# Patient Record
Sex: MALE | Race: WHITE | NOT HISPANIC OR LATINO | Employment: FULL TIME | URBAN - METROPOLITAN AREA
[De-identification: names, ages, dates, MRNs, and addresses within clinical notes are randomized per-mention and may not be internally consistent; named-entity substitution may affect disease eponyms.]

---

## 2020-12-14 ENCOUNTER — APPOINTMENT (EMERGENCY)
Dept: RADIOLOGY | Facility: HOSPITAL | Age: 40
End: 2020-12-14
Payer: OTHER MISCELLANEOUS

## 2020-12-14 ENCOUNTER — HOSPITAL ENCOUNTER (EMERGENCY)
Facility: HOSPITAL | Age: 40
Discharge: HOME | End: 2020-12-14
Attending: EMERGENCY MEDICINE
Payer: OTHER MISCELLANEOUS

## 2020-12-14 VITALS
RESPIRATION RATE: 18 BRPM | HEIGHT: 78 IN | SYSTOLIC BLOOD PRESSURE: 206 MMHG | OXYGEN SATURATION: 96 % | HEART RATE: 119 BPM | DIASTOLIC BLOOD PRESSURE: 108 MMHG | BODY MASS INDEX: 36.45 KG/M2 | WEIGHT: 315 LBS | TEMPERATURE: 97.2 F

## 2020-12-14 DIAGNOSIS — M25.512 ACUTE PAIN OF LEFT SHOULDER: Primary | ICD-10-CM

## 2020-12-14 PROCEDURE — 99283 EMERGENCY DEPT VISIT LOW MDM: CPT

## 2020-12-14 PROCEDURE — 73030 X-RAY EXAM OF SHOULDER: CPT

## 2020-12-14 PROCEDURE — 99282 EMERGENCY DEPT VISIT SF MDM: CPT | Performed by: EMERGENCY MEDICINE

## 2020-12-15 ENCOUNTER — TELEPHONE (OUTPATIENT)
Dept: OBGYN CLINIC | Facility: HOSPITAL | Age: 40
End: 2020-12-15

## 2020-12-23 ENCOUNTER — OFFICE VISIT (OUTPATIENT)
Dept: OBGYN CLINIC | Facility: CLINIC | Age: 40
End: 2020-12-23
Payer: OTHER MISCELLANEOUS

## 2020-12-23 VITALS
WEIGHT: 315 LBS | SYSTOLIC BLOOD PRESSURE: 160 MMHG | HEART RATE: 109 BPM | DIASTOLIC BLOOD PRESSURE: 109 MMHG | BODY MASS INDEX: 36.45 KG/M2 | HEIGHT: 78 IN

## 2020-12-23 DIAGNOSIS — S46.012A TRAUMATIC TEAR OF LEFT ROTATOR CUFF, UNSPECIFIED TEAR EXTENT, INITIAL ENCOUNTER: Primary | ICD-10-CM

## 2020-12-23 PROCEDURE — 20610 DRAIN/INJ JOINT/BURSA W/O US: CPT | Performed by: PHYSICIAN ASSISTANT

## 2020-12-23 PROCEDURE — 99203 OFFICE O/P NEW LOW 30 MIN: CPT | Performed by: PHYSICIAN ASSISTANT

## 2020-12-23 RX ORDER — AMLODIPINE BESYLATE 5 MG/1
TABLET ORAL
COMMUNITY
Start: 2020-11-28

## 2020-12-23 RX ORDER — BUPIVACAINE HYDROCHLORIDE 2.5 MG/ML
4 INJECTION, SOLUTION INFILTRATION; PERINEURAL
Status: COMPLETED | OUTPATIENT
Start: 2020-12-23 | End: 2020-12-23

## 2020-12-23 RX ORDER — METHYLPREDNISOLONE ACETATE 40 MG/ML
2 INJECTION, SUSPENSION INTRA-ARTICULAR; INTRALESIONAL; INTRAMUSCULAR; SOFT TISSUE
Status: COMPLETED | OUTPATIENT
Start: 2020-12-23 | End: 2020-12-23

## 2020-12-23 RX ORDER — LISINOPRIL 20 MG/1
20 TABLET ORAL DAILY
COMMUNITY
Start: 2020-12-04

## 2020-12-23 RX ADMIN — BUPIVACAINE HYDROCHLORIDE 4 ML: 2.5 INJECTION, SOLUTION INFILTRATION; PERINEURAL at 08:27

## 2020-12-23 RX ADMIN — METHYLPREDNISOLONE ACETATE 2 ML: 40 INJECTION, SUSPENSION INTRA-ARTICULAR; INTRALESIONAL; INTRAMUSCULAR; SOFT TISSUE at 08:27

## 2021-01-05 ENCOUNTER — OFFICE VISIT (OUTPATIENT)
Dept: OBGYN CLINIC | Facility: CLINIC | Age: 41
End: 2021-01-05
Payer: OTHER MISCELLANEOUS

## 2021-01-05 VITALS
WEIGHT: 315 LBS | DIASTOLIC BLOOD PRESSURE: 124 MMHG | HEIGHT: 78 IN | HEART RATE: 94 BPM | BODY MASS INDEX: 36.45 KG/M2 | SYSTOLIC BLOOD PRESSURE: 160 MMHG

## 2021-01-05 DIAGNOSIS — S46.012S ROTATOR CUFF STRAIN, LEFT, SEQUELA: ICD-10-CM

## 2021-01-05 DIAGNOSIS — M75.22 BICEPS TENDINITIS OF LEFT UPPER EXTREMITY: Primary | ICD-10-CM

## 2021-01-05 PROCEDURE — 20610 DRAIN/INJ JOINT/BURSA W/O US: CPT | Performed by: PHYSICIAN ASSISTANT

## 2021-01-05 PROCEDURE — 99213 OFFICE O/P EST LOW 20 MIN: CPT | Performed by: PHYSICIAN ASSISTANT

## 2021-01-05 RX ORDER — BUPIVACAINE HYDROCHLORIDE 2.5 MG/ML
4 INJECTION, SOLUTION INFILTRATION; PERINEURAL
Status: COMPLETED | OUTPATIENT
Start: 2021-01-05 | End: 2021-01-05

## 2021-01-05 RX ORDER — METHYLPREDNISOLONE ACETATE 40 MG/ML
2 INJECTION, SUSPENSION INTRA-ARTICULAR; INTRALESIONAL; INTRAMUSCULAR; SOFT TISSUE
Status: COMPLETED | OUTPATIENT
Start: 2021-01-05 | End: 2021-01-05

## 2021-01-05 RX ADMIN — BUPIVACAINE HYDROCHLORIDE 4 ML: 2.5 INJECTION, SOLUTION INFILTRATION; PERINEURAL at 09:59

## 2021-01-05 RX ADMIN — METHYLPREDNISOLONE ACETATE 2 ML: 40 INJECTION, SUSPENSION INTRA-ARTICULAR; INTRALESIONAL; INTRAMUSCULAR; SOFT TISSUE at 09:59
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
## 2021-01-05 NOTE — LETTER
January 5, 2021     Patient: Iliana Holbrook   YOB: 1980   Date of Visit: 1/5/2021       To Whom it May Concern:    Juliet Wood is under my professional care  He was seen in my office on 1/5/2021  He may return to work with limitations including desk or administrative duties only  If you have any questions or concerns, please don't hesitate to call  Sincerely,          Joie Higueraly, DO        CC: Bayron Berumen

## 2021-01-11 ENCOUNTER — EVALUATION (OUTPATIENT)
Dept: PHYSICAL THERAPY | Facility: CLINIC | Age: 41
End: 2021-01-11
Payer: OTHER MISCELLANEOUS

## 2021-01-11 DIAGNOSIS — M75.22 BICEPS TENDINITIS OF LEFT UPPER EXTREMITY: ICD-10-CM

## 2021-01-11 DIAGNOSIS — S46.012S ROTATOR CUFF STRAIN, LEFT, SEQUELA: ICD-10-CM

## 2021-01-11 PROCEDURE — 97162 PT EVAL MOD COMPLEX 30 MIN: CPT | Performed by: PHYSICAL THERAPIST

## 2021-01-11 PROCEDURE — 97110 THERAPEUTIC EXERCISES: CPT | Performed by: PHYSICAL THERAPIST
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
## 2021-01-13 ENCOUNTER — TELEPHONE (OUTPATIENT)
Dept: OBGYN CLINIC | Facility: HOSPITAL | Age: 41
End: 2021-01-13

## 2021-01-13 ENCOUNTER — APPOINTMENT (OUTPATIENT)
Dept: PHYSICAL THERAPY | Facility: CLINIC | Age: 41
End: 2021-01-13
Payer: OTHER MISCELLANEOUS

## 2021-01-14 DIAGNOSIS — Z20.822 CLOSE EXPOSURE TO COVID-19 VIRUS: ICD-10-CM

## 2021-01-14 DIAGNOSIS — R50.9 FEVER, UNSPECIFIED FEVER CAUSE: ICD-10-CM

## 2021-01-14 DIAGNOSIS — R05.9 COUGH: ICD-10-CM

## 2021-01-14 PROCEDURE — U0003 INFECTIOUS AGENT DETECTION BY NUCLEIC ACID (DNA OR RNA); SEVERE ACUTE RESPIRATORY SYNDROME CORONAVIRUS 2 (SARS-COV-2) (CORONAVIRUS DISEASE [COVID-19]), AMPLIFIED PROBE TECHNIQUE, MAKING USE OF HIGH THROUGHPUT TECHNOLOGIES AS DESCRIBED BY CMS-2020-01-R: HCPCS | Performed by: INTERNAL MEDICINE

## 2021-01-14 PROCEDURE — U0005 INFEC AGEN DETEC AMPLI PROBE: HCPCS | Performed by: INTERNAL MEDICINE

## 2021-01-16 LAB — SARS-COV-2 RNA SPEC QL NAA+PROBE: DETECTED

## 2021-01-18 ENCOUNTER — APPOINTMENT (OUTPATIENT)
Dept: PHYSICAL THERAPY | Facility: CLINIC | Age: 41
End: 2021-01-18
Payer: OTHER MISCELLANEOUS

## 2021-01-20 ENCOUNTER — APPOINTMENT (OUTPATIENT)
Dept: PHYSICAL THERAPY | Facility: CLINIC | Age: 41
End: 2021-01-20
Payer: OTHER MISCELLANEOUS

## 2021-01-25 ENCOUNTER — APPOINTMENT (OUTPATIENT)
Dept: PHYSICAL THERAPY | Facility: CLINIC | Age: 41
End: 2021-01-25
Payer: OTHER MISCELLANEOUS

## 2021-01-27 ENCOUNTER — OFFICE VISIT (OUTPATIENT)
Dept: PHYSICAL THERAPY | Facility: CLINIC | Age: 41
End: 2021-01-27
Payer: OTHER MISCELLANEOUS

## 2021-01-27 DIAGNOSIS — S46.012S ROTATOR CUFF STRAIN, LEFT, SEQUELA: ICD-10-CM

## 2021-01-27 DIAGNOSIS — M75.22 BICEPS TENDINITIS OF LEFT UPPER EXTREMITY: Primary | ICD-10-CM

## 2021-01-27 PROCEDURE — 97110 THERAPEUTIC EXERCISES: CPT

## 2021-01-27 PROCEDURE — 97140 MANUAL THERAPY 1/> REGIONS: CPT

## 2021-01-27 NOTE — PROGRESS NOTES
Daily Note     Today's date: 2021  Patient name: Hugh Givens  : 1980  MRN: [id number]  Referring provider: Claudette Epperson  Dx:   Encounter Diagnosis     ICD-10-CM    1  Biceps tendinitis of left upper extremity  M75 22    2  Rotator cuff strain, left, sequela  S46 012S        Start Time: 1000  Stop Time: 1105  Total time in clinic (min): 65 minutes    Subjective: "I am the same, the pain is same, I can't move my arm without pain "      Objective: See treatment diary below      Assessment: Tolerated treatment fair  Pt apprehensive with all A/PROM, muscle guarding throughout end range stretching  Reviewed posture and stretching for HEP  Decreased motion and strength in all planes  Will continue to progress as able  Patient demonstrated fatigue post treatment and would benefit from continued PT      Plan: Continue per plan of care  Progress treatment as tolerated         Precautions: high pain irritability     Re-eval Date: 2021    Date       Visit Count 1 2      FOTO See IE       Pain In See IE Rest= burning      Pain Out See IE        HEP: ER stretch, trap stretch, SCM stretch, pec stretch, scap squeeze, posture correction    Manuals        PROM, MFR, prn   L shoulder end range stretching, ant/post  Oscillation with glides  15'                              Neuro Re-Ed                                                                Ther Ex        UBE  Pulleys  Flex/scap  2' ea      Trap, SCM stretches 2x30" ea        pec door stretch 2x30"  4x30"      Scap Retract 20x 5"  20x/5"      Posture correction  20x 10"        Sleeper stretch         Prone Rows        ER/IR TB w/ lateral steps  Red  2x10/5"      Thoracic rot/ext        Elbow flex x3        AAROM w/ cane  Flex 2x10/5"       I,Y, Ts        Ther Activity                        Gait Training                        Modalities        CP 10' L shld  10' L shld skin intact pre/post tx      Prn

## 2021-02-01 ENCOUNTER — APPOINTMENT (OUTPATIENT)
Dept: PHYSICAL THERAPY | Facility: CLINIC | Age: 41
End: 2021-02-01
Payer: OTHER MISCELLANEOUS

## 2021-02-03 ENCOUNTER — APPOINTMENT (OUTPATIENT)
Dept: PHYSICAL THERAPY | Facility: CLINIC | Age: 41
End: 2021-02-03
Payer: OTHER MISCELLANEOUS

## 2021-02-05 ENCOUNTER — OFFICE VISIT (OUTPATIENT)
Dept: PHYSICAL THERAPY | Facility: CLINIC | Age: 41
End: 2021-02-05
Payer: OTHER MISCELLANEOUS

## 2021-02-05 DIAGNOSIS — M75.22 BICEPS TENDINITIS OF LEFT UPPER EXTREMITY: Primary | ICD-10-CM

## 2021-02-05 DIAGNOSIS — S46.012S ROTATOR CUFF STRAIN, LEFT, SEQUELA: ICD-10-CM

## 2021-02-05 PROCEDURE — 97110 THERAPEUTIC EXERCISES: CPT

## 2021-02-05 PROCEDURE — 97112 NEUROMUSCULAR REEDUCATION: CPT

## 2021-02-05 PROCEDURE — 97140 MANUAL THERAPY 1/> REGIONS: CPT

## 2021-02-05 NOTE — PROGRESS NOTES
Daily Note     Today's date: 2021  Patient name: Miguel Ángel Keys  : 1980  MRN: [id number]  Referring provider: Kate Bernardo  Dx:   Encounter Diagnosis     ICD-10-CM    1  Biceps tendinitis of left upper extremity  M75 22    2  Rotator cuff strain, left, sequela  S46 012S        Start Time: 0900  Stop Time: 1010  Total time in clinic (min): 70 minutes    Subjective: "I had a lot pain when I woke up this morning  I think I have more ROM but the pain is still there  I go back to the Dr 21 "      Objective: See treatment diary below      Assessment: Tolerated treatment well  Pt able to complete progression of exercises despite pain levels  Increased PROM with distraction; "catching" noted with returning to neutral position  Able to tolerate RTC strengthening without increase in symptoms  Pt RTD 21 and will schedule more appt after  Patient demonstrated fatigue post treatment and would benefit from continued PT      Plan: Continue per plan of care  Progress treatment as tolerated         Precautions: high pain irritability     Re-eval Date: 2021    Date      Visit Count 1 2 3     FOTO See IE       Pain In See IE Rest= burning 6-7     Pain Out See IE        HEP: ER stretch, trap stretch, SCM stretch, pec stretch, scap squeeze, posture correction    Manuals        PROM, MFR, prn   L shoulder end range stretching, ant/post  Oscillation with glides  15' L shoulder end range stretching with distraction, ant/post  Oscillation with glides  15'                             Neuro Re-Ed                                                                Ther Ex        UBE  Pulleys  Flex/scap  2' ea UBE  120 RPM 10'alt      Pulleys  Flex/scap  2' ea     Trap, SCM stretches 2x30" ea        pec door stretch 2x30"  4x30" 4x30"     Scap Retract 20x 5"  20x/5" MTP/LTP  grn 2x10/3-5"     Posture correction  20x 10"        Sleeper stretch         Prone Rows   2x10/3-5"     ER/IR TB w/ lateral steps  Red  2x10/5" Red  2x10/5"     Thoracic rot/ext   SL ER   2x10/3-5"     Elbow flex x3        AAROM w/ cane  Flex 2x10/5"  Flex 2x10/5"      I,Y, Ts   Single arm  2x10/3-5" ea     Ther Activity                        Gait Training                        Modalities        CP 10' L shld  10' L shld skin intact pre/post tx      Prn

## 2021-02-09 ENCOUNTER — OFFICE VISIT (OUTPATIENT)
Dept: OBGYN CLINIC | Facility: CLINIC | Age: 41
End: 2021-02-09
Payer: OTHER MISCELLANEOUS

## 2021-02-09 VITALS
HEIGHT: 78 IN | HEART RATE: 105 BPM | BODY MASS INDEX: 36.45 KG/M2 | WEIGHT: 315 LBS | DIASTOLIC BLOOD PRESSURE: 104 MMHG | SYSTOLIC BLOOD PRESSURE: 145 MMHG

## 2021-02-09 DIAGNOSIS — S43.432A TEAR OF LEFT GLENOID LABRUM, INITIAL ENCOUNTER: Primary | ICD-10-CM

## 2021-02-09 PROCEDURE — 99213 OFFICE O/P EST LOW 20 MIN: CPT | Performed by: ORTHOPAEDIC SURGERY

## 2021-02-09 NOTE — PROGRESS NOTES
ASSESSMENT/PLAN:    Diagnoses and all orders for this visit:    Tear of left glenoid labrum, initial encounter  -     MRI arthrogram left shoulder; Future  -     FL arthrogram shoulder left; Future          The patient is continuing to complain of both left shoulder pain and reduced range of motion  He has been participating in physical therapy and has not seen vast improvement  The MRI of his left shoulder was essentially negative  At this point, will order an MRI arthrogram of his left shoulder to rule out a tear of his labrum  He will follow up with our office once the MRI arthrogram is complete  The patient is acceptable to this plan  Return for  MRI arthrogram results  _____________________________________________________  CHIEF COMPLAINT:  Chief Complaint   Patient presents with    Left Shoulder - Follow-up         SUBJECTIVE:  Pauline Ceja is a 36 y o  male   With a history of a left shoulder injury  Last visit, the patient's biceps tendon sheath was injected with Depo-Medrol and Marcaine  He has also been participating in physical therapy  Today, he states that his range of motion has somewhat improved, however, it still limited  He is still complaining of left shoulder pain  He denies any new trauma or falls  He denies any numbness or tingling  He denies any fever or chills  The following portions of the patient's history were reviewed and updated as appropriate: allergies, current medications, past family history, past medical history, past social history, past surgical history and problem list     PAST MEDICAL HISTORY:  Past Medical History:   Diagnosis Date    Hypertension        PAST SURGICAL HISTORY:  History reviewed  No pertinent surgical history  FAMILY HISTORY:  History reviewed  No pertinent family history      SOCIAL HISTORY:  Social History     Tobacco Use    Smoking status: Never Smoker    Smokeless tobacco: Current User     Types: Chew   Substance Use Topics    Alcohol use: Never     Frequency: Never    Drug use: Never       MEDICATIONS:    Current Outpatient Medications:     amLODIPine (NORVASC) 5 mg tablet, take 1 tablet by mouth once daily AT BREAKFAST, Disp: , Rfl:     lisinopril (ZESTRIL) 20 mg tablet, Take 20 mg by mouth daily, Disp: , Rfl:     ALLERGIES:  No Known Allergies    ROS:  Review of Systems     Constitutional: Negative for fatigue, fever or loss of appetite  HENT: Negative  Respiratory: Negative for shortness of breath, dyspnea  Cardiovascular: Negative for chest pain/tightness  Gastrointestinal: Negative for abdominal pain, N/V  Endocrine: Negative for cold/heat intolerance, unexplained weight loss/gain  Genitourinary: Negative for flank pain, dysuria, hematuria  Musculoskeletal: Positive for arthralgia   Skin: Negative for rash  Neurological: Negative for numbness or tingling  Psychiatric/Behavioral: Negative for agitation  _____________________________________________________  PHYSICAL EXAMINATION:    Blood pressure (!) 145/104, pulse 105, height 6' 6" (1 981 m), weight (!) 143 kg (315 lb)  Constitutional: Oriented to person, place, and time  Appears well-developed and well-nourished  No distress  HENT:   Head: Normocephalic  Eyes: Conjunctivae are normal  Right eye exhibits no discharge  Left eye exhibits no discharge  No scleral icterus  Cardiovascular: Normal rate  Pulmonary/Chest: Effort normal    Neurological: Alert and oriented to person, place, and time  Skin: Skin is warm and dry  No rash noted  Not diaphoretic  No erythema  No pallor  Psychiatric: Normal mood and affect   Behavior is normal  Judgment and thought content normal       MUSCULOSKELETAL EXAMINATION:   Physical Exam  Ortho Exam      Left upper extremity is neurovascularly intact  Fingers are pink and mobile   Compartments are soft   Range of motion of shoulders from 0-90 degrees forward flexion abduction   Rotator cuff strength 4/5   Mild signs of impingement   Brisk cap refill   Sensation intact  Objective:  BP Readings from Last 1 Encounters:   02/09/21 (!) 145/104      Wt Readings from Last 1 Encounters:   02/09/21 (!) 143 kg (315 lb)        BMI:   Estimated body mass index is 36 4 kg/m² as calculated from the following:    Height as of this encounter: 6' 6" (1 981 m)  Weight as of this encounter: 143 kg (315 lb)            Carlos Gupta PA-C

## 2021-02-10 ENCOUNTER — TRANSCRIBE ORDERS (OUTPATIENT)
Dept: ADMINISTRATIVE | Facility: HOSPITAL | Age: 41
End: 2021-02-10

## 2021-02-10 DIAGNOSIS — S43.432A SUPERIOR GLENOID LABRUM LESION OF LEFT SHOULDER, INITIAL ENCOUNTER: Primary | ICD-10-CM

## 2021-02-16 ENCOUNTER — TELEPHONE (OUTPATIENT)
Dept: OBGYN CLINIC | Facility: CLINIC | Age: 41
End: 2021-02-16

## 2021-02-16 NOTE — TELEPHONE ENCOUNTER
Patient sees Dr Nikki Gil    Patient just caled back stating he has an MRI scheduled through [facility] on 2/17    Call back # [phone]

## 2021-02-16 NOTE — TELEPHONE ENCOUNTER
Called and LVM for Fabby Starkey and David Gallegos asking for Fabby Starkey to call [facility] at [phone] to schedule an arthrogram mri for his shoulder  We received a fax saying they have not been able to reach him

## 2021-02-17 ENCOUNTER — OFFICE VISIT (OUTPATIENT)
Dept: PHYSICAL THERAPY | Facility: CLINIC | Age: 41
End: 2021-02-17
Payer: OTHER MISCELLANEOUS

## 2021-02-17 DIAGNOSIS — M75.22 BICEPS TENDINITIS OF LEFT UPPER EXTREMITY: Primary | ICD-10-CM

## 2021-02-17 DIAGNOSIS — S46.012S ROTATOR CUFF STRAIN, LEFT, SEQUELA: ICD-10-CM

## 2021-02-17 PROCEDURE — 97140 MANUAL THERAPY 1/> REGIONS: CPT | Performed by: PHYSICAL MEDICINE & REHABILITATION

## 2021-02-17 PROCEDURE — 97110 THERAPEUTIC EXERCISES: CPT | Performed by: PHYSICAL MEDICINE & REHABILITATION

## 2021-02-17 NOTE — PROGRESS NOTES
PT Re-Evaluation     Today's date: 2021  Patient name: Deng Loza  : 1980  MRN: [id number]  Referring provider: Madie Loredo  Dx:   Encounter Diagnosis     ICD-10-CM    1  Biceps tendinitis of left upper extremity  M75 22    2  Rotator cuff strain, left, sequela  S46 012S                   Assessment  Assessment details: Deng Loza is a 36 y o  male presenting to outpatient physical therapy with diagnosis of biceps tendonitis and L shoulder pain  He has limited attendance with PT to date 2* COVID; attended 3 tx sessions prior to today  Thus, overall progress has been limited  Also continues with significant pain and limited A/PROM L shoulder with limited improvement since [facility]; thus progressions with PT have been poorly tolerated  Despite this, pt does report improvements in A/PROM since [facility] and notes his pain has not been as constant  He demonstrates mild improvements in L shoulder A/PROM  Overall he continues with high sx irritability with pain L GHJ  A/PROM L shoulder remains poor with empty end feel with pain  Cont'd to c/o "catching" in the joint with AROM L shoulder  Strength remains limited all planes L shoulder 2* pain  Cont'd functional limitations persist including limited tolerance for ADLs such as bathing/dressing, inability to lift with L UE, and inability to RTW duties  Patient to benefit from cont'd skilled outpatient physical therapy 2x/week for 4 weeks in order to reduce pain, maximize pain free range of motion, increase strength and stability, and improve functional activity in order to maximize return to prior level of function with reduced limitations   Will await results of upcoming MRI arthrogram  Thank you for your referral     Impairments: abnormal or restricted ROM, activity intolerance, impaired physical strength, lacks appropriate home exercise program, pain with function, poor posture  and poor body mechanics    Symptom irritability: highUnderstanding of Dx/Px/POC: good   Prognosis: fair    Goals  STGs to be achieved in 4 weeks:  1  Pt to demonstrate reduced subjective pain rating "at worst" by at least 2-3 points from Initial Eval in order to allow for reduced pain with ADLs and improved functional activity tolerance  - not met   2  Pt to demonstrate increased AROM of L shld by at least 5-10 degrees in order to allow for greater ease and independence with ADLs and functional mobility  - progressing   3  Pt to demonstrate full PROM of L Shld in order to maximize joint mobility and function and allow for progression of exercise program and achievement of goals  - not met   4  Pt to demonstrate increased MMT of L shld by at least 1/2-1 grade in order to improve safety and stability with ADLs and functional mobility  - not met     LTGs to be achieved in 6-8 weeks:  1  Pt will be I with HEP in order to continue to improve quality of life and independence and reduce risk for re-injury  - progressing   2  Pt to demonstrate return to work without limitations or restrictions  - not met   3  Pt to demonstrate improved function as noted by achieving or exceeding predicted score on FOTO outcomes assessment tool  - not met       Plan  Patient would benefit from: skilled physical therapy  Planned modality interventions: TENS  Other planned modality interventions: prn for symptom management   Planned therapy interventions: manual therapy, neuromuscular re-education, therapeutic activities, therapeutic exercise, home exercise program, patient education, self care, strengthening, stretching, joint mobilization and motor coordination training  Frequency: 2x week  Duration in visits: 8  Duration in weeks: 4  Plan of Care beginning date: 2/17/2021  Plan of Care expiration date: 3/19/2021  Treatment plan discussed with: patient and PTA        Subjective Evaluation    History of Present Illness  Mechanism of injury: Pt notes overall some improvements since [facility]   Notes improved A/PROM of his shoulder in PT/with HEP  Notes pain is no longer as constant/persistent, however notes cont'd sharp pains anterior L GHJ "inside the joint" when he reaches/moves it at times  Notes he often "forgets about it" and goes to reach with sudden pain in L GHJ  Notes cont'd "catching" sensation with ROM L shoulder  Notes cont'd stiffness in his shoulder, especially in the mornings  Denies any new sx/complaints  F/u with MD; referred for MRI arthrogram; to be done next Wed  No additional tx to date  RTD after MRI  Persistent functional limitations 2* cont'd L shoulder sx; difficulty bathing, dressing, reaching into cabinets, and lifting/shoveling  Is back to work "light duty"  Notes the last few days have been "pretty good", but overall continues to have good days/bad days  Pain  Current pain ratin  At best pain ratin (at rest )  At worst pain ratin  Location: L shoulder anteiror, axilla   Quality: sharp, throbbing, dull ache and tight  Relieving factors: ice, medications and rest  Aggravating factors: overhead activity, walking and lifting  Progression: improved (slightly )      Diagnostic Tests  MRI studies: normal (upcoming arthrogram )  Treatments  Previous treatment: injection treatment and medication  Current treatment: injection treatment and physical therapy  Patient Goals  Patient goals for therapy: decreased pain, increased motion, increased strength, independence with ADLs/IADLs, return to work and return to sport/leisure activities          Objective     Postural Observations  Seated posture: fair  Standing posture: fair    Additional Postural Observation Details  Forward head, tight SCM, Traps, pectoralis major/minor     Palpation   Left   Tenderness of the biceps, pectoralis major and pectoralis minor       Additional Palpation Details  Continues with ttp at anterior L shoulder; not as severe as at IE per pt       Tenderness     Left Shoulder   Tenderness in the biceps tendon (proximal), bicipital groove, coracoid process and subacromial bursa  No tenderness in the [facility] joint and SC joint  Cervical/Thoracic Screen   Cervical range of motion within normal limits    Neurological Testing     Sensation     Shoulder   Left Shoulder   Intact: light touch    Right Shoulder   Intact: light touch    Reflexes   Left   Biceps (C5/C6): trace (1+)  Brachioradialis (C6): trace (1+)  Triceps (C7): trace (1+)    Right   Deltoid (C5): trace (1+)  Biceps (C5/C6): trace (1+)  Brachioradialis (C6): trace (1+)  Triceps (C7): trace (1+)    Active Range of Motion   Left Shoulder   Flexion: 95 degrees with pain  Abduction: 85 degrees with pain  External rotation 90°: 45 degrees with pain  Internal rotation 90°: 40 degrees with pain    Right Shoulder   Flexion: 140 degrees   Abduction: 140 degrees   External rotation 90°: WFL  Internal rotation 90°: WFL    Additional Active Range of Motion Details  Pain and apprehension continues with L shoulder AROM  Notes a "ratcheting/catching" with lowering L GHJ from elevated position       Passive Range of Motion   Left Shoulder   Flexion: 110 degrees with pain  Abduction: 90 (scaption 120 ) degrees with pain  External rotation 90°: 50 degrees with pain  Internal rotation 90°: 45 degrees with pain    Additional Passive Range of Motion Details  Empty end feel with pain with all PROM L GHJ     Joint Play   Left Shoulder  Joints within functional limits are the anterior capsule, posterior capsule and inferior capsule  Hypomobile in the [facility] joint, cervical spine and thoracic spine      Strength/Myotome Testing     Left Shoulder     Planes of Motion   Flexion: 3+   Extension: 3+   Abduction: 3+   External rotation at 0°: 3+   Internal rotation at 0°: 3+     Isolated Muscles   Biceps: 4+   Triceps: 4     Right Shoulder     Planes of Motion   Flexion: 5   Extension: 5   Abduction: 5   External rotation at 90°: 5   Internal rotation at 90°: 5     Additional Strength Details  Pain and poor resistance tolerated with L shoulder break testing/MMT screen   Limited strength/effort noted with MMT all planes L shoulder with c/o pain       Tests     Left Shoulder   Positive empty can, external rotation lag sign, Hawkin's, lift-off, Neer's, painful arc and bicep load   Negative apprehension, belly press, drop arm, jerk, sulcus sign and relocation                Precautions: high pain irritability     Re-eval Date: 2/11/2021    Date 1/11 1/27 2/5 2/17    Visit Count 1 2 3 4    FOTO See IE       Pain In See IE Rest= burning 6-7 1-2/10    Pain Out See IE    3-4/10     HEP: ER stretch, trap stretch, SCM stretch, pec stretch, scap squeeze, posture correction    Manuals        PROM, MFR, prn   L shoulder end range stretching, ant/post  Oscillation with glides  15' L shoulder end range stretching with distraction, ant/post  Oscillation with glides  15' L shoulder end range stretching with distraction, ant/post  Oscillation with glides  15'                            Neuro Re-Ed                                                                Ther Ex        UBE  Pulleys  Flex/scap  2' ea UBE  120 RPM 10'alt      Pulleys  Flex/scap  2' ea UBE  120 RPM 10'alt      Pulleys  Flex/scap  2' ea    Trap, SCM stretches 2x30" ea        pec door stretch 2x30"  4x30" 4x30" 4x30"    Scap Retract 20x 5"  20x/5" MTP/LTP  grn 2x10/3-5" MTP/LTP  grn 2x10/3-5"    Posture correction  20x 10"        Sleeper stretch         Prone Rows   2x10/3-5" 2x10/3-5"    ER/IR TB w/ lateral steps  Red  2x10/5" Red  2x10/5" Red  2x10/5"    Thoracic rot/ext   SL ER   2x10/3-5" Resume     Elbow flex x3        AAROM w/ cane  Flex 2x10/5"  Flex 2x10/5"  Resume     I,Y, Ts   Single arm  2x10/3-5" ea Single arm  2x10/3-5" ea    Ther Activity                        Gait Training                        Modalities        CP 10' L shld  10' L shld skin intact pre/post tx  deferred    Prn

## 2021-02-22 ENCOUNTER — OFFICE VISIT (OUTPATIENT)
Dept: PHYSICAL THERAPY | Facility: CLINIC | Age: 41
End: 2021-02-22
Payer: OTHER MISCELLANEOUS

## 2021-02-22 DIAGNOSIS — M75.22 BICEPS TENDINITIS OF LEFT UPPER EXTREMITY: Primary | ICD-10-CM

## 2021-02-22 DIAGNOSIS — S46.012S ROTATOR CUFF STRAIN, LEFT, SEQUELA: ICD-10-CM

## 2021-02-22 PROCEDURE — 97140 MANUAL THERAPY 1/> REGIONS: CPT

## 2021-02-22 PROCEDURE — 97110 THERAPEUTIC EXERCISES: CPT

## 2021-02-22 NOTE — PROGRESS NOTES
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 "      Objective: See treatment diary below      Assessment: Tolerated treatment fair  Limited motion noted in all planes 2* pain in shoulder  Pt apprehensive to move shoulder, slow with all movements  Muscle guarding in all planes with end range pain  Pt scheduled for arthrogram of L shoulder 21  Will continue to monitor and progress as able  Patient demonstrated fatigue post treatment and would benefit from continued PT      Plan: Continue per plan of care  Progress treatment as tolerated         Precautions: high pain irritability     Re-eval Date: 2021    Date    Visit Count 1 2 3 4 5   FOTO See IE       Pain In See IE Rest= burning 6-7 1-2/10 4/10   Pain Out See IE    3-4/10  5/10   HEP: ER stretch, trap stretch, SCM stretch, pec stretch, scap squeeze, posture correction    Manuals        PROM, MFR, prn   L shoulder end range stretching, ant/post  Oscillation with glides  15' L shoulder end range stretching with distraction, ant/post  Oscillation with glides  15' L shoulder end range stretching with distraction, ant/post  Oscillation with glides  15' L shoulder end range stretching with distraction, ant/post  Oscillation with glides  15'                           Neuro Re-Ed                                                                Ther Ex        UBE  Pulleys  Flex/scap  2' ea UBE  120 RPM 10'alt      Pulleys  Flex/scap  2' ea UBE  120 RPM 10'alt      Pulleys  Flex/scap  2' ea UBE  120 RPM 10'alt      Pulleys  Flex/scap  2' ea   Trap, SCM stretches 2x30" ea        pec door stretch 2x30"  4x30" 4x30" 4x30"    Scap Retract 20x 5"  20x/5" MTP/LTP  grn 2x10/3-5" MTP/LTP  grn 2x10/3-5" MTP/LTP  grn 2x10/3-5"   Posture correction  20x 10"        Sleeper stretch         Prone Rows   2x10/3-5" 2x10/3-5" 3x10/3-5"   ER/IR TB w/ lateral steps  Red  2x10/5" Red  2x10/5" Red  2x10/5" Red  2x10/5"   Thoracic rot/ext   SL ER   2x10/3-5" Resume  SL ER   2x10/3-5"   Elbow flex x3        AAROM w/ cane  Flex 2x10/5"  Flex 2x10/5"  Resume  NV   I,Y, Ts   Single arm  2x10/3-5" ea Single arm  2x10/3-5" ea Single arm  2x10/3-5" ea   Ther Activity                        Gait Training                        Modalities        CP 10' L shld  10' L shld skin intact pre/post tx  deferred    Prn

## 2021-02-25 ENCOUNTER — OFFICE VISIT (OUTPATIENT)
Dept: PHYSICAL THERAPY | Facility: CLINIC | Age: 41
End: 2021-02-25
Payer: OTHER MISCELLANEOUS

## 2021-02-25 DIAGNOSIS — M75.22 BICEPS TENDINITIS OF LEFT UPPER EXTREMITY: Primary | ICD-10-CM

## 2021-02-25 DIAGNOSIS — S46.012S ROTATOR CUFF STRAIN, LEFT, SEQUELA: ICD-10-CM

## 2021-02-25 PROCEDURE — 97140 MANUAL THERAPY 1/> REGIONS: CPT

## 2021-02-25 PROCEDURE — 97110 THERAPEUTIC EXERCISES: CPT

## 2021-02-25 NOTE — PROGRESS NOTES
Daily Note     Today's date: 2021  Patient name: Margarita Messer  : 1980  MRN: [id number]  Referring provider: Bette Pierce  Dx:   Encounter Diagnosis     ICD-10-CM    1  Biceps tendinitis of left upper extremity  M75 22    2  Rotator cuff strain, left, sequela  S46 012S        Start Time: 0800  Stop Time: 0900  Total time in clinic (min): 60 minutes    Subjective: "My shoulder isn't that bad today  I had my MRI yesterday but script wasn't for an open MRI and I didn't fit in the closed  My shoulders were squeezed into the tube  I hope they were able to get good images '      Objective: See treatment diary below      Assessment: Tolerated treatment well  Tightness noted in all planes with end range pain  Able to progress with weight on various exercises without incident  Await MRI images and progress as able  Patient demonstrated fatigue post treatment and would benefit from continued PT      Plan: Continue per plan of care  Progress treatment as tolerated         Precautions: high pain irritability     Re-eval Date: 2021    Date        Visit Count 6       FOTO NV       Pain In 410       Pain Out 4/10       HEP: ER stretch, trap stretch, SCM stretch, pec stretch, scap squeeze, posture correction    Manuals        PROM, MFR, prn  L shoulder end range stretching with distraction, ant/post  Oscillation with glides  15'                               Neuro Re-Ed                                                                Ther Ex        UBE UBE  120 RPM 10'alt      Pulleys  Flex/scap  2' ea       Trap, SCM stretches 2x30" ea        pec door stretch        Scap Retract MTP/LTP  grn 2x10/3-5"       Posture correction  20x 10"        Sleeper stretch         Prone Rows 2# 3x10/3-5"       ER/IR TB w/ lateral steps Red  3x10/5"       Thoracic rot/ext SL ER   3x10/3-5"       Elbow flex x3        AAROM w/ cane        I,Y, Ts Single arm  Ext 2# 3x10  horiz 0# 3x10  scap 0# 3x10       Ther Activity                        Gait Training                        Modalities        CP deferred       Prn

## 2021-03-01 ENCOUNTER — OFFICE VISIT (OUTPATIENT)
Dept: PHYSICAL THERAPY | Facility: CLINIC | Age: 41
End: 2021-03-01
Payer: OTHER MISCELLANEOUS

## 2021-03-01 DIAGNOSIS — M75.22 BICEPS TENDINITIS OF LEFT UPPER EXTREMITY: Primary | ICD-10-CM

## 2021-03-01 DIAGNOSIS — S46.012S ROTATOR CUFF STRAIN, LEFT, SEQUELA: ICD-10-CM

## 2021-03-01 PROCEDURE — 97140 MANUAL THERAPY 1/> REGIONS: CPT

## 2021-03-01 PROCEDURE — 97110 THERAPEUTIC EXERCISES: CPT

## 2021-03-01 NOTE — PROGRESS NOTES
Daily Note     Today's date: 3/1/2021  Patient name: Margarita Messer  : 1980  MRN: [id number]  Referring provider: Bette Pierce  Dx:   Encounter Diagnosis     ICD-10-CM    1  Biceps tendinitis of left upper extremity  M75 22    2  Rotator cuff strain, left, sequela  S46 012S        Start Time: 0900  Stop Time: 1000  Total time in clinic (min): 60 minutes    Subjective: "I am feeling better on  but I don't much over the weekend  I am going back to the Dr tomorrow to get MRI results "      Objective: See treatment diary below      Assessment: Tolerated treatment well  Trial of added weight to horiz ABD, unable 2* increase pain  Able to add weight to SL ER, appropriate challenged noted, no significant increase in pain  Slow increase in PROM noted despite muscle guarding end range pain continues  Pt to see Dr and discuss MRI findings  Will continue to monitor and progress as able  Patient demonstrated fatigue post treatment and would benefit from continued PT      Plan: Continue per plan of care  Progress treatment as tolerated         Precautions: high pain irritability     Re-eval Date: 2021    Date 2/25 3/1      Visit Count 6 7      FOTO NV NV      Pain In 4/10 010      Pain Out 4/10 510      HEP: ER stretch, trap stretch, SCM stretch, pec stretch, scap squeeze, posture correction    Manuals        PROM, MFR, prn  L shoulder end range stretching with distraction, ant/post  Oscillation with glides  15' L shoulder end range stretching with distraction, ant/post  Oscillation with glides  15'                              Neuro Re-Ed                                                                Ther Ex        UBE UBE  120 RPM 10'alt      Pulleys  Flex/scap  2' ea UBE  120 RPM 10'alt      Pulleys  Flex/scap  2' ea      Trap, SCM stretches 2x30" ea        pec door stretch        Scap Retract MTP/LTP  grn 2x10/3-5" MTP/LTP  grn 2x10/3-5"      Posture correction  20x 10"        Sleeper stretch         Prone Rows 2# 3x10/3-5" 2# 3x10/3-5"      ER/IR TB w/ lateral steps Red  3x10/5" Red  3x10/5"      Thoracic rot/ext SL ER   3x10/3-5" SL ER   3x10/3-5"      Elbow flex x3        AAROM w/ cane        I,Y, Ts Single arm  Ext 2# 3x10  horiz 0# 3x10  scap 0# 3x10 Single arm  Ext 2# 3x10  horiz 0# 3x10  scap 0# 3x10      Ther Activity                        Gait Training                        Modalities        CP deferred       Prn

## 2021-03-02 ENCOUNTER — APPOINTMENT (OUTPATIENT)
Dept: RADIOLOGY | Facility: CLINIC | Age: 41
End: 2021-03-02
Payer: COMMERCIAL

## 2021-03-02 ENCOUNTER — OFFICE VISIT (OUTPATIENT)
Dept: OBGYN CLINIC | Facility: CLINIC | Age: 41
End: 2021-03-02
Payer: OTHER MISCELLANEOUS

## 2021-03-02 VITALS
BODY MASS INDEX: 36.45 KG/M2 | HEIGHT: 78 IN | WEIGHT: 315 LBS | DIASTOLIC BLOOD PRESSURE: 111 MMHG | HEART RATE: 101 BPM | SYSTOLIC BLOOD PRESSURE: 163 MMHG

## 2021-03-02 DIAGNOSIS — M25.512 LEFT SHOULDER PAIN, UNSPECIFIED CHRONICITY: Primary | ICD-10-CM

## 2021-03-02 DIAGNOSIS — M25.512 LEFT SHOULDER PAIN, UNSPECIFIED CHRONICITY: ICD-10-CM

## 2021-03-02 DIAGNOSIS — S46.912D STRAIN OF LEFT SHOULDER, SUBSEQUENT ENCOUNTER: ICD-10-CM

## 2021-03-02 PROCEDURE — 99213 OFFICE O/P EST LOW 20 MIN: CPT | Performed by: ORTHOPAEDIC SURGERY

## 2021-03-02 PROCEDURE — 73030 X-RAY EXAM OF SHOULDER: CPT

## 2021-03-02 NOTE — PROGRESS NOTES
Assessment/Plan:    No problem-specific Assessment & Plan notes found for this encounter  Diagnoses and all orders for this visit:    Left shoulder pain, unspecified chronicity  -     XR shoulder 2+ vw left; Future    Strain of left shoulder, subsequent encounter           the patient's symptoms are slowly improving  He still has pain  His MRI was essentially negative for anything acute as relates to work injury  Would recommend continuation of therapy and return back office 1 month  If there are any problems at that time, a diagnostic arthroscopy may be warranted  Subjective:      Patient ID: Ajith Soliz is a 36 y o  male  HPI      The patient has a history of a work-related left shoulder injury  He did receive an 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  he still has pain in his left shoulder  He denies any numbness or tingling  He denies any neck pain  The following portions of the patient's history were reviewed and updated as appropriate: allergies, current medications, past family history, past medical history, past social history, past surgical history and problem list     Review of Systems   Constitutional: Negative for chills, fever and unexpected weight change  HENT: Negative for hearing loss, nosebleeds and sore throat  Eyes: Negative for pain, redness and visual disturbance  Respiratory: Negative for cough, shortness of breath and wheezing  Cardiovascular: Negative for chest pain, palpitations and leg swelling  Gastrointestinal: Negative for abdominal pain, nausea and vomiting  Endocrine: Negative for polydipsia and polyuria  Genitourinary: Negative for dysuria and hematuria  Musculoskeletal: Positive for arthralgias and myalgias  Negative for back pain, gait problem, joint swelling, neck pain and neck stiffness  As noted in HPI   Skin: Negative for rash and wound     Neurological: Negative for dizziness, numbness and headaches  Psychiatric/Behavioral: Negative for decreased concentration and suicidal ideas  The patient is not nervous/anxious  Objective:      BP (!) 163/111 (BP Location: Right arm, Patient Position: Sitting, Cuff Size: Large)   Pulse 101   Ht 6' 6" (1 981 m)   Wt (!) 143 kg (315 lb)   BMI 36 40 kg/m²          Physical Exam        Neck was soft and supple  There is a negative axial compression test in her neck  Left upper extremity is neurovascular intact  Fingers are pink and mobile  Compartments are soft  Range of motion of his left shoulder was 115° of forward flexion abduction  Internal rotation just above the sacrum  There is   Mild signs of impingement  No instability  There is diffuse tenderness along the anterior    acromion    Negative speed's test      MRI arthrogram was negative for any acute findings     x-rays of the left shoulder shows no fractures or dislocations

## 2021-03-04 ENCOUNTER — OFFICE VISIT (OUTPATIENT)
Dept: PHYSICAL THERAPY | Facility: CLINIC | Age: 41
End: 2021-03-04
Payer: OTHER MISCELLANEOUS

## 2021-03-04 DIAGNOSIS — S46.012S ROTATOR CUFF STRAIN, LEFT, SEQUELA: ICD-10-CM

## 2021-03-04 DIAGNOSIS — M75.22 BICEPS TENDINITIS OF LEFT UPPER EXTREMITY: Primary | ICD-10-CM

## 2021-03-04 PROCEDURE — 97110 THERAPEUTIC EXERCISES: CPT

## 2021-03-04 PROCEDURE — 97140 MANUAL THERAPY 1/> REGIONS: CPT

## 2021-03-04 NOTE — PROGRESS NOTES
Daily Note     Today's date: 3/4/2021  Patient name: Jame Rdz  : 1980  MRN: [id number]  Referring provider: Sagar Morataya  Dx:   Encounter Diagnosis     ICD-10-CM    1  Biceps tendinitis of left upper extremity  M75 22    2  Rotator cuff strain, left, sequela  S46 012S        Start Time: 0900  Stop Time: 1000  Total time in clinic (min): 60 minutes    Subjective: "The Dr told me the MRI didn't show anything and  I have a lot of inflamed stuff in my shoulder  He wants me to continue with therapy for 4 weeks then maybe surgery to clean things out  Today isn't a bad day as far as pain "      Objective: See treatment diary below      Assessment: Tolerated treatment well  Pt able to add weight and/or increase weight on various exercises  End range tightness and pain continues >90* with flex and abd  Improved rotation noted  Will continue to monitor and progress as able  Patient demonstrated fatigue post treatment and would benefit from continued PT      Plan: Continue per plan of care  Progress treatment as tolerated         Precautions: high pain irritability     Re-eval Date: 2021    Date 2/25 3/1 3     Visit Count 6 7 8     FOTO NV NV 3/4     Pain In 4/10 0/10 0     Pain Out 4/10 5/10 2-3     HEP: ER stretch, trap stretch, SCM stretch, pec stretch, scap squeeze, posture correction    Manuals        PROM, MFR, prn  L shoulder end range stretching with distraction, ant/post  Oscillation with glides  15' L shoulder end range stretching with distraction, ant/post  Oscillation with glides  15' L shoulder end range stretching with distraction, ant/post  Oscillation with glides  15'                             Neuro Re-Ed                                                                Ther Ex        UBE UBE  120 RPM 10'alt      Pulleys  Flex/scap  2' ea UBE  120 RPM 10'alt      Pulleys  Flex/scap  2' ea UBE  120 RPM 10'alt      Pulleys  Flex/scap  2' ea     Trap, SCM stretches 2x30" ea pec door stretch   3x30"     Scap Retract MTP/LTP  grn 2x10/3-5" MTP/LTP  grn 2x10/3-5" MTP/LTP  grn 2x10/3-5"     Posture correction  20x 10"        Sleeper stretch         Prone Rows 2# 3x10/3-5" 2# 3x10/3-5" 2# 3x10/3-5"     ER/IR TB w/ lateral steps Red  3x10/5" Red  3x10/5" Red  3x10/5"     Thoracic rot/ext SL ER   3x10/3-5" SL ER   3x10/3-5" SL ER   1# 3x10/3-5"     Elbow flex x3        AAROM w/ cane        I,Y, Ts Single arm  Ext 2# 3x10  horiz 0# 3x10  scap 0# 3x10 Single arm  Ext 2# 3x10  horiz 0# 3x10  scap 0# 3x10 Single arm  Ext 2# 3x10  horiz 1# 2x10  scap 1# 2x10     Ther Activity                        Gait Training                        Modalities        CP deferred       Prn

## 2021-03-08 ENCOUNTER — OFFICE VISIT (OUTPATIENT)
Dept: PHYSICAL THERAPY | Facility: CLINIC | Age: 41
End: 2021-03-08
Payer: OTHER MISCELLANEOUS

## 2021-03-08 DIAGNOSIS — M75.22 BICEPS TENDINITIS OF LEFT UPPER EXTREMITY: Primary | ICD-10-CM

## 2021-03-08 DIAGNOSIS — S46.012S ROTATOR CUFF STRAIN, LEFT, SEQUELA: ICD-10-CM

## 2021-03-08 PROCEDURE — 97110 THERAPEUTIC EXERCISES: CPT

## 2021-03-08 PROCEDURE — 97140 MANUAL THERAPY 1/> REGIONS: CPT

## 2021-03-08 NOTE — PROGRESS NOTES
Daily Note     Today's date: 3/8/2021  Patient name: Clint Smith  : 1980  MRN: [id number]  Referring provider: Steffi Shea  Dx:   Encounter Diagnosis     ICD-10-CM    1  Biceps tendinitis of left upper extremity  M75 22    2  Rotator cuff strain, left, sequela  S46 012S        Start Time: 0800  Stop Time: 0900  Total time in clinic (min): 60 minutes    Subjective: "Today isn't a good morning  My shoulder is really bothering me when I move it  I didn't do anything over the weekend to make it worse "      Objective: See treatment diary below      Assessment: Tolerated treatment well  Pt noted increased pain all movement; above and below 90*; resolved with completion of exercises  Pt able to complete all exercises despite elevated pain  End range pain after 90* and muscle guarding noted; most significant with flex and scaption  Will continue to monitor and progress as able  Patient demonstrated fatigue post treatment and would benefit from continued PT      Plan: Continue per plan of care  Progress treatment as tolerated         Precautions: high pain irritability     Re-eval Date: 2021    Date 2/25 3/1 3/4 3/8    Visit Count 6 7 8 9    FOTO NV NV 3/4     Pain In 4/10 0/10 0 Mvt 7/10    Pain Out 4/10 5/10 2-3 Mvt 7/10    HEP: ER stretch, trap stretch, SCM stretch, pec stretch, scap squeeze, posture correction    Manuals        PROM, MFR, prn  L shoulder end range stretching with distraction, ant/post  Oscillation with glides  15' L shoulder end range stretching with distraction, ant/post  Oscillation with glides  15' L shoulder end range stretching with distraction, ant/post  Oscillation with glides  15' L shoulder end range stretching with distraction, ant/post  Oscillation with glides  15'                            Neuro Re-Ed                                                                Ther Ex        UBE UBE  120 RPM 10'alt      Pulleys  Flex/scap  2' ea UBE  120 RPM 10'alt      Pulleys  Flex/scap  2' ea UBE  120 RPM 10'alt      Pulleys  Flex/scap  2' ea UBE  120 RPM 10'alt      Pulleys  Flex/scap  2' ea    Trap, SCM stretches 2x30" ea        pec door stretch   3x30"     Scap Retract MTP/LTP  grn 2x10/3-5" MTP/LTP  grn 2x10/3-5" MTP/LTP  grn 2x10/3-5" MTP/LTP  grn 3x10/3-5"    Posture correction  20x 10"        Sleeper stretch     Serratus punch  2# 3x10    Prone Rows 2# 3x10/3-5" 2# 3x10/3-5" 2# 3x10/3-5" 2# 3x10/3-5"    ER/IR TB w/ lateral steps Red  3x10/5" Red  3x10/5" Red  3x10/5" grn  3x10/5"    Thoracic rot/ext SL ER   3x10/3-5" SL ER   3x10/3-5" SL ER   1# 3x10/3-5" SL ER   1# 3x10/3-5"    Elbow flex x3        AAROM w/ cane        I,Y, Ts Single arm  Ext 2# 3x10  horiz 0# 3x10  scap 0# 3x10 Single arm  Ext 2# 3x10  horiz 0# 3x10  scap 0# 3x10 Single arm  Ext 2# 3x10  horiz 1# 2x10  scap 1# 2x10 Single arm  Ext 2# 3x10  horiz 1# 3x10  scap 1# 3x10    Ther Activity                        Gait Training                        Modalities        CP deferred       Prn

## 2021-03-11 ENCOUNTER — OFFICE VISIT (OUTPATIENT)
Dept: PHYSICAL THERAPY | Facility: CLINIC | Age: 41
End: 2021-03-11
Payer: OTHER MISCELLANEOUS

## 2021-03-11 DIAGNOSIS — S46.012S ROTATOR CUFF STRAIN, LEFT, SEQUELA: ICD-10-CM

## 2021-03-11 DIAGNOSIS — M75.22 BICEPS TENDINITIS OF LEFT UPPER EXTREMITY: Primary | ICD-10-CM

## 2021-03-11 PROCEDURE — 97140 MANUAL THERAPY 1/> REGIONS: CPT

## 2021-03-11 PROCEDURE — 97110 THERAPEUTIC EXERCISES: CPT

## 2021-03-11 NOTE — PROGRESS NOTES
Daily Note     Today's date: 3/11/2021  Patient name: Raya Dumont  : 1980  MRN: [id number]  Referring provider: Isadora Carver  Dx:   Encounter Diagnosis     ICD-10-CM    1  Biceps tendinitis of left upper extremity  M75 22    2  Rotator cuff strain, left, sequela  S46 012S        Start Time: 0800  Stop Time: 0900  Total time in clinic (min): 60 minutes    Subjective: "My shoulder feels pretty good today, not a lot of pain "      Objective: See treatment diary below      Assessment: Tolerated treatment well  Pt able to perform with greater ease this date  Able to increase weight with SL ER  Strength gains noted overall since SOC  End range pain above 90* continues with tightness in all directions  Patient demonstrated fatigue post treatment and would benefit from continued PT      Plan: Continue per plan of care  Progress treatment as tolerated         Precautions: high pain irritability     Re-eval Date: 3/19/2021    Date 2/25 3/1 3/4 3/8 3/11   Visit Count 6 7 8 9 10   FOTO NV NV 3/4     Pain In 4/10 0/10 0 Mvt 7/10 2-3   Pain Out 4/10 5/10 2-3 Mvt 7/10 2-3   HEP: ER stretch, trap stretch, SCM stretch, pec stretch, scap squeeze, posture correction    Manuals        PROM, MFR, prn  L shoulder end range stretching with distraction, ant/post  Oscillation with glides  15' L shoulder end range stretching with distraction, ant/post  Oscillation with glides  15' L shoulder end range stretching with distraction, ant/post  Oscillation with glides  15' L shoulder end range stretching with distraction, ant/post  Oscillation with glides  15' L shoulder end range stretching with distraction, ant/post  Oscillation with glides  15'                           Neuro Re-Ed                                                                Ther Ex        UBE UBE  120 RPM 10'alt      Pulleys  Flex/scap  2' ea UBE  120 RPM 10'alt      Pulleys  Flex/scap  2' ea UBE  120 RPM 10'alt      Pulleys  Flex/scap  2' ea UBE  120 RPM 10'alt      Pulleys  Flex/scap  2' ea UBE  120 RPM 10'alt      Pulleys  Flex/scap  2' ea   Trap, SCM stretches 2x30" ea        pec door stretch   3x30"     Scap Retract MTP/LTP  grn 2x10/3-5" MTP/LTP  grn 2x10/3-5" MTP/LTP  grn 2x10/3-5" MTP/LTP  grn 3x10/3-5" MTP/LTP  grn 3x10/3-5"   Posture correction  20x 10"        Sleeper stretch     Serratus punch  2# 3x10 Serratus punch  2# 3x10   Prone Rows 2# 3x10/3-5" 2# 3x10/3-5" 2# 3x10/3-5" 2# 3x10/3-5" 2# 3x10/3-5"   ER/IR TB w/ lateral steps Red  3x10/5" Red  3x10/5" Red  3x10/5" grn  3x10/5" grn  3x10/5"   Thoracic rot/ext SL ER   3x10/3-5" SL ER   3x10/3-5" SL ER   1# 3x10/3-5" SL ER   1# 3x10/3-5" SL ER   2# 3x10/3-5"   Elbow flex x3        AAROM w/ cane        I,Y, Ts Single arm  Ext 2# 3x10  horiz 0# 3x10  scap 0# 3x10 Single arm  Ext 2# 3x10  horiz 0# 3x10  scap 0# 3x10 Single arm  Ext 2# 3x10  horiz 1# 2x10  scap 1# 2x10 Single arm  Ext 2# 3x10  horiz 1# 3x10  scap 1# 3x10 Single arm  Ext 2# 3x10  horiz 1# 3x10  scap 1# 3x10   Ther Activity                        Gait Training                        Modalities        CP deferred       Prn

## 2021-03-15 ENCOUNTER — OFFICE VISIT (OUTPATIENT)
Dept: PHYSICAL THERAPY | Facility: CLINIC | Age: 41
End: 2021-03-15
Payer: OTHER MISCELLANEOUS

## 2021-03-15 DIAGNOSIS — S46.012S ROTATOR CUFF STRAIN, LEFT, SEQUELA: ICD-10-CM

## 2021-03-15 DIAGNOSIS — M75.22 BICEPS TENDINITIS OF LEFT UPPER EXTREMITY: Primary | ICD-10-CM

## 2021-03-15 PROCEDURE — 97112 NEUROMUSCULAR REEDUCATION: CPT

## 2021-03-15 PROCEDURE — 97140 MANUAL THERAPY 1/> REGIONS: CPT

## 2021-03-15 PROCEDURE — 97110 THERAPEUTIC EXERCISES: CPT

## 2021-03-15 NOTE — PROGRESS NOTES
Daily Note     Today's date: 3/15/2021  Patient name: Lizett Bell  : 1980  MRN: [id number]  Referring provider: Justine Doll  Dx:   Encounter Diagnosis     ICD-10-CM    1  Biceps tendinitis of left upper extremity  M75 22    2  Rotator cuff strain, left, sequela  S46 012S        Start Time: 0800  Stop Time: 0900  Total time in clinic (min): 60 minutes    Subjective: "My shoulder is about the same  I am getting use to it feeling like it does  I'm able to carry grocery bags without problems, I think it's the pulling down on my shoulder that feels good "      Objective: See treatment diary below      Assessment: Tolerated treatment well  Pt able to increase weight on various exercises without incident  Pt noted 'clicking' with certain movement, pain noted, able to complete  Pt noted weight distraction feels best   PROM improving with minimal end range pain this date  Patient demonstrated fatigue post treatment and would benefit from continued PT      Plan: Continue per plan of care  Progress treatment as tolerated         Precautions: high pain irritability     Re-eval Date: 3/19/2021    Date 3/15       Visit Count 11       FOTO 3/4       Pain In 2/10       Pain Out 2/10       HEP: ER stretch, trap stretch, SCM stretch, pec stretch, scap squeeze, posture correction    Manuals        PROM, MFR, prn                                          L shoulder end range stretching with distraction, ant/post  Oscillation with glides  15'                               Neuro Re-Ed                                                                Ther Ex        UBE UBE  120 RPM 10'alt      Pulleys  Flex/scap  2' ea       Trap, SCM stretches 2x30" ea        pec door stretch 3x30"       Scap Retract MTP/LTP  grn 3x10/3-5"       Posture correction  20x 10"        Sleeper stretch  Serratus punch  3# 3x10       Prone Rows 3# 3x10/3-5"       ER/IR TB w/ lateral steps grn  3x10/5"       Thoracic rot/ext SL ER 2#3x10/3-5"       Elbow flex x3        AAROM w/ cane Push up plus  2x10/5"       I,Y, Ts Single arm  Ext 3# 3x10  horiz 2# 3x10  scap 2# 3x10       Ther Activity                        Gait Training                        Modalities        CP deferred       Prn

## 2021-03-18 ENCOUNTER — EVALUATION (OUTPATIENT)
Dept: PHYSICAL THERAPY | Facility: CLINIC | Age: 41
End: 2021-03-18
Payer: OTHER MISCELLANEOUS

## 2021-03-18 DIAGNOSIS — S46.012S ROTATOR CUFF STRAIN, LEFT, SEQUELA: ICD-10-CM

## 2021-03-18 DIAGNOSIS — M75.22 BICEPS TENDINITIS OF LEFT UPPER EXTREMITY: Primary | ICD-10-CM

## 2021-03-18 PROCEDURE — 97140 MANUAL THERAPY 1/> REGIONS: CPT | Performed by: PHYSICAL MEDICINE & REHABILITATION

## 2021-03-18 PROCEDURE — 97110 THERAPEUTIC EXERCISES: CPT | Performed by: PHYSICAL MEDICINE & REHABILITATION

## 2021-03-18 NOTE — PROGRESS NOTES
PT Re-Evaluation     Today's date: 3/18/2021  Patient name: Joy Del Real  : 1980  MRN: [id number]  Referring provider: Colleen Addison  Dx:   Encounter Diagnosis     ICD-10-CM    1  Biceps tendinitis of left upper extremity  M75 22    2  Rotator cuff strain, left, sequela  S46 012S                   Assessment  Assessment details: Joy Del Real is a 36 y o  male presenting to outpatient physical therapy with diagnosis of biceps tendonitis and L shoulder pain  He has attended 12 total PT sessions to date since [facility]  He most recently reports improvements in L shoulder ROM and strength  He also notes a mild reduction in L shoulder pain intensity and frequency vs last re-evaluation  Since last re-eval, he has demonstrated improvements in L shoulder A/PROM and L shoulder/UE strength all planes  He has tolerated a progressive PRE program with focus on RTC and SCT strength and dynamic SCT control with shoulder elevation  PROM is improving and is no longer painful as it was last re-eval  Overall, he continues with limited AROM L shoulder with pain and apprehension; Cont to c/o "catching" in the joint with AROM L shoulder  Strength remains limited all planes L shoulder 2* pain  Cont'd functional limitations persist including limited tolerance for ADLs such as bathing/dressing, inability to lift with L UE, and inability to RTW duties  Patient to benefit from cont'd skilled outpatient physical therapy 2x/week for 4 weeks in order to reduce pain, maximize pain free range of motion, increase strength and stability, and improve functional activity in order to maximize return to prior level of function with reduced limitations  F/u with MD in a few weeks   Thank you for your referral     Impairments: abnormal or restricted ROM, activity intolerance, impaired physical strength, lacks appropriate home exercise program, pain with function, poor posture  and poor body mechanics    Symptom irritability: highUnderstanding of Dx/Px/POC: good   Prognosis: fair    Goals  STGs to be achieved in 4 weeks:  1  Pt to demonstrate reduced subjective pain rating "at worst" by at least 2-3 points from Initial Eval in order to allow for reduced pain with ADLs and improved functional activity tolerance  -progressing   2  Pt to demonstrate increased AROM of L shld by at least 5-10 degrees in order to allow for greater ease and independence with ADLs and functional mobility  - met, Improve active shoulder elevation to at least 115* without pain by next RE   3  Pt to demonstrate full PROM of L Shld in order to maximize joint mobility and function and allow for progression of exercise program and achievement of goals  - progressing   4  Pt to demonstrate increased MMT of L shld by at least 1/2-1 grade in order to improve safety and stability with ADLs and functional mobility  - met, Improve L shoulder abduction and ER strength to at least 4-4+/5 by next RE      LTGs to be achieved in 6-8 weeks:  1  Pt will be I with HEP in order to continue to improve quality of life and independence and reduce risk for re-injury  - progressing   2  Pt to demonstrate return to work without limitations or restrictions  - progressing   3  Pt to demonstrate improved function as noted by achieving or exceeding predicted score on FOTO outcomes assessment tool   - progressing       Plan  Patient would benefit from: skilled physical therapy  Planned modality interventions: TENS  Other planned modality interventions: prn for symptom management   Planned therapy interventions: manual therapy, neuromuscular re-education, therapeutic activities, therapeutic exercise, home exercise program, patient education, self care, strengthening, stretching, joint mobilization and motor coordination training  Frequency: 2x week  Duration in visits: 8  Duration in weeks: 4  Plan of Care beginning date: 3/18/2021  Plan of Care expiration date: 4/17/2021  Treatment plan discussed with: patient and PTA        Subjective Evaluation    History of Present Illness  Mechanism of injury: Pt repots overall some improvement in his shoulder in the past month  Notes improvements in ROM and strength of his L shoulder  Notes pain is not as intense/not as often as last re-eval  Notes his "smaller motions" are not as intensely painful  Notes his shoulder still "catches" and is painful  Notes if he moves too quick, the pain is still intense  Notes cont'd limitations with reaching overhead or away from his body  Cont'd limitations with lifting away from his body with L shoulder/UE  Notes cont'd difficulty donning a shirt and washing his hair 2* pain/sx  Continues to wake up at night a lot 2* pain/sx L shoulder  RTD 4/5  Notes no additional tx to date  No new sx  Possible "exploratory surgery" if sx don't change/resolve  Quality of life: good    Pain  Current pain ratin  At best pain ratin (at rest )  At worst pain ratin (with quick movements)  Location: L shoulder anteiror, axilla   Quality: sharp, throbbing, dull ache and tight  Relieving factors: ice, medications and rest  Aggravating factors: overhead activity, walking and lifting  Progression: improved      Diagnostic Tests  MRI studies: normal (upcoming arthrogram )  Treatments  Previous treatment: injection treatment and medication  Current treatment: injection treatment and physical therapy  Patient Goals  Patient goals for therapy: decreased pain, increased motion, increased strength, independence with ADLs/IADLs, return to work and return to sport/leisure activities          Objective     Postural Observations  Seated posture: fair  Standing posture: fair    Additional Postural Observation Details  Forward head, tight SCM, Traps, pectoralis major/minor     Palpation   Left   Tenderness of the biceps, pectoralis major and pectoralis minor       Additional Palpation Details  Continues with ttp at anterior L shoulder; not as severe as at IE per pt - continues to improve       Tenderness     Left Shoulder   Tenderness in the biceps tendon (proximal), bicipital groove, coracoid process and subacromial bursa  No tenderness in the [facility] joint and SC joint  Additional Tenderness Details  Reduced sensitivity to palpation vs prior evaluations     Cervical/Thoracic Screen   Cervical range of motion within normal limits    Neurological Testing     Sensation     Shoulder   Left Shoulder   Intact: light touch    Right Shoulder   Intact: light touch    Reflexes   Left   Biceps (C5/C6): trace (1+)  Brachioradialis (C6): trace (1+)  Triceps (C7): trace (1+)    Right   Deltoid (C5): trace (1+)  Biceps (C5/C6): trace (1+)  Brachioradialis (C6): trace (1+)  Triceps (C7): trace (1+)    Active Range of Motion   Left Shoulder   Flexion: 100 degrees with pain  Abduction: 105 degrees with pain  External rotation BTH: T2 with pain  Internal rotation BTB: T11 with pain    Right Shoulder   Flexion: 140 degrees   Abduction: 140 degrees   External rotation 90°: WFL  Internal rotation 90°: WFL    Additional Active Range of Motion Details  Pain and apprehension continues with L shoulder AROM  Notes a "ratcheting/catching" with lowering L GHJ from elevated position       Passive Range of Motion   Left Shoulder   Flexion: 140 degrees   Abduction: 165 degrees   External rotation 90°: 75 degrees   Internal rotation 90°: 60 degrees     Additional Passive Range of Motion Details  Stiff end feels with creep all planes  Denies pain at end ranges  Pain with lowering from flexion/abduction with pain in mid range with c/o "ratcheting" in the joint       Joint Play   Left Shoulder  Joints within functional limits are the anterior capsule, posterior capsule and inferior capsule       Strength/Myotome Testing     Left Shoulder     Planes of Motion   Flexion: 4-   Extension: 4-   Abduction: 4-   External rotation at 0°: 4-   Internal rotation at 0°: 4-     Isolated Muscles   Biceps: 4+   Triceps: 4+ Right Shoulder     Planes of Motion   Flexion: 5   Extension: 5   Abduction: 5   External rotation at 90°: 5   Internal rotation at 90°: 5     Additional Strength Details  Pain and poor resistance tolerated with L shoulder break testing/MMT screen - improving, continues with pain however improved strength noted vs last re-eval       Tests     Left Shoulder   Positive empty can, external rotation lag sign, Hawkin's, lift-off, Neer's, painful arc and bicep load   Negative apprehension, belly press, drop arm, jerk, sulcus sign and relocation                Precautions: high pain irritability     Re-eval Date: 4/17/2021      Date 3/15 3/18      Visit Count 11 12      FOTO 3/4       Pain In 2/10 2/10      Pain Out 2/10 2/10      HEP: ER stretch, trap stretch, SCM stretch, pec stretch, scap squeeze, posture correction    Manuals  3/18      PROM, MFR, prn                                          L shoulder end range stretching with distraction, ant/post  Oscillation with glides  15' L shoulder end range stretching with distraction, ant/post  Oscillation with glides  15'                              Neuro Re-Ed                                                                Ther Ex        UBE UBE  120 RPM 10'alt      Pulleys  Flex/scap  2' ea UBE  120 RPM 10'alt      Pulleys  Flex/scap  2' ea      Trap, SCM stretches 2x30" ea  HEP      pec door stretch 3x30" 4x30"      Scap Retract MTP/LTP  grn 3x10/3-5" MTP/LTP  grn 3x10/3-5"      Posture correction  20x 10"        Sleeper stretch  Serratus punch  3# 3x10 Serratus punch  3# 3x10      Prone Rows 3# 3x10/3-5" 3# 3x10/3-5"      ER/IR TB w/ lateral steps grn  3x10/5" grn  3x10/5"      Thoracic rot/ext SL ER   2#3x10/3-5" SL ER   2#3x10/3-5"      Elbow flex x3        AAROM w/ cane Push up plus  2x10/5" Resume       I,Y, Ts Single arm  Ext 3# 3x10  horiz 2# 3x10  scap 2# 3x10 Single arm  Ext 3# 3x10  horiz 2# 3x10  scap 2# 3x10      Ther Activity                        Gait Training                        Modalities        CP deferred deferred      Prn

## 2021-03-22 ENCOUNTER — OFFICE VISIT (OUTPATIENT)
Dept: PHYSICAL THERAPY | Facility: CLINIC | Age: 41
End: 2021-03-22
Payer: OTHER MISCELLANEOUS

## 2021-03-22 DIAGNOSIS — S46.012S ROTATOR CUFF STRAIN, LEFT, SEQUELA: ICD-10-CM

## 2021-03-22 DIAGNOSIS — M75.22 BICEPS TENDINITIS OF LEFT UPPER EXTREMITY: Primary | ICD-10-CM

## 2021-03-22 PROCEDURE — 97140 MANUAL THERAPY 1/> REGIONS: CPT

## 2021-03-22 PROCEDURE — 97110 THERAPEUTIC EXERCISES: CPT
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
## 2021-03-25 ENCOUNTER — OFFICE VISIT (OUTPATIENT)
Dept: PHYSICAL THERAPY | Facility: CLINIC | Age: 41
End: 2021-03-25
Payer: OTHER MISCELLANEOUS

## 2021-03-25 DIAGNOSIS — S46.012S ROTATOR CUFF STRAIN, LEFT, SEQUELA: ICD-10-CM

## 2021-03-25 DIAGNOSIS — M75.22 BICEPS TENDINITIS OF LEFT UPPER EXTREMITY: Primary | ICD-10-CM

## 2021-03-25 PROCEDURE — 97140 MANUAL THERAPY 1/> REGIONS: CPT

## 2021-03-25 PROCEDURE — 97112 NEUROMUSCULAR REEDUCATION: CPT

## 2021-03-25 PROCEDURE — 97110 THERAPEUTIC EXERCISES: CPT

## 2021-03-25 NOTE — PROGRESS NOTES
Daily Note     Today's date: 3/25/2021  Patient name: Addison Finney  : 1980  MRN: [id number]  Referring provider: Quita Minor  Dx:   Encounter Diagnosis     ICD-10-CM    1  Biceps tendinitis of left upper extremity  M75 22    2  Rotator cuff strain, left, sequela  S46 012S        Start Time: 0800  Stop Time: 0900  Total time in clinic (min): 60 minutes    Subjective: "My shoulder is feeling a little better, it's not as sore this morning "      Objective: See treatment diary below      Assessment: Tolerated treatment well  Pt able to increase weight on various prone arm raises and resistance with tband without incident  Good PROM noted in all planes with minimal end range pain, pt noted occasional "cracking" over 90*, no increased pain  Will continue to progress with strengthening to return to PLOF  Patient demonstrated fatigue post treatment and would benefit from continued PT      Plan: Continue per plan of care  Progress treatment as tolerated         Precautions: high pain irritability     Re-eval Date: 2021      Date 3/15 3/18 3/22 3/25    Visit Count 11 12 13 14    FOTO 3/4       Pain In 2/10 2/10 7/10 2-3/10    Pain Out 2/10 2/10 7/10 1-2    HEP: ER stretch, trap stretch, SCM stretch, pec stretch, scap squeeze, posture correction    Manuals  3/18 3/22 3/25    PROM, MFR, prn                                          L shoulder end range stretching with distraction, ant/post  Oscillation with glides  15' L shoulder end range stretching with distraction, ant/post  Oscillation with glides  15' L shoulder end range stretching with distraction, ant/post  Oscillation with glides  15' L shoulder end range stretching with distraction, ant/post  Oscillation with glides  15'                            Neuro Re-Ed                                                                Ther Ex        UBE UBE  120 RPM 10'alt      Pulleys  Flex/scap  2' ea UBE  120 RPM 10'alt      Pulleys  Flex/scap  2' ea UBE  120 RPM 10'alt      Pulleys  Flex/scap  2' ea UBE  120 RPM 10'alt      Pulleys  Flex/scap  2' ea    Trap, SCM stretches 2x30" ea  HEP      pec door stretch 3x30" 4x30" 4x30" 4x30"    Scap Retract MTP/LTP  grn 3x10/3-5" MTP/LTP  grn 3x10/3-5" MTP/LTP  grn 3x10/3-5" MTP/LTP  blue 2x10/3-5"    Posture correction  20x 10"        Sleeper stretch  Serratus punch  3# 3x10 Serratus punch  3# 3x10 Serratus punch  3# 3x10 Serratus punch  3# 3x10    Prone Rows 3# 3x10/3-5" 3# 3x10/3-5" 3# 3x10/3-5" 3# 3x10/3-5"    ER/IR TB w/ lateral steps grn  3x10/5" grn  3x10/5" grn  3x10/5" blue  3x10/5"    Thoracic rot/ext SL ER   2#3x10/3-5" SL ER   2#3x10/3-5" SL ER   2#3x10/3-5" SL ER   2#3x10/3-5"    Elbow flex x3        AAROM w/ cane Push up plus  2x10/5" Resume   Push up plus  2x10/5"    I,Y, Ts Single arm  Ext 3# 3x10  horiz 2# 3x10  scap 2# 3x10 Single arm  Ext 3# 3x10  horiz 2# 3x10  scap 2# 3x10 Single arm  Ext 3# 3x10  horiz 2# 3x10  scap 2# 3x10 Single arm  Ext 3# 3x10  horiz 2# 3x10  scap 3# 3x10    Ther Activity                        Gait Training                        Modalities        CP deferred deferred      Prn

## 2021-03-29 ENCOUNTER — APPOINTMENT (OUTPATIENT)
Dept: PHYSICAL THERAPY | Facility: CLINIC | Age: 41
End: 2021-03-29
Payer: OTHER MISCELLANEOUS

## 2021-03-31 ENCOUNTER — OFFICE VISIT (OUTPATIENT)
Dept: PHYSICAL THERAPY | Facility: CLINIC | Age: 41
End: 2021-03-31
Payer: OTHER MISCELLANEOUS

## 2021-03-31 DIAGNOSIS — S46.012S ROTATOR CUFF STRAIN, LEFT, SEQUELA: ICD-10-CM

## 2021-03-31 DIAGNOSIS — M75.22 BICEPS TENDINITIS OF LEFT UPPER EXTREMITY: Primary | ICD-10-CM

## 2021-03-31 PROCEDURE — 97110 THERAPEUTIC EXERCISES: CPT

## 2021-03-31 PROCEDURE — 97140 MANUAL THERAPY 1/> REGIONS: CPT

## 2021-03-31 NOTE — PROGRESS NOTES
Daily Note     Today's date: 3/31/2021  Patient name: Sherlyn Tang  : 1980  MRN: [id number]  Referring provider: Ofelia Chakraborty  Dx:   Encounter Diagnosis     ICD-10-CM    1  Biceps tendinitis of left upper extremity  M75 22    2  Rotator cuff strain, left, sequela  S46 012S        Start Time: 0700  Stop Time: 0755  Total time in clinic (min): 55 minutes    Subjective: "My shoulder is feeling a little better  I still get a pain with lowering my arm "       Objective: See treatment diary below      Assessment: Tolerated treatment well  Pt able to increase weight on various exercises without incident  Improved A/PROM noted in all planes, discomfort noted with returning to neutral position around 120*  Will continue to monitor and progress as able  Patient demonstrated fatigue post treatment and would benefit from continued PT      Plan: Continue per plan of care  Progress treatment as tolerated         Precautions: high pain irritability     Re-eval Date: 2021      Date 3/15 3/18 3/22 3/25 3/31   Visit Count 11 12 13 14 15   FOTO 3/4       Pain In 2/10 2/10 7/10 2-3/10    Pain Out 2/10 2/10 7/10 1-2    HEP: ER stretch, trap stretch, SCM stretch, pec stretch, scap squeeze, posture correction    Manuals  3/18 3/22 3/25    PROM, MFR, prn                                          L shoulder end range stretching with distraction, ant/post  Oscillation with glides  15' L shoulder end range stretching with distraction, ant/post  Oscillation with glides  15' L shoulder end range stretching with distraction, ant/post  Oscillation with glides  15' L shoulder end range stretching with distraction, ant/post  Oscillation with glides  15'                            Neuro Re-Ed                                                                Ther Ex        UBE UBE  120 RPM 10'alt      Pulleys  Flex/scap  2' ea UBE  120 RPM 10'alt      Pulleys  Flex/scap  2' ea UBE  120 RPM 10'alt      Pulleys  Flex/scap  2' ea UBE  120 RPM 10'alt      Pulleys  Flex/scap  2' ea UBE  120 RPM 10'alt      Pulleys  Flex/scap  2' ea   Trap, SCM stretches 2x30" ea  HEP      pec door stretch 3x30" 4x30" 4x30" 4x30"    Scap Retract MTP/LTP  grn 3x10/3-5" MTP/LTP  grn 3x10/3-5" MTP/LTP  grn 3x10/3-5" MTP/LTP  blue 2x10/3-5" MTP/LTP  blue 3x10/3-5"   Posture correction  20x 10"        Sleeper stretch  Serratus punch  3# 3x10 Serratus punch  3# 3x10 Serratus punch  3# 3x10 Serratus punch  3# 3x10 Serratus punch  4# 3x10   Prone Rows 3# 3x10/3-5" 3# 3x10/3-5" 3# 3x10/3-5" 3# 3x10/3-5" 3# 3x10/3-5"   ER/IR TB w/ lateral steps grn  3x10/5" grn  3x10/5" grn  3x10/5" blue  3x10/5" blue  3x10/5"   Thoracic rot/ext SL ER   2#3x10/3-5" SL ER   2#3x10/3-5" SL ER   2#3x10/3-5" SL ER   2#3x10/3-5" SL ER   3#3x10/3-5"   Elbow flex x3        AAROM w/ cane Push up plus  2x10/5" Resume   Push up plus  2x10/5"    I,Y, Ts Single arm  Ext 3# 3x10  horiz 2# 3x10  scap 2# 3x10 Single arm  Ext 3# 3x10  horiz 2# 3x10  scap 2# 3x10 Single arm  Ext 3# 3x10  horiz 2# 3x10  scap 2# 3x10 Single arm  Ext 3# 3x10  horiz 2# 3x10  scap 3# 3x10 Single arm  Ext 3# 3x10  horiz 2# 3x10  scap 3# 3x10   Ther Activity                        Gait Training                        Modalities        CP deferred deferred      Prn

## 2021-04-01 ENCOUNTER — OFFICE VISIT (OUTPATIENT)
Dept: PHYSICAL THERAPY | Facility: CLINIC | Age: 41
End: 2021-04-01
Payer: OTHER MISCELLANEOUS

## 2021-04-01 DIAGNOSIS — S46.012S ROTATOR CUFF STRAIN, LEFT, SEQUELA: ICD-10-CM

## 2021-04-01 DIAGNOSIS — M75.22 BICEPS TENDINITIS OF LEFT UPPER EXTREMITY: Primary | ICD-10-CM

## 2021-04-01 PROCEDURE — 97110 THERAPEUTIC EXERCISES: CPT

## 2021-04-01 PROCEDURE — 97140 MANUAL THERAPY 1/> REGIONS: CPT

## 2021-04-01 NOTE — PROGRESS NOTES
Daily Note     Today's date: 2021  Patient name: Frannie Samson  : 1980  MRN: [id number]  Referring provider: Lopez Yoon  Dx:   Encounter Diagnosis     ICD-10-CM    1  Biceps tendinitis of left upper extremity  M75 22    2  Rotator cuff strain, left, sequela  S46 012S        Start Time: 0700  Stop Time: 0800  Total time in clinic (min): 60 minutes    Subjective: "My shoulder is sore today "      Objective: See treatment diary below      Assessment: Tolerated treatment well  Pt continues with motion and strength gains since [facility]  Decreased overall pain, with occasional "caughting" with arm outreached pulling back into body  Pt to RTD  and will await recommendations  Patient demonstrated fatigue post treatment and would benefit from continued PT      Plan: Continue per plan of care  Progress treatment as tolerated         Precautions: high pain irritability     Re-eval Date: 2021      Date        Visit Count 16       FOTO        Pain In 2/10       Pain Out 2/10       HEP: ER stretch, trap stretch, SCM stretch, pec stretch, scap squeeze, posture correction    Manuals        PROM, MFR, prn                                          L shoulder end range stretching with distraction, ant/post  Oscillation with glides  15'                               Neuro Re-Ed                                                                Ther Ex        UBE UBE  80 RPM 10'alt      Pulleys  Flex/scap  2' ea       Trap, SCM stretches        pec door stretch 3x30"       Scap Retract MTP/LTP  blue 3x10/3-5"       Posture correction         Sleeper stretch  Serratus punch  34 3x10       Prone Rows 3# 3x10/3-5"       ER/IR TB w/ lateral steps blue  3x10/5"       Thoracic rot/ext SL ER   3#3x10/3-5"       Elbow flex x3        AAROM w/ cane Push up plus  2x10/5"       I,Y, Ts Single arm Ext 3# 3x10  horiz 2#3x10  scap 3#3x10       Ther Activity                        Gait Training Modalities        CP deferred       Prn

## 2021-04-06 ENCOUNTER — OFFICE VISIT (OUTPATIENT)
Dept: OBGYN CLINIC | Facility: CLINIC | Age: 41
End: 2021-04-06
Payer: OTHER MISCELLANEOUS

## 2021-04-06 VITALS — WEIGHT: 315 LBS | HEIGHT: 78 IN | BODY MASS INDEX: 36.45 KG/M2

## 2021-04-06 DIAGNOSIS — Z01.812 PRE-OPERATIVE LABORATORY EXAMINATION: ICD-10-CM

## 2021-04-06 DIAGNOSIS — M24.812 INTERNAL DERANGEMENT OF SHOULDER, LEFT: Primary | ICD-10-CM

## 2021-04-06 PROCEDURE — 99213 OFFICE O/P EST LOW 20 MIN: CPT | Performed by: ORTHOPAEDIC SURGERY

## 2021-04-06 RX ORDER — CHLORHEXIDINE GLUCONATE 4 G/100ML
SOLUTION TOPICAL DAILY PRN
Status: CANCELLED | OUTPATIENT
Start: 2021-05-12

## 2021-04-06 RX ORDER — CEFAZOLIN SODIUM 2 G/50ML
2000 SOLUTION INTRAVENOUS ONCE
Status: CANCELLED | OUTPATIENT
Start: 2021-05-12 | End: 2021-04-06

## 2021-04-06 RX ORDER — CHLORHEXIDINE GLUCONATE 0.12 MG/ML
15 RINSE ORAL ONCE
Status: CANCELLED | OUTPATIENT
Start: 2021-05-12 | End: 2021-04-06

## 2021-04-06 NOTE — PROGRESS NOTES
Assessment/Plan:    No problem-specific Assessment & Plan notes found for this encounter  Diagnoses and all orders for this visit:    Internal derangement of shoulder, left              At this point, the patient has exhausted all conservative treatment  The next logical step would be to perform a diagnostic arthroscopy of his left shoulder with possible repair if warranted  All risks, complications, benefits were discussed with the patient in great detail including bleeding, infection, blood clots, pain, stiffness, neurovascular damage, fractures, dislocations, the possibility of loss of life limb for surgery, heart attack, stroke, etcetera I did explain to the patient that if no pathology is present, he may have to live with the way the shoulder is and is willing to take these risks  His surgery is tentatively scheduled for Wednesday May 12, 2021    Subjective:      Patient ID: Godfrey Gan is a 36 y o  male  HPI     the patient has a history of a left shoulder injury from mid December  He did have multiple diagnostic studies which were negative  He states he still having pain  He states his shoulder makes a "catching" sign present  Despite activity modification, nonsteroidal medications, multiple diagnostic studies, and rehabilitation, the pain affects his lifestyle  He wants to proceed with elective arthroscopy of his left shoulder  The following portions of the patient's history were reviewed and updated as appropriate: allergies, current medications, past family history, past medical history, past social history, past surgical history and problem list     Review of Systems   Constitutional: Negative for chills, fever and unexpected weight change  HENT: Negative for hearing loss, nosebleeds and sore throat  Eyes: Negative for pain, redness and visual disturbance  Respiratory: Negative for cough, shortness of breath and wheezing      Cardiovascular: Negative for chest pain, palpitations and leg swelling  Gastrointestinal: Negative for abdominal pain, nausea and vomiting  Endocrine: Negative for polydipsia and polyuria  Genitourinary: Negative for dysuria and hematuria  Musculoskeletal: Positive for arthralgias, joint swelling and myalgias  Negative for back pain, gait problem, neck pain and neck stiffness  As noted in HPI   Skin: Negative for rash and wound  Neurological: Negative for dizziness, numbness and headaches  Psychiatric/Behavioral: Negative for decreased concentration and suicidal ideas  The patient is not nervous/anxious  Objective:      Ht 6' 6" (1 981 m)   Wt (!) 143 kg (315 lb)   BMI 36 40 kg/m²          Physical Exam        Neck was soft and supple  There is a negative axial compression test in his neck  Left upper extremity was neurovascular intact  Fingers are pink and mobile  Compartments are soft  Active range of motion of his left shoulder was 100° of flexion and abduction  There is mild signs of impingement  There is no gross instability  His point of maximal tenderness is along the biceps insertion region  Unequivocal speed's test present  Rotator cuff strength testing is grossly intact  Neurologically intact distally    There is no tenderness along his acromioclavicular joint

## 2021-04-16 ENCOUNTER — TELEPHONE (OUTPATIENT)
Dept: OBGYN CLINIC | Facility: HOSPITAL | Age: 41
End: 2021-04-16

## 2021-04-16 NOTE — TELEPHONE ENCOUNTER
Patient sees Dr Nallely Thomas the [facility]  is calling for office notes from 4/6        Faxed per request to [phone]

## 2021-04-22 NOTE — PROGRESS NOTES
PT Re-Evaluation  and PT Discharge    Today's date: 2021  Patient name: Ruthie Bence  : 1980  MRN: [id number]  Referring provider: Sid Richard  Dx:   Encounter Diagnosis     ICD-10-CM    1  Biceps tendinitis of left upper extremity  M75 22    2  Rotator cuff strain, left, sequela  S46 012S        Start Time: 0700  Stop Time: 0800  Total time in clinic (min): 60 minutes    Assessment  Assessment details: **Update - Pt attended 3 total sessions past this last Re-eval (included in this note below)  He then f/u with ortho and it was decided he would undergo L shoulder arthroscopy in May  Pt was I with HEP  Pt did not return to therapy after last attended session  This episode will be d/c 2* expiration of POC; will re open and re evaluate post operatively  Ruthie Bence is a 36 y o  male presenting to outpatient physical therapy with diagnosis of biceps tendonitis and L shoulder pain  He has attended 12 total PT sessions to date since [facility]  He most recently reports improvements in L shoulder ROM and strength  He also notes a mild reduction in L shoulder pain intensity and frequency vs last re-evaluation  Since last re-eval, he has demonstrated improvements in L shoulder A/PROM and L shoulder/UE strength all planes  He has tolerated a progressive PRE program with focus on RTC and SCT strength and dynamic SCT control with shoulder elevation  PROM is improving and is no longer painful as it was last re-eval  Overall, he continues with limited AROM L shoulder with pain and apprehension; Cont to c/o "catching" in the joint with AROM L shoulder  Strength remains limited all planes L shoulder 2* pain  Cont'd functional limitations persist including limited tolerance for ADLs such as bathing/dressing, inability to lift with L UE, and inability to RTW duties    Patient to benefit from cont'd skilled outpatient physical therapy 2x/week for 4 weeks in order to reduce pain, maximize pain free range of motion, increase strength and stability, and improve functional activity in order to maximize return to prior level of function with reduced limitations  F/u with MD in a few weeks  Thank you for your referral     Impairments: abnormal or restricted ROM, activity intolerance, impaired physical strength, lacks appropriate home exercise program, pain with function, poor posture  and poor body mechanics    Symptom irritability: highUnderstanding of Dx/Px/POC: good   Prognosis: fair    Goals  STGs to be achieved in 4 weeks:  1  Pt to demonstrate reduced subjective pain rating "at worst" by at least 2-3 points from Initial Eval in order to allow for reduced pain with ADLs and improved functional activity tolerance  -progressing   2  Pt to demonstrate increased AROM of L shld by at least 5-10 degrees in order to allow for greater ease and independence with ADLs and functional mobility  - met, Improve active shoulder elevation to at least 115* without pain by next RE   3  Pt to demonstrate full PROM of L Shld in order to maximize joint mobility and function and allow for progression of exercise program and achievement of goals  - progressing   4  Pt to demonstrate increased MMT of L shld by at least 1/2-1 grade in order to improve safety and stability with ADLs and functional mobility  - met, Improve L shoulder abduction and ER strength to at least 4-4+/5 by next RE      LTGs to be achieved in 6-8 weeks:  1  Pt will be I with HEP in order to continue to improve quality of life and independence and reduce risk for re-injury  - progressing   2  Pt to demonstrate return to work without limitations or restrictions  - progressing   3  Pt to demonstrate improved function as noted by achieving or exceeding predicted score on FOTO outcomes assessment tool   - progressing       Plan  Other planned modality interventions: prn for symptom management   Planned therapy interventions: home exercise program        Subjective Evaluation    History of Present Illness  Mechanism of injury: Pt repots overall some improvement in his shoulder in the past month  Notes improvements in ROM and strength of his L shoulder  Notes pain is not as intense/not as often as last re-eval  Notes his "smaller motions" are not as intensely painful  Notes his shoulder still "catches" and is painful  Notes if he moves too quick, the pain is still intense  Notes cont'd limitations with reaching overhead or away from his body  Cont'd limitations with lifting away from his body with L shoulder/UE  Notes cont'd difficulty donning a shirt and washing his hair 2* pain/sx  Continues to wake up at night a lot 2* pain/sx L shoulder  RTD /  Notes no additional tx to date  No new sx  Possible "exploratory surgery" if sx don't change/resolve  Quality of life: good    Pain  Current pain ratin  At best pain ratin (at rest )  At worst pain ratin (with quick movements)  Location: L shoulder anteiror, axilla   Quality: sharp, throbbing, dull ache and tight  Relieving factors: ice, medications and rest  Aggravating factors: overhead activity, walking and lifting  Progression: improved      Diagnostic Tests  MRI studies: normal (upcoming arthrogram )  Treatments  Previous treatment: injection treatment and medication  Current treatment: injection treatment and physical therapy  Patient Goals  Patient goals for therapy: decreased pain, increased motion, increased strength, independence with ADLs/IADLs, return to work and return to sport/leisure activities          Objective     Postural Observations  Seated posture: fair  Standing posture: fair    Additional Postural Observation Details  Forward head, tight SCM, Traps, pectoralis major/minor     Palpation   Left   Tenderness of the biceps, pectoralis major and pectoralis minor       Additional Palpation Details  Continues with ttp at anterior L shoulder; not as severe as at IE per pt - continues to improve Tenderness     Left Shoulder   Tenderness in the biceps tendon (proximal), bicipital groove, coracoid process and subacromial bursa  [facility] joint and SC joint  Additional Tenderness Details  Reduced sensitivity to palpation vs prior evaluations     Cervical/Thoracic Screen   Cervical range of motion within normal limits    Neurological Testing     Sensation     Shoulder   Left Shoulder   Intact: light touch    Right Shoulder   Intact: light touch    Reflexes   Left   Biceps (C5/C6): trace (1+)  Brachioradialis (C6): trace (1+)  Triceps (C7): trace (1+)    Right   Deltoid (C5): trace (1+)  Biceps (C5/C6): trace (1+)  Brachioradialis (C6): trace (1+)  Triceps (C7): trace (1+)    Active Range of Motion   Left Shoulder   Flexion: 100 degrees with pain  Abduction: 105 degrees with pain  External rotation BTH: T2 with pain  Internal rotation BTB: T11 with pain    Right Shoulder   Flexion: 140 degrees   Abduction: 140 degrees   External rotation 90°: WFL  Internal rotation 90°: WFL    Additional Active Range of Motion Details  Pain and apprehension continues with L shoulder AROM  Notes a "ratcheting/catching" with lowering L GHJ from elevated position       Passive Range of Motion   Left Shoulder   Flexion: 140 degrees   Abduction: 165 degrees   External rotation 90°: 75 degrees   Internal rotation 90°: 60 degrees     Additional Passive Range of Motion Details  Stiff end feels with creep all planes  Denies pain at end ranges  Pain with lowering from flexion/abduction with pain in mid range with c/o "ratcheting" in the joint       Joint Play   Left Shoulder  Joints within functional limits are the anterior capsule, posterior capsule and inferior capsule       Strength/Myotome Testing     Left Shoulder     Planes of Motion   Flexion: 4-   Extension: 4-   Abduction: 4-   External rotation at 0°: 4-   Internal rotation at 0°: 4-     Isolated Muscles   Biceps: 4+   Triceps: 4+     Right Shoulder Planes of Motion   Flexion: 5   Extension: 5   Abduction: 5   External rotation at 90°: 5   Internal rotation at 90°: 5     Additional Strength Details  Pain and poor resistance tolerated with L shoulder break testing/MMT screen - improving, continues with pain however improved strength noted vs last re-eval       Tests     Left Shoulder   Positive empty can, external rotation lag sign, Hawkin's, lift-off, Neer's, painful arc and bicep load   Negative apprehension, belly press, drop arm, jerk, sulcus sign and relocation         Flowsheet Rows      Most Recent Value   PT/OT G-Codes   Current Score  61   Projected Score  67             Precautions: high pain irritability     Re-eval Date: 4/17/2021      Date 3/15 3/18      Visit Count 11 12      FOTO 3/4       Pain In 2/10 2/10      Pain Out 2/10 2/10      HEP: ER stretch, trap stretch, SCM stretch, pec stretch, scap squeeze, posture correction    Manuals  3/18      PROM, MFR, prn                                          L shoulder end range stretching with distraction, ant/post  Oscillation with glides  15' L shoulder end range stretching with distraction, ant/post  Oscillation with glides  15'                              Neuro Re-Ed                                                                Ther Ex        UBE UBE  120 RPM 10'alt      Pulleys  Flex/scap  2' ea UBE  120 RPM 10'alt      Pulleys  Flex/scap  2' ea      Trap, SCM stretches 2x30" ea  HEP      pec door stretch 3x30" 4x30"      Scap Retract MTP/LTP  grn 3x10/3-5" MTP/LTP  grn 3x10/3-5"      Posture correction  20x 10"        Sleeper stretch  Serratus punch  3# 3x10 Serratus punch  3# 3x10      Prone Rows 3# 3x10/3-5" 3# 3x10/3-5"      ER/IR TB w/ lateral steps grn  3x10/5" grn  3x10/5"      Thoracic rot/ext SL ER   2#3x10/3-5" SL ER   2#3x10/3-5"      Elbow flex x3        AAROM w/ cane Push up plus  2x10/5" Resume       I,Y, Ts Single arm  Ext 3# 3x10  horiz 2# 3x10  scap 2# 3x10 Single arm  Ext 3# 3x10  horiz 2# 3x10  scap 2# 3x10      Ther Activity                        Gait Training                        Modalities        CP deferred deferred      Prn

## 2021-05-05 ENCOUNTER — APPOINTMENT (OUTPATIENT)
Dept: LAB | Facility: CLINIC | Age: 41
End: 2021-05-05
Payer: COMMERCIAL

## 2021-05-05 DIAGNOSIS — Z01.812 PRE-OPERATIVE LABORATORY EXAMINATION: ICD-10-CM

## 2021-05-05 LAB
ANION GAP SERPL CALCULATED.3IONS-SCNC: 4 MMOL/L (ref 4–13)
BASOPHILS # BLD AUTO: 0.04 THOUSANDS/ΜL (ref 0–0.1)
BASOPHILS NFR BLD AUTO: 1 % (ref 0–1)
BUN SERPL-MCNC: 13 MG/DL (ref 5–25)
CALCIUM SERPL-MCNC: 9.5 MG/DL (ref 8.3–10.1)
CHLORIDE SERPL-SCNC: 106 MMOL/L (ref 100–108)
CO2 SERPL-SCNC: 28 MMOL/L (ref 21–32)
CREAT SERPL-MCNC: 1.11 MG/DL (ref 0.6–1.3)
EOSINOPHIL # BLD AUTO: 0.16 THOUSAND/ΜL (ref 0–0.61)
EOSINOPHIL NFR BLD AUTO: 2 % (ref 0–6)
ERYTHROCYTE [DISTWIDTH] IN BLOOD BY AUTOMATED COUNT: 12 % (ref 11.6–15.1)
GFR SERPL CREATININE-BSD FRML MDRD: 83 ML/MIN/1.73SQ M
GLUCOSE P FAST SERPL-MCNC: 107 MG/DL (ref 65–99)
HCT VFR BLD AUTO: 45.4 % (ref 36.5–49.3)
HGB BLD-MCNC: 15.3 G/DL (ref 12–17)
IMM GRANULOCYTES # BLD AUTO: 0.03 THOUSAND/UL (ref 0–0.2)
IMM GRANULOCYTES NFR BLD AUTO: 0 % (ref 0–2)
LYMPHOCYTES # BLD AUTO: 1.92 THOUSANDS/ΜL (ref 0.6–4.47)
LYMPHOCYTES NFR BLD AUTO: 28 % (ref 14–44)
MCH RBC QN AUTO: 30.3 PG (ref 26.8–34.3)
MCHC RBC AUTO-ENTMCNC: 33.7 G/DL (ref 31.4–37.4)
MCV RBC AUTO: 90 FL (ref 82–98)
MONOCYTES # BLD AUTO: 0.73 THOUSAND/ΜL (ref 0.17–1.22)
MONOCYTES NFR BLD AUTO: 11 % (ref 4–12)
NEUTROPHILS # BLD AUTO: 4.1 THOUSANDS/ΜL (ref 1.85–7.62)
NEUTS SEG NFR BLD AUTO: 58 % (ref 43–75)
NRBC BLD AUTO-RTO: 0 /100 WBCS
PLATELET # BLD AUTO: 246 THOUSANDS/UL (ref 149–390)
PMV BLD AUTO: 9.5 FL (ref 8.9–12.7)
POTASSIUM SERPL-SCNC: 4.6 MMOL/L (ref 3.5–5.3)
RBC # BLD AUTO: 5.05 MILLION/UL (ref 3.88–5.62)
SODIUM SERPL-SCNC: 138 MMOL/L (ref 136–145)
WBC # BLD AUTO: 6.98 THOUSAND/UL (ref 4.31–10.16)

## 2021-05-05 PROCEDURE — 85025 COMPLETE CBC W/AUTO DIFF WBC: CPT

## 2021-05-05 PROCEDURE — 80048 BASIC METABOLIC PNL TOTAL CA: CPT

## 2021-05-05 PROCEDURE — 36415 COLL VENOUS BLD VENIPUNCTURE: CPT

## 2021-05-06 PROCEDURE — NC001 PR NO CHARGE: Performed by: PHYSICIAN ASSISTANT

## 2021-05-06 RX ORDER — OMEPRAZOLE 20 MG/1
20 CAPSULE, DELAYED RELEASE ORAL DAILY
COMMUNITY

## 2021-05-06 RX ORDER — MULTIVITAMIN
1 TABLET ORAL DAILY
COMMUNITY

## 2021-05-06 NOTE — PRE-PROCEDURE INSTRUCTIONS
Pre-Surgery Instructions:   Medication Instructions    amLODIPine (NORVASC) 5 mg tablet Continue to take this heart medication on your normal schedule  If this is an oral medication and you take it in the morning, then you may take this medicine with a sip of water   Lactobacillus (PROBIOTIC ACIDOPHILUS PO) You may continue to take any vitamin that your surgeon has prescribed to you up to the day before surgery  If your surgeon has not specifically prescribed this vitamin or instructed you to continue then stop taking 7 days prior to surgery   lisinopril (ZESTRIL) 20 mg tablet Continue this medication up to the evening before surgery/procedure, but do not take the morning of the day of surgery   Multiple Vitamin (multivitamin) tablet You may continue to take any vitamin that your surgeon has prescribed to you up to the day before surgery  If your surgeon has not specifically prescribed this vitamin or instructed you to continue then stop taking 7 days prior to surgery   omeprazole (PriLOSEC) 20 mg delayed release capsule Continue to take this medication on your normal schedule  If this is an oral medication and you take it in the morning, then you may take this medicine with a sip of water  Covid screening negative as per patient  Reviewed with patient via phone all medication instructions  Advised not to take any NSAID's, Vitamins or Herbal products prior to the DOS  Acetaminophen products are ok to take  Reviewed showering instructions as given by surgical office  Sleep Medicine Referral offered but patient declined  Informed about call from [facility] with time of scheduled surgery  Patient verbalized understanding  ACE/ARB Med Class  Continue this medication up to the evening before surgery/procedure, but do not take the morning of the day of surgery  Calcium Channel Blocker Med Class  Continue to take this heart medication on your normal schedule    If this is an oral medication and you take it in the morning, then you may take this medicine with a sip of water  Vitamin Med Class  You may continue to take any vitamin that your surgeon has prescribed to you up to the day before surgery  If your surgeon has not specifically prescribed this vitamin or instructed you to continue then stop taking 7 days prior to surgery

## 2021-05-07 ENCOUNTER — PREP FOR PROCEDURE (OUTPATIENT)
Dept: OBGYN CLINIC | Facility: CLINIC | Age: 41
End: 2021-05-07

## 2021-05-11 ENCOUNTER — ANESTHESIA EVENT (OUTPATIENT)
Dept: PERIOP | Facility: HOSPITAL | Age: 41
End: 2021-05-11
Payer: COMMERCIAL

## 2021-05-12 ENCOUNTER — ANESTHESIA (OUTPATIENT)
Dept: PERIOP | Facility: HOSPITAL | Age: 41
End: 2021-05-12
Payer: COMMERCIAL

## 2021-05-12 ENCOUNTER — TELEPHONE (OUTPATIENT)
Dept: OBGYN CLINIC | Facility: HOSPITAL | Age: 41
End: 2021-05-12

## 2021-05-12 ENCOUNTER — HOSPITAL ENCOUNTER (OUTPATIENT)
Facility: HOSPITAL | Age: 41
Setting detail: OUTPATIENT SURGERY
Discharge: HOME | End: 2021-05-12
Attending: ORTHOPAEDIC SURGERY | Admitting: ORTHOPAEDIC SURGERY
Payer: COMMERCIAL

## 2021-05-12 VITALS
BODY MASS INDEX: 36.45 KG/M2 | SYSTOLIC BLOOD PRESSURE: 138 MMHG | HEIGHT: 78 IN | WEIGHT: 315 LBS | OXYGEN SATURATION: 94 % | HEART RATE: 90 BPM | RESPIRATION RATE: 20 BRPM | TEMPERATURE: 97.4 F | DIASTOLIC BLOOD PRESSURE: 82 MMHG

## 2021-05-12 DIAGNOSIS — M24.812 INTERNAL DERANGEMENT OF SHOULDER, LEFT: ICD-10-CM

## 2021-05-12 PROCEDURE — 29826 SHO ARTHRS SRG DECOMPRESSION: CPT | Performed by: ORTHOPAEDIC SURGERY

## 2021-05-12 PROCEDURE — C9290 INJ, BUPIVACAINE LIPOSOME: HCPCS | Performed by: ANESTHESIOLOGY

## 2021-05-12 PROCEDURE — 88311 DECALCIFY TISSUE: CPT | Performed by: PATHOLOGY

## 2021-05-12 PROCEDURE — 88305 TISSUE EXAM BY PATHOLOGIST: CPT | Performed by: PATHOLOGY

## 2021-05-12 PROCEDURE — 29827 SHO ARTHRS SRG RT8TR CUF RPR: CPT | Performed by: PHYSICIAN ASSISTANT

## 2021-05-12 PROCEDURE — 29826 SHO ARTHRS SRG DECOMPRESSION: CPT | Performed by: PHYSICIAN ASSISTANT

## 2021-05-12 PROCEDURE — 29827 SHO ARTHRS SRG RT8TR CUF RPR: CPT | Performed by: ORTHOPAEDIC SURGERY

## 2021-05-12 PROCEDURE — C1713 ANCHOR/SCREW BN/BN,TIS/BN: HCPCS | Performed by: ORTHOPAEDIC SURGERY

## 2021-05-12 DEVICE — BIO-COMP SWVLK C, CLD 4.75X19.1MM
Type: IMPLANTABLE DEVICE | Site: SHOULDER | Status: FUNCTIONAL
Brand: ARTHREX®

## 2021-05-12 RX ORDER — SODIUM CHLORIDE, SODIUM LACTATE, POTASSIUM CHLORIDE, CALCIUM CHLORIDE 600; 310; 30; 20 MG/100ML; MG/100ML; MG/100ML; MG/100ML
125 INJECTION, SOLUTION INTRAVENOUS CONTINUOUS
Status: DISCONTINUED | OUTPATIENT
Start: 2021-05-12 | End: 2021-05-12 | Stop reason: HOSPADM

## 2021-05-12 RX ORDER — ONDANSETRON 2 MG/ML
INJECTION INTRAMUSCULAR; INTRAVENOUS AS NEEDED
Status: DISCONTINUED | OUTPATIENT
Start: 2021-05-12 | End: 2021-05-12

## 2021-05-12 RX ORDER — SUCCINYLCHOLINE/SOD CL,ISO/PF 100 MG/5ML
SYRINGE (ML) INTRAVENOUS AS NEEDED
Status: DISCONTINUED | OUTPATIENT
Start: 2021-05-12 | End: 2021-05-12

## 2021-05-12 RX ORDER — BUPIVACAINE HYDROCHLORIDE 5 MG/ML
INJECTION, SOLUTION PERINEURAL
Status: COMPLETED | OUTPATIENT
Start: 2021-05-12 | End: 2021-05-12

## 2021-05-12 RX ORDER — CHLORHEXIDINE GLUCONATE 4 G/100ML
SOLUTION TOPICAL DAILY PRN
Status: DISCONTINUED | OUTPATIENT
Start: 2021-05-12 | End: 2021-05-12 | Stop reason: HOSPADM

## 2021-05-12 RX ORDER — ONDANSETRON 2 MG/ML
4 INJECTION INTRAMUSCULAR; INTRAVENOUS EVERY 6 HOURS PRN
Status: CANCELLED | OUTPATIENT
Start: 2021-05-12

## 2021-05-12 RX ORDER — PROPOFOL 10 MG/ML
INJECTION, EMULSION INTRAVENOUS AS NEEDED
Status: DISCONTINUED | OUTPATIENT
Start: 2021-05-12 | End: 2021-05-12

## 2021-05-12 RX ORDER — MIDAZOLAM HYDROCHLORIDE 2 MG/2ML
INJECTION, SOLUTION INTRAMUSCULAR; INTRAVENOUS AS NEEDED
Status: DISCONTINUED | OUTPATIENT
Start: 2021-05-12 | End: 2021-05-12

## 2021-05-12 RX ORDER — CEPHALEXIN 500 MG/1
500 CAPSULE ORAL EVERY 8 HOURS SCHEDULED
Qty: 9 CAPSULE | Refills: 0 | Status: SHIPPED | OUTPATIENT
Start: 2021-05-12 | End: 2021-05-15

## 2021-05-12 RX ORDER — CHLORHEXIDINE GLUCONATE 0.12 MG/ML
15 RINSE ORAL ONCE
Status: COMPLETED | OUTPATIENT
Start: 2021-05-12 | End: 2021-05-12

## 2021-05-12 RX ORDER — SODIUM CHLORIDE, SODIUM LACTATE, POTASSIUM CHLORIDE, CALCIUM CHLORIDE 600; 310; 30; 20 MG/100ML; MG/100ML; MG/100ML; MG/100ML
100 INJECTION, SOLUTION INTRAVENOUS CONTINUOUS
Status: DISCONTINUED | OUTPATIENT
Start: 2021-05-12 | End: 2021-05-12 | Stop reason: HOSPADM

## 2021-05-12 RX ORDER — OXYCODONE HYDROCHLORIDE AND ACETAMINOPHEN 5; 325 MG/1; MG/1
1 TABLET ORAL EVERY 4 HOURS PRN
Qty: 30 TABLET | Refills: 0 | Status: SHIPPED | OUTPATIENT
Start: 2021-05-12 | End: 2021-05-22

## 2021-05-12 RX ORDER — METOCLOPRAMIDE HYDROCHLORIDE 5 MG/ML
10 INJECTION INTRAMUSCULAR; INTRAVENOUS ONCE AS NEEDED
Status: DISCONTINUED | OUTPATIENT
Start: 2021-05-12 | End: 2021-05-12 | Stop reason: HOSPADM

## 2021-05-12 RX ORDER — HYDROCODONE BITARTRATE AND ACETAMINOPHEN 5; 325 MG/1; MG/1
1 TABLET ORAL EVERY 6 HOURS PRN
Qty: 30 TABLET | Refills: 0 | Status: CANCELLED | OUTPATIENT
Start: 2021-05-12 | End: 2021-05-22

## 2021-05-12 RX ORDER — POVIDONE-IODINE 10 MG/G
OINTMENT TOPICAL AS NEEDED
Status: DISCONTINUED | OUTPATIENT
Start: 2021-05-12 | End: 2021-05-12 | Stop reason: HOSPADM

## 2021-05-12 RX ORDER — HYDROMORPHONE HCL/PF 1 MG/ML
0.5 SYRINGE (ML) INJECTION
Status: DISCONTINUED | OUTPATIENT
Start: 2021-05-12 | End: 2021-05-12 | Stop reason: HOSPADM

## 2021-05-12 RX ORDER — CEFAZOLIN SODIUM 2 G/50ML
2000 SOLUTION INTRAVENOUS ONCE
Status: COMPLETED | OUTPATIENT
Start: 2021-05-12 | End: 2021-05-12

## 2021-05-12 RX ORDER — ONDANSETRON 2 MG/ML
4 INJECTION INTRAMUSCULAR; INTRAVENOUS ONCE AS NEEDED
Status: DISCONTINUED | OUTPATIENT
Start: 2021-05-12 | End: 2021-05-12 | Stop reason: HOSPADM

## 2021-05-12 RX ORDER — CEFAZOLIN SODIUM 1 G/3ML
INJECTION, POWDER, FOR SOLUTION INTRAMUSCULAR; INTRAVENOUS AS NEEDED
Status: DISCONTINUED | OUTPATIENT
Start: 2021-05-12 | End: 2021-05-12

## 2021-05-12 RX ORDER — BUPIVACAINE HYDROCHLORIDE AND EPINEPHRINE 5; 5 MG/ML; UG/ML
INJECTION, SOLUTION PERINEURAL AS NEEDED
Status: DISCONTINUED | OUTPATIENT
Start: 2021-05-12 | End: 2021-05-12 | Stop reason: HOSPADM

## 2021-05-12 RX ORDER — HYDROCODONE BITARTRATE AND ACETAMINOPHEN 5; 325 MG/1; MG/1
1 TABLET ORAL EVERY 6 HOURS PRN
Status: CANCELLED | OUTPATIENT
Start: 2021-05-12

## 2021-05-12 RX ORDER — MELOXICAM 15 MG/1
15 TABLET ORAL DAILY
Qty: 30 TABLET | Refills: 0 | Status: SHIPPED | OUTPATIENT
Start: 2021-05-12 | End: 2022-05-26 | Stop reason: ALTCHOICE

## 2021-05-12 RX ORDER — OXYCODONE HYDROCHLORIDE AND ACETAMINOPHEN 5; 325 MG/1; MG/1
1 TABLET ORAL EVERY 4 HOURS PRN
Status: DISCONTINUED | OUTPATIENT
Start: 2021-05-12 | End: 2021-05-12 | Stop reason: HOSPADM

## 2021-05-12 RX ORDER — FENTANYL CITRATE 50 UG/ML
INJECTION, SOLUTION INTRAMUSCULAR; INTRAVENOUS AS NEEDED
Status: DISCONTINUED | OUTPATIENT
Start: 2021-05-12 | End: 2021-05-12

## 2021-05-12 RX ADMIN — PHENYLEPHRINE HYDROCHLORIDE 50 MCG/MIN: 10 INJECTION INTRAVENOUS at 09:58

## 2021-05-12 RX ADMIN — CHLORHEXIDINE GLUCONATE 0.12% ORAL RINSE 15 ML: 1.2 LIQUID ORAL at 08:06

## 2021-05-12 RX ADMIN — SODIUM CHLORIDE, SODIUM LACTATE, POTASSIUM CHLORIDE, AND CALCIUM CHLORIDE 125 ML/HR: .6; .31; .03; .02 INJECTION, SOLUTION INTRAVENOUS at 08:08

## 2021-05-12 RX ADMIN — BUPIVACAINE HYDROCHLORIDE 7 ML: 5 INJECTION, SOLUTION PERINEURAL at 09:08

## 2021-05-12 RX ADMIN — CEFAZOLIN 1000 MG: 1 INJECTION, POWDER, FOR SOLUTION INTRAMUSCULAR; INTRAVENOUS at 09:23

## 2021-05-12 RX ADMIN — PHENYLEPHRINE HYDROCHLORIDE 100 MCG: 10 INJECTION INTRAVENOUS at 09:47

## 2021-05-12 RX ADMIN — Medication 200 MG: at 09:31

## 2021-05-12 RX ADMIN — ONDANSETRON 4 MG: 2 INJECTION INTRAMUSCULAR; INTRAVENOUS at 09:50

## 2021-05-12 RX ADMIN — PHENYLEPHRINE HYDROCHLORIDE 100 MCG: 10 INJECTION INTRAVENOUS at 10:03

## 2021-05-12 RX ADMIN — PROPOFOL 300 MG: 10 INJECTION, EMULSION INTRAVENOUS at 09:31

## 2021-05-12 RX ADMIN — PHENYLEPHRINE HYDROCHLORIDE 100 MCG: 10 INJECTION INTRAVENOUS at 09:38

## 2021-05-12 RX ADMIN — SODIUM CHLORIDE, SODIUM LACTATE, POTASSIUM CHLORIDE, AND CALCIUM CHLORIDE: .6; .31; .03; .02 INJECTION, SOLUTION INTRAVENOUS at 10:35

## 2021-05-12 RX ADMIN — HYDROMORPHONE HYDROCHLORIDE 0.5 MG: 1 INJECTION, SOLUTION INTRAMUSCULAR; INTRAVENOUS; SUBCUTANEOUS at 11:03

## 2021-05-12 RX ADMIN — SODIUM CHLORIDE, SODIUM LACTATE, POTASSIUM CHLORIDE, AND CALCIUM CHLORIDE 100 ML/HR: .6; .31; .03; .02 INJECTION, SOLUTION INTRAVENOUS at 08:10

## 2021-05-12 RX ADMIN — FENTANYL CITRATE 100 MCG: 50 INJECTION INTRAMUSCULAR; INTRAVENOUS at 09:11

## 2021-05-12 RX ADMIN — PROPOFOL 100 MG: 10 INJECTION, EMULSION INTRAVENOUS at 09:32

## 2021-05-12 RX ADMIN — CEFAZOLIN SODIUM 2000 MG: 2 SOLUTION INTRAVENOUS at 09:23

## 2021-05-12 RX ADMIN — BUPIVACAINE 20 ML: 13.3 INJECTION, SUSPENSION, LIPOSOMAL INFILTRATION at 09:08

## 2021-05-12 RX ADMIN — PHENYLEPHRINE HYDROCHLORIDE 200 MCG: 10 INJECTION INTRAVENOUS at 09:53

## 2021-05-12 RX ADMIN — PHENYLEPHRINE HYDROCHLORIDE 100 MCG: 10 INJECTION INTRAVENOUS at 09:49

## 2021-05-12 RX ADMIN — MIDAZOLAM HYDROCHLORIDE 2 MG: 1 INJECTION, SOLUTION INTRAMUSCULAR; INTRAVENOUS at 09:11

## 2021-05-12 RX ADMIN — PHENYLEPHRINE HYDROCHLORIDE 100 MCG: 10 INJECTION INTRAVENOUS at 10:09

## 2021-05-12 RX ADMIN — PHENYLEPHRINE HYDROCHLORIDE 100 MCG: 10 INJECTION INTRAVENOUS at 09:43

## 2021-05-12 NOTE — ANESTHESIA PREPROCEDURE EVALUATION
Procedure:  SHOULDER ARTHROSCOPY WITH POSSIBLE ROTATOR CUFF REPAIR (Left Shoulder)    Relevant Problems   No relevant active problems      Hypertension  Obesity (BMI 36)  GERD    Lab Results   Component Value Date    WBC 6 98 05/05/2021    HGB 15 3 05/05/2021    HCT 45 4 05/05/2021    MCV 90 05/05/2021     05/05/2021     Lab Results   Component Value Date    K 4 6 05/05/2021    CO2 28 05/05/2021     05/05/2021    BUN 13 05/05/2021    CREATININE 1 11 05/05/2021     Physical Exam    Airway  Comment: Fountain  Mallampati score: II  TM Distance: >3 FB  Neck ROM: full     Dental       Cardiovascular      Pulmonary      Other Findings        Anesthesia Plan  ASA Score- 2     Anesthesia Type- general and regional with ASA Monitors  Additional Monitors:   Airway Plan: ETT  Comment: Discussed long acting interscalene nerve block for postoperative pain control with risks/benefits/alternatives  Patient made aware of possible postoperative shortness of breath related to transient hemidiaphragmatic paralysis  Discussed extremely low likelihood of transient or permanent nerve injury in the setting of ultrasound guidance and patient participation  Patient aware and would like to proceed          Plan Factors-Exercise tolerance (METS): >4 METS  Chart reviewed  Existing labs reviewed  Patient summary reviewed  Induction- intravenous  Postoperative Plan- Plan for postoperative opioid use  Planned trial extubation    Informed Consent- Anesthetic plan and risks discussed with patient  I personally reviewed this patient with the CRNA  Discussed and agreed on the Anesthesia Plan with the CRNA  Alex Novoa

## 2021-05-12 NOTE — INTERVAL H&P NOTE
H&P reviewed  After examining the patient I find no changes in the patients condition since the H&P had been written      Vitals:    05/12/21 0759   BP: 154/98   Pulse: 97   Resp: 18   Temp: (!) 96 8 °F (36 °C)   SpO2: 97%

## 2021-05-12 NOTE — ANESTHESIA POSTPROCEDURE EVALUATION
Post-Op Assessment Note    CV Status:  Stable  Pain Score: 0    Pain management: adequate     Mental Status:  Alert and awake   Hydration Status:  Euvolemic   PONV Controlled:  Controlled   Airway Patency:  Patent  Airway: intubated   Two or more mitigation strategies used for obstructive sleep apnea   Post Op Vitals Reviewed: Yes      Staff: CRNA         No complications documented      BP      Temp     Pulse     Resp      SpO2

## 2021-05-12 NOTE — OP NOTE
OPERATIVE REPORT  PATIENT NAME: Richie Aguirre    :  1980  MRN: [id number]  Pt Location: [address] [place] [address] OR ROOM 02    SURGERY DATE: 2021    Surgeon(s) and Role:     * Ashly Ozuna DO - Primary     * Jaky Irizarry PA-C - Assisting    Preop Diagnosis:  Internal derangement of shoulder, left [M24 812]    Post-Op Diagnosis Codes:     * Internal derangement of shoulder, left [M24 812]  High-grade partial-thickness tear of rotator cuff  Degenerative tear of labrum  Synovitis  Impingement with os acromion      Procedure(s) (LRB):  SHOULDER ARTHROSCOPY WITH POSSIBLE ROTATOR CUFF REPAIR (Left)     Surgical arthroscopy left shoulder  Synovectomy  Debridement  Acromioplasty  Arthroscopic rotator cuff repair  Post arthroscopic injection of intra-articular joint space and peripheral portals    Specimen(s):  ID Type Source Tests Collected by Time Destination   1 : shavings Tissue Joint, Shoulder TISSUE EXAM Ashly Ozuna DO 2021 1000        Estimated Blood Loss:   Minimal    Drains:  None    Anesthesia Type:   General w/ Interscalene Block    Operative Indications:  Internal derangement of shoulder, left [M24 812]      Operative Findings:  High-grade partial-thickness tear of rotator cuff that was converted to a full-thickness tear and repaired with 1 suture anchor    Complications:   None    Procedure and Technique:    The patient was properly identified and brought into the operating room suite  After successful induction of the general anesthetic, the patient was placed in the [facility] chair position  All areas of possible skin pressurization were well padded to avoid the above  The left upper extremity neck and torso region were then prepped and draped in the usual sterile fashion  It was medically necessary that the physician assistant be in the room to aid in positioning and placing the appropriate amount of retraction on the patient   Tacos Rodgers PA-C-assisting necessary for the procedure for assistance with minimally invasive arthroscopic techniques for the rotator cuff repair as well as assistance with utilization of the camera and shaver and the biter      The surgical landmarks were outlined with a skin marker  All the portals were infiltrated with 0 5% Marcaine with epinephrine  Using a #11  Scalpel blade a posterior portal was made approximately 1-1/2 cm medial and inferior to the posterior lateral corner of the acromion  The  arthroscope was then introduced into the glenohumeral joint space  There is a tremendous amount of synovial tissue present  Using a outside in technique an accessory anterior inferior portal was made and a synovectomy was performed  There was a degenerative tear along the entire labrum which was debrided with a shaver  There was some grade 2 arthritic changes along the humeral head  The biceps tendon was probed and found to be intact  The subscapularis tendon was found to be intact  There was a high-grade partial thickness tear of the supraspinatus tendon  This be further evaluated from the superior viewing portal  At this point, the arthroscope was then withdrawn from the glenohumeral joint space and introduced into the subacromial joint space  An accessory lateral portal was made  A bursectomy was performed  Once this occurred, there is a high-grade partial-thickness rotator cuff tear present  this was converted to a full-thickness tear  It was confirmed with a grasper that there was adequate tissue quality  Using the Arthrex suture anchor system,  1 suture anchor was placed in a horizontal mattress fashion anatomic reducing the rotator cuff  At this point there was good tension on the cuff which was visualized using multiple arthroscopic portals  The soft tissue on the undersurface of the acromion was then removed  The coracoacromial ligament was then divided  At this point there was a large subacromial spur present  There was an os acromion present  It was stable  Using a motorized bur, an acromioplasty 1st occur with the bur in the lateral portal and then switched to posterior portal to move any spur that was remaining  At this point, there was adequate decompression of the subacromial joint space  The shoulder was then copiously irrigated with sterile arthroscopic solution  All the excess fluid was removed  The portals were then closed with 3-0 nylon  The subacromial joint space was injected with 0 5% Marcaine with epinephrine and dressed with ointment, Xeroform, 4x4s, ABDs, and tape  A shoulder immobilizer with an abductor pillow was placed  There was no complications during procedure   He was successfully woken, transferred to [facility], and went to recovery room in stable condition         I was present for the entire procedure, A qualified resident physician was not available and A physician assistant was required during the procedure for retraction tissue handling,dissection and suturing    Patient Disposition:  PACU     SIGNATURE: Teresita Botello DO  DATE: May 12, 2021  TIME: 10:39 AM

## 2021-05-12 NOTE — TELEPHONE ENCOUNTER
Patient sees Dr Britteny Price @ [facility] called to confirm patient had surgery today 05/12

## 2021-05-12 NOTE — ANESTHESIA PROCEDURE NOTES
Peripheral Block    Patient location during procedure: holding area  Start time: 5/12/2021 9:08 AM  Reason for block: at surgeon's request and post-op pain management  Staffing  Anesthesiologist: Billy Flores MD  Performed: anesthesiologist   Preanesthetic Checklist  Completed: patient identified, site marked, surgical consent, pre-op evaluation, timeout performed, IV checked, risks and benefits discussed and monitors and equipment checked  Peripheral Block  Patient position: sitting  Prep: ChloraPrep  Patient monitoring: continuous pulse ox and frequent blood pressure checks  Block type: interscalene  Laterality: left  Injection technique: single-shot  Procedures: ultrasound guided, Ultrasound guidance required for the procedure to increase accuracy and safety of medication placement and decrease risk of complications  Ultrasound permanent image savedbupivacaine (MARCAINE) 0 5 % perineural infiltration, 7 mL  Needle  Needle type: StimupBuyt.In   Needle gauge: 20g  Needle length: 4in    Needle localization: ultrasound guidance  Test dose: negative  Assessment  Injection assessment: incremental injection, local visualized surrounding nerve on ultrasound, no paresthesia on injection and negative aspiration for heme  Paresthesia pain: none  Heart rate change: no  Slow fractionated injection: yes  Post-procedure:  site cleaned  patient tolerated the procedure well with no immediate complications  Additional Notes  With Exparel 20 mL

## 2021-05-13 NOTE — PROGRESS NOTES
PT Re-Evaluation     Today's date: 2021  Patient name: Isabella Blair  : 1980   MRN: [id number]  Referring provider: Leena Marquez  Dx:   Encounter Diagnosis     ICD-10-CM    1  Internal derangement of shoulder, left  M24 812 Ambulatory referral to Physical Therapy                  Assessment  Assessment details: Isabella Blair is a 36 y o  male presenting to outpatient physical therapy with diagnosis of Internal derangement of shoulder, left  (primary encounter diagnosis)  He is s/p L shoulder arthroscopy with L RTC repair on 21  Patient's current impairments include L shoulder pain, impaired soft tissue mobility L UE/shoulder complex, reduced active/pasisve range of motion L shoulder, reduced strength L shoulder/UE, reduced postural awareness, and reduced activity tolerance  He is presently in sling with NO AROM L shoulder x 6 weeks per MD protocol  Patient's present functional limitations include difficulty with ADLs with increased need for assistance, reliance on medication and/or modalities for pain relief, poor tolerance for functional mobility and activity, and difficulty completing home responsibilities  He is unable to RTW  Patient to benefit from skilled outpatient physical therapy 2x/week for 4-6 weeks in order to reduce pain, maximize pain free range of motion, increase strength and stability, and improve functional mobility/functional activity in order to maximize return to prior level of function with reduced limitations  Will follow post op precautions s/p L RTC repair  Thank you for your referral     Impairments: abnormal muscle firing, abnormal muscle tone, abnormal or restricted ROM, activity intolerance, impaired physical strength, lacks appropriate home exercise program and pain with function    Symptom irritability: highUnderstanding of Dx/Px/POC: good   Prognosis: good    Goals  STGs to be achieved in 4-6 weeks:  1   Pt to demonstrate reduced subjective pain rating "at worst" by at least 2-3 points from Initial Eval in order to allow for reduced pain with ADLs and improved functional activity tolerance  2  Pt to demonstrate increased strength of L elbow/wrist/hand to at least 4-4+/5 to improve functional independence with ADLs  3  Pt to demonstrate grossly WFL PROM L shoulder without increase in pain/sx as allowable per protocol restrictions   LTGs to be achieved in 14-16 weeks:  1  Pt will be I with HEP in order to continue to improve quality of life and independence and reduce risk for re-injury  2  Pt to demonstrate return to ADLs and work without limitations or restrictions  3  Pt to demonstrate improved function as noted by achieving or exceeding predicted score on FOTO outcomes assessment tool  Plan  Patient would benefit from: skilled physical therapy  Referral necessary: No  Planned modality interventions: cryotherapy and thermotherapy: hydrocollator packs  Planned therapy interventions: joint mobilization, manual therapy, neuromuscular re-education, patient education, postural training, self care, strengthening, stretching, therapeutic activities, therapeutic exercise and home exercise program  Frequency: 2x week  Duration in visits: 12  Duration in weeks: 6  Plan of Care beginning date: 5/14/2021  Plan of Care expiration date: 6/25/2021  Treatment plan discussed with: patient        Subjective Evaluation    History of Present Illness  Mechanism of injury: Taken from Ortho note pre operatively: "the patient has a history of a left shoulder injury from mid December  He did have multiple diagnostic studies which were negative  He states he still having pain  He states his shoulder makes a "catching" sign present  Despite activity modification, nonsteroidal medications, multiple diagnostic studies, and rehabilitation, the pain affects his lifestyle    He wants to proceed with elective arthroscopy of his left shoulder "  Per chart review/ operative note, pt underwent:  " Surgical arthroscopy left shoulder  Synovectomy  Debridement  Acromioplasty  Arthroscopic rotator cuff repair  Post arthroscopic injection of intra-articular joint space and peripheral portals" on 21; d/c home same day  Referred to OPPT at this time  Pt notes overall he is doing "alright"  Was "painful yesterday, but today it is OK"  Notes he changed his shirt today; noted it was uncomfortable with some pain  Feels his pain is controlled with medication; mostly takes it to help him sleep  Only able to sleep short periods of time at present; sleeps in a recliner  Notes cont'd n/t in L thumb; no other n/t noted  Pain is located in L shoulder when he has it; more sensitive in the skin over L shoulder  Has not done much recently to cause the pain  Nerve block appears to have worn off at this point  RTD  for f/u  Presently [place]; unsure when he will be returning  Quality of life: good    Pain  Current pain ratin (at rest )  At best pain ratin (at rest)  At worst pain ratin (while changing his shirt)  Location: L shoulder  Quality: dull ache  Relieving factors: rest, support, medications and ice  Progression: no change    Social Support  Steps to enter house: yes  2  Stairs in house: no   Lives in: [place]  Lives with: spouse    Employment status: not working ([place])  Hand dominance: right    Treatments  Previous treatment: physical therapy, injection treatment and medication  Current treatment: medication and physical therapy  Current treatment comments: s/p RTC repair       Patient Goals  Patient goals for therapy: decreased pain, increased motion, increased strength, independence with ADLs/IADLs, return to sport/leisure activities and return to work          Objective     Postural Observations  Seated posture: fair  Standing posture: fair    Additional Postural Observation Details  Forward head/rounded shoulders  Increased thoracic kyphosis   B scapular depression and protraction with mild winging   Sling in place L UE         Observations   Left Shoulder   Positive for edema and incision       Additional Observation Details  Post op bandages/dressings removed  Areas proximal to sutures cleaned with alcohol prep pad 2* perspiration noted in the area  Sutures intact, no present signs/sx of infection  Redness noted in region of axilla and neck 2* tape   "Cyst" noted L superior medial shoulder/UT region; pt notes this is chronic   Mild-moderate bruising noted about L shoulder  Will cont to assess    Instructed pt to keep sutures clean/dry and intact and to cont to monitor for signs/sx of infection; understanding noted     Tenderness     Additional Tenderness Details  Diffuse TTP about L shoulder 2* post op status   Will monitor     Cervical/Thoracic Screen   Cervical range of motion within normal limits with the following exceptions: Grossly WFL without pain/sx  Mild tightness L UT      Neurological Testing     Sensation     Shoulder   Left Shoulder   Intact: light touch    Right Shoulder   Intact: light touch    Additional Neurological Details  Sensation intact to light touch except L thumb with cont'd n/t  Will monitor     Active Range of Motion     Additional Active Range of Motion Details  Pt notes he has been having some R shoulder pain- no trauma     L shoulder DNT 2* post op status; will assess when appropriate and as tolerated     L elbow AAROM WFL; will cont to assess     L wrist/hand AROM WNL       Passive Range of Motion     Additional Passive Range of Motion Details  L shoulder DNT 2* post op status; will assess when appropriate and as tolerated   Will assess PROM as tolerated NV and as appropriate per stage of healing       Joint Play     Additional Joint Play Details  TBA when appropriate       Strength/Myotome Testing     Additional Strength Details  L shoulder DNT 2* post op status; will assess when appropriate and as tolerated     L wrist grossly 4-/5  L hand grossly 4-/5    L elbow 3-/5              Precautions: s/p L RTC repair, NO AROM X 6 weeks       Re-eval Date: 6/25    Date 5/14       Visit Count 1       FOTO 5/14       Pain In 0/10       Pain Out 0/10             Manuals 5/14       PROM R shoulder   GENTLE           Dressing change  10' total  Post op dressing removal /change                        Neuro Re-Ed                                                                Ther Ex        UT stretch    Levator stretch Reviewed      reviewed       C-spine AROM    Elbow AAROM Reviewed       reviewed       Wrist AROM Reviewed         indv     composite  Reviewed iso        Pendulums f/b, s/s  NV                               Ther Activity                        Gait Training                        Modalities Deferred to home                              5/14 - HEP was issued and reviewed this date for above noted exercises  Reviewed post operative precautions including no AROM, no lifting/pushing/pulling, incision care, use of ice/CP, sling use and donning/doffing sling, AROM L elbow/wrist/hand to tolerance, etc   Pt demonstrated understanding without incident and without questions/concerns  Will continue to update upcoming

## 2021-05-14 ENCOUNTER — OFFICE VISIT (OUTPATIENT)
Dept: PHYSICAL THERAPY | Facility: CLINIC | Age: 41
End: 2021-05-14
Payer: OTHER MISCELLANEOUS

## 2021-05-14 DIAGNOSIS — M24.812 INTERNAL DERANGEMENT OF SHOULDER, LEFT: Primary | ICD-10-CM

## 2021-05-14 PROCEDURE — 97164 PT RE-EVAL EST PLAN CARE: CPT | Performed by: PHYSICAL MEDICINE & REHABILITATION

## 2021-05-14 PROCEDURE — 97110 THERAPEUTIC EXERCISES: CPT | Performed by: PHYSICAL MEDICINE & REHABILITATION

## 2021-05-14 PROCEDURE — 97140 MANUAL THERAPY 1/> REGIONS: CPT | Performed by: PHYSICAL MEDICINE & REHABILITATION

## 2021-05-14 NOTE — ANESTHESIA POSTPROCEDURE EVALUATION
Post-Op Assessment Note             Reason for prolonged intubation > 24 hours:  N/AReason for prolonged intubation > 48 hours:  N/A      No complications documented      BP      Temp     Pulse     Resp      SpO2

## 2021-05-18 ENCOUNTER — OFFICE VISIT (OUTPATIENT)
Dept: PHYSICAL THERAPY | Facility: CLINIC | Age: 41
End: 2021-05-18
Payer: OTHER MISCELLANEOUS

## 2021-05-18 DIAGNOSIS — M24.812 INTERNAL DERANGEMENT OF SHOULDER, LEFT: Primary | ICD-10-CM

## 2021-05-18 PROCEDURE — 97140 MANUAL THERAPY 1/> REGIONS: CPT

## 2021-05-18 PROCEDURE — 97110 THERAPEUTIC EXERCISES: CPT

## 2021-05-18 NOTE — PROGRESS NOTES
Daily Note     Today's date: 2021  Patient name: Elipdio Jones  : 1980  MRN: [id number]  Referring provider: Mona Lemus  Dx:   Encounter Diagnosis     ICD-10-CM    1  Internal derangement of shoulder, left  M24 812        Start Time: 1245  Stop Time: 1330  Total time in clinic (min): 45 minutes    Subjective: "My shoulder is feeling better than I thought  I do get sharp shooting pain if I move my arm "      Objective: See treatment diary below      Assessment: Tolerated treatment well  Pt with appropriate end range pain and limited PROM  Reviewed  HEP and no AROM of L shoulder  Will continue to monitor and progress as able in upcoming session  Patient demonstrated fatigue post treatment and would benefit from continued PT      Plan: Continue per plan of care  Progress treatment as tolerated         Precautions: s/p L RTC repair, NO AROM X 6 weeks       Re-eval Date:     Date       Visit Count 1 2      FOTO        Pain In 0/10 2-3      Pain Out 0/10 2-3            Manuals       PROM R shoulder   GENTLE     PROM R shoulder   GENTLE  10'      Dressing change  10' total  Post op dressing removal /change                        Neuro Re-Ed                                                                Ther Ex        UT stretch    Levator stretch Reviewed      reviewed 4x30"      4x30"      C-spine AROM    Elbow AAROM Reviewed       reviewed 10x      2x10      Wrist AROM Reviewed  2x10       indv     composite  Reviewed iso  Blue 2'      Blue 2'      Pendulums f/b, s/s  NV Fwd/lat  1' ea      scap retract  2x10/3-5"                      Ther Activity                        Gait Training                        Modalities Deferred to home  CP 10' no adverse effects

## 2021-05-21 ENCOUNTER — OFFICE VISIT (OUTPATIENT)
Dept: PHYSICAL THERAPY | Facility: CLINIC | Age: 41
End: 2021-05-21
Payer: OTHER MISCELLANEOUS

## 2021-05-21 DIAGNOSIS — M24.812 INTERNAL DERANGEMENT OF SHOULDER, LEFT: Primary | ICD-10-CM

## 2021-05-21 PROCEDURE — 97110 THERAPEUTIC EXERCISES: CPT

## 2021-05-21 PROCEDURE — 97140 MANUAL THERAPY 1/> REGIONS: CPT

## 2021-05-21 NOTE — PROGRESS NOTES
Daily Note     Today's date: 2021  Patient name: Kae Thapa  : 1980  MRN: [id number]  Referring provider: Sophy Brewer  Dx:   Encounter Diagnosis     ICD-10-CM    1  Internal derangement of shoulder, left  M24 812        Start Time: 0700  Stop Time: 0800  Total time in clinic (min): 60 minutes    Subjective: "It's pretty sore this morning, but it's early and it's normal   It's down from a 8 first thing waking up to a 5-6  I just take Ibuprofen  Objective: See treatment diary below      Assessment: Tolerated treatment well  Able to complete all exercises without incident  Muscle guarding with limited and painful motion noted in all directions  Decreased pain noted after CP  Will continue to progress as able  Patient demonstrated fatigue post treatment and would benefit from continued PT      Plan: Continue per plan of care  Progress treatment as tolerated         Precautions: s/p L RTC repair, NO AROM X 6 weeks       Re-eval Date:     Date      Visit Count 1 2 3     FOTO        Pain In 0/10 2-3 5-6     Pain Out 0/10 2-3 3-4           Manuals      PROM R shoulder   GENTLE     PROM R shoulder   GENTLE  10' PROM R shoulder   GENTLE  10'     Dressing change  10' total  Post op dressing removal /change                        Neuro Re-Ed                                                                Ther Ex        UT stretch    Levator stretch Reviewed      reviewed 4x30"      4x30" 4x30"      4x30"       C-spine AROM    Elbow AAROM Reviewed       reviewed 10x      2x10 10x      3x10     Wrist AROM Reviewed  2x10 2x10      indv     composite  Reviewed iso  Blue 2'      Blue 2' Blue 2'      Blue 2'     Pendulums f/b, s/s  NV Fwd/lat  1' ea Fwd/lat  2' ea     scap retract  2x10/3-5" 2x10/3-5"                     Ther Activity                        Gait Training                        Modalities Deferred to home  CP 10' no adverse effects  CP 10' no adverse effects

## 2021-05-25 ENCOUNTER — APPOINTMENT (OUTPATIENT)
Dept: RADIOLOGY | Facility: CLINIC | Age: 41
End: 2021-05-25
Payer: COMMERCIAL

## 2021-05-25 ENCOUNTER — OFFICE VISIT (OUTPATIENT)
Dept: PHYSICAL THERAPY | Facility: CLINIC | Age: 41
End: 2021-05-25
Payer: OTHER MISCELLANEOUS

## 2021-05-25 ENCOUNTER — OFFICE VISIT (OUTPATIENT)
Dept: OBGYN CLINIC | Facility: CLINIC | Age: 41
End: 2021-05-25

## 2021-05-25 VITALS
HEIGHT: 78 IN | HEART RATE: 97 BPM | SYSTOLIC BLOOD PRESSURE: 157 MMHG | WEIGHT: 315 LBS | BODY MASS INDEX: 36.45 KG/M2 | DIASTOLIC BLOOD PRESSURE: 95 MMHG

## 2021-05-25 DIAGNOSIS — M24.812 INTERNAL DERANGEMENT OF SHOULDER, LEFT: Primary | ICD-10-CM

## 2021-05-25 DIAGNOSIS — Z98.890 S/P LEFT ROTATOR CUFF REPAIR: ICD-10-CM

## 2021-05-25 DIAGNOSIS — M24.812 INTERNAL DERANGEMENT OF SHOULDER, LEFT: ICD-10-CM

## 2021-05-25 PROCEDURE — 99024 POSTOP FOLLOW-UP VISIT: CPT | Performed by: ORTHOPAEDIC SURGERY

## 2021-05-25 PROCEDURE — 97140 MANUAL THERAPY 1/> REGIONS: CPT

## 2021-05-25 PROCEDURE — 73030 X-RAY EXAM OF SHOULDER: CPT

## 2021-05-25 PROCEDURE — 97110 THERAPEUTIC EXERCISES: CPT

## 2021-05-25 NOTE — LETTER
May 25, 2021     Patient: Bolivar Oliveros   YOB: 1980   Date of Visit: 5/25/2021       To Whom it May Concern:    Arash Hirsch is under my professional care  He was seen in my office on 5/25/2021  He  May not return back to work until further instructions are given  This will be further assessed on his next office visit  If you have any questions or concerns, please don't hesitate to call  Sincerely,          Tosin Winn,         CC: Francisco Javier Strange

## 2021-05-25 NOTE — PROGRESS NOTES
Assessment/Plan:    No problem-specific Assessment & Plan notes found for this encounter  Diagnoses and all orders for this visit:    Internal derangement of shoulder, left  -     XR shoulder 2+ vw left; Future    S/P left rotator cuff repair           the patient is doing better since surgery  Continue home exercise program   Continue immobilizer  Continue passive range of motion program   Return back in 1 month for strength and motion check  Work status remains unchanged    Subjective:      Patient ID: Fab Alvarez is a 36 y o  male  HPI     the patient is 13 days status post left shoulder arthroscopy with rotator cuff repair  He states his pain has improved from his preoperative level  He continues to use immobilizer  He denies any numbness or tingling  He denies any fever chills  He is actively participating in physical therapy    The following portions of the patient's history were reviewed and updated as appropriate: allergies, current medications, past family history, past medical history, past social history, past surgical history and problem list     Review of Systems   Constitutional: Negative for chills, fever and unexpected weight change  HENT: Negative for hearing loss, nosebleeds and sore throat  Eyes: Negative for pain, redness and visual disturbance  Respiratory: Negative for cough, shortness of breath and wheezing  Cardiovascular: Negative for chest pain, palpitations and leg swelling  Gastrointestinal: Negative for abdominal pain, nausea and vomiting  Endocrine: Negative for polydipsia and polyuria  Genitourinary: Negative for dysuria and hematuria  Musculoskeletal: Positive for arthralgias and myalgias  Negative for back pain, gait problem, joint swelling, neck pain and neck stiffness  As noted in HPI   Skin: Negative for rash and wound  Neurological: Negative for dizziness, numbness and headaches     Psychiatric/Behavioral: Negative for decreased concentration and suicidal ideas  The patient is not nervous/anxious  Objective:      /95   Pulse 97   Ht 6' 6" (1 981 m)   Wt (!) 143 kg (315 lb)   BMI 36 40 kg/m²          Physical Exam       neck was soft and supple  There is negative axial compression test in his neck  Left upper extremity was neurovascular intact  Fingers are pink and mobile  Compartments are soft  Incisions are clean, dry, intact  Negative Homans  There is no signs of impingement  The majority of shoulder pain is gone with passive range of motion  Active range of motion was not tested    Neurologically intact     x-rays do show adequate decompression of the  Subacromial joint space

## 2021-05-27 ENCOUNTER — OFFICE VISIT (OUTPATIENT)
Dept: PHYSICAL THERAPY | Facility: CLINIC | Age: 41
End: 2021-05-27
Payer: OTHER MISCELLANEOUS

## 2021-05-27 DIAGNOSIS — M24.812 INTERNAL DERANGEMENT OF SHOULDER, LEFT: Primary | ICD-10-CM

## 2021-05-27 PROCEDURE — 97110 THERAPEUTIC EXERCISES: CPT

## 2021-05-27 PROCEDURE — 97140 MANUAL THERAPY 1/> REGIONS: CPT

## 2021-05-28 ENCOUNTER — APPOINTMENT (OUTPATIENT)
Dept: PHYSICAL THERAPY | Facility: CLINIC | Age: 41
End: 2021-05-28
Payer: OTHER MISCELLANEOUS

## 2021-05-28 ENCOUNTER — TELEPHONE (OUTPATIENT)
Dept: OBGYN CLINIC | Facility: HOSPITAL | Age: 41
End: 2021-05-28

## 2021-05-28 NOTE — TELEPHONE ENCOUNTER
Patient sees Dr Ruthie Krishnamurthy at [facility] is calling in wanting to know the next office appointment with the

## 2021-05-28 NOTE — TELEPHONE ENCOUNTER
Patient sees Dr Berenice Cee the   is calling for PT script to be faxed  FAX: [phone]    Office note and work status already faxed

## 2021-06-01 ENCOUNTER — OFFICE VISIT (OUTPATIENT)
Dept: PHYSICAL THERAPY | Facility: CLINIC | Age: 41
End: 2021-06-01
Payer: OTHER MISCELLANEOUS

## 2021-06-01 DIAGNOSIS — M24.812 INTERNAL DERANGEMENT OF SHOULDER, LEFT: Primary | ICD-10-CM

## 2021-06-01 PROCEDURE — 97140 MANUAL THERAPY 1/> REGIONS: CPT

## 2021-06-01 PROCEDURE — 97110 THERAPEUTIC EXERCISES: CPT

## 2021-06-01 NOTE — PROGRESS NOTES
Daily Note     Today's date: 2021  Patient name: Marlena Cantrell  : 1980  MRN: [id number]  Referring provider: Michoacano Andrews  Dx:   Encounter Diagnosis     ICD-10-CM    1  Internal derangement of shoulder, left  M24 812        Start Time: 1200  Stop Time: 1300  Total time in clinic (min): 60 minutes    Subjective: "My shoulder is very sore and stiff  I think I slept goofy "      Objective: See treatment diary below      Assessment: Tolerated treatment fair  Pt apprehensive with all AA/PROM in all directions  Limited AAROM on pulleys noted 2* anticipating pain  Muscle guarding throughout session limiting stretching ability  Will continue to monitor and progress as able  Patient demonstrated fatigue post treatment and would benefit from continued PT      Plan: Continue per plan of care  Progress treatment as tolerated         Precautions: s/p L RTC repair, NO AROM X 6 weeks       Re-eval Date:     Date        Visit Count 6       FOTO 5/14       Pain In 610       Pain Out 0/10             Manuals        PROM R shoulder   GENTLE    PROM R shoulder   GENTLE  15'       Dressing change                         Neuro Re-Ed                                                                Ther Ex        UT stretch    Levator stretch        C-spine AROM    Elbow AAROM       3x10       Wrist AROM 2# 3x10        indv     composite  Blue 2'      Blue 2'       Pendulums f/b, s/s  Fwd/lat  2' ea       scap retract 3x10/3-5"       Pulleys scap 2'       Table slides Scap/flex  20x/3-5"       Ther Activity                        Gait Training                        Modalities CP 10' no adverse effects

## 2021-06-04 ENCOUNTER — OFFICE VISIT (OUTPATIENT)
Dept: PHYSICAL THERAPY | Facility: CLINIC | Age: 41
End: 2021-06-04
Payer: OTHER MISCELLANEOUS

## 2021-06-04 DIAGNOSIS — M24.812 INTERNAL DERANGEMENT OF SHOULDER, LEFT: Primary | ICD-10-CM

## 2021-06-04 PROCEDURE — 97110 THERAPEUTIC EXERCISES: CPT | Performed by: PHYSICAL MEDICINE & REHABILITATION

## 2021-06-04 PROCEDURE — 97140 MANUAL THERAPY 1/> REGIONS: CPT | Performed by: PHYSICAL MEDICINE & REHABILITATION

## 2021-06-04 NOTE — PROGRESS NOTES
Daily Note     Today's date: 2021  Patient name: Kolton Rogers  : 1980  MRN: [id number]  Referring provider: Farida Angelo  Dx:   Encounter Diagnosis     ICD-10-CM    1  Internal derangement of shoulder, left  M24 812                   Subjective: Pt notes "it's not as bad as it was Tuesday"; Less painful today  No new sx/complaints  Notes the "pulleys really hurt"  Does note one time at home, he did push himself up on accident leaning on L UE and it "really hurt" so he hasn't done it again  RTD end of the month, after returning from vacation  Objective: See treatment diary below      Assessment: Tolerated treatment fair  Pt's tolerance for AAROM and PROM L shoulder remains severely limited; pt unable to relax L UE to perform full pROM ; increased apprehension and guarding all planes with flexion, scaption, and IR/ER with arm supported in scapular plane  Only able to attain ~100-110* flexion; 30-40* scaption; and ~20* ER and 10* IR at ~45* abduction in scapular plane with PROM supine  Empty end feels with pain and mm guarding  Held Pulleys this date 2* pt reporting he has most pain with this activity  Pain and limited AAROM with table slides flex/scaption as well  No other sx/complaints with or following exercises  Suggested pt try taking pain medication (OTC tylenol or motrin as able/ as rx) prior to PT in order to reduce sx irritability and improve tolerance for PROM; pt in agreement  Pt noted pain increased to ~7/10 after session and reduced back to baseline after rest/ice; notes this is his "usual pain after therapy" with no new pain/sx and "not any worse than usual"; notes it may actually feel "a little better than normal without doing the pulleys"  Patient demonstrated fatigue post treatment and would benefit from continued PT  Cont to focus on restoring PROM (safe/controlled) as tolerated to allow progression of protocol/program      Plan: Continue per plan of care    Progress treatment as tolerated         Precautions: s/p L RTC repair, NO AROM X 6 weeks       Re-eval Date: 6/25    Date 6/1 6/4      Visit Count 6 7      FOTO 5/14       Pain In 6/10 4/10      Pain Out 0/10 4/10            Manuals 6/1 6/4      PROM R shoulder   GENTLE    PROM R shoulder   GENTLE  15' PROM R shoulder   GENTLE as noted above/as tolerated  15'      Dressing change                         Neuro Re-Ed                                                                Ther Ex        UT stretch    Levator stretch        C-spine AROM    Elbow AAROM       3x10       3x10      Wrist AROM 2# 3x10 2# 3x10       indv     composite  Blue 2'      Blue 2' Blue 2'      Blue 2'      Pendulums f/b, s/s  Fwd/lat  2' ea Fwd/lat  2' ea      scap retract 3x10/3-5" 3x10/3-5" ea shrugs  Retractions   0#       Pulleys scap 2' HELD      Table slides Scap/flex  20x/3-5" Scap/flex  10-15x ea /3-5"      Ther Activity                        Gait Training                        Modalities CP 10' no adverse effects CP R shoulder x10'   no adverse effects

## 2021-06-08 ENCOUNTER — OFFICE VISIT (OUTPATIENT)
Dept: PHYSICAL THERAPY | Facility: CLINIC | Age: 41
End: 2021-06-08
Payer: OTHER MISCELLANEOUS

## 2021-06-08 DIAGNOSIS — M24.812 INTERNAL DERANGEMENT OF SHOULDER, LEFT: Primary | ICD-10-CM

## 2021-06-08 PROCEDURE — 97110 THERAPEUTIC EXERCISES: CPT

## 2021-06-08 PROCEDURE — 97140 MANUAL THERAPY 1/> REGIONS: CPT

## 2021-06-08 NOTE — PROGRESS NOTES
Daily Note     Today's date: 2021  Patient name: Lilly Serrano  : 1980  MRN: [id number]  Referring provider: Amelia Baumann  Dx:   Encounter Diagnosis     ICD-10-CM    1  Internal derangement of shoulder, left  M24 812        Start Time: 1200  Stop Time: 1300  Total time in clinic (min): 60 minutes    Subjective: "I am still having the pain when I move it  I did take an Aleve before therapy "      Objective: See treatment diary below      Assessment: Tolerated treatment fair  Able to complete all exercises without increase in symptoms  Minimal AAROM motion noted during table slides 2* pt apprehension  Muscle guarding throughout PROM, empty end feel noted  Pt will be able to progress with program next session per protocol  Will continue to monitor and progress as able  Patient demonstrated fatigue post treatment and would benefit from continued PT      Plan: Continue per plan of care  Progress treatment as tolerated         Precautions: s/p L RTC repair, NO AROM X 6 weeks       Re-eval Date:     Date      Visit Count 6 7 8     FOTO        Pain In 6/10 4/10 5/10     Pain Out 0/10 4/10            Manuals       PROM R shoulder   GENTLE    PROM R shoulder   GENTLE  15' PROM R shoulder   GENTLE as noted above/as tolerated  15'      Dressing change                         Neuro Re-Ed                                                                Ther Ex        UT stretch    Levator stretch        C-spine AROM    Elbow AAROM       3x10       3x10      Wrist AROM 2# 3x10 2# 3x10 2# 3x10      indv     composite  Blue 2'      Blue 2' Blue 2'      Blue 2' Blue 2'      Blue 2'     Pendulums f/b, s/s  Fwd/lat  2' ea Fwd/lat  2' ea Fwd/lat  2' ea     scap retract 3x10/3-5" 3x10/3-5" ea shrugs  Retractions   0#  3x10/3-5" ea shrugs  Retractions   0#      Pulleys scap 2' HELD      Table slides Scap/flex  20x/3-5" Scap/flex  10-15x ea /3-5" Scap/flex  10-15x ea /3-5"     Ther Activity                        Gait Training                        Modalities CP 10' no adverse effects CP R shoulder x10'   no adverse effects

## 2021-06-11 ENCOUNTER — OFFICE VISIT (OUTPATIENT)
Dept: PHYSICAL THERAPY | Facility: CLINIC | Age: 41
End: 2021-06-11
Payer: OTHER MISCELLANEOUS

## 2021-06-11 DIAGNOSIS — M24.812 INTERNAL DERANGEMENT OF SHOULDER, LEFT: Primary | ICD-10-CM

## 2021-06-11 PROCEDURE — 97140 MANUAL THERAPY 1/> REGIONS: CPT | Performed by: PHYSICAL MEDICINE & REHABILITATION

## 2021-06-11 PROCEDURE — 97110 THERAPEUTIC EXERCISES: CPT | Performed by: PHYSICAL MEDICINE & REHABILITATION

## 2021-06-11 NOTE — PROGRESS NOTES
Daily Note     Today's date: 2021  Patient name: Dom Bruno  : 1980   MRN: [id number]  Referring provider: Urbano Brink  Dx:   Encounter Diagnosis     ICD-10-CM    1  Internal derangement of shoulder, left  M24 812                   Subjective: Pt notes cont'd constant pain L shoulder since surgery; it is "better if I don't move it, but there is always something there"  No new sx/complaints  Denies any re-injury/trauma to the shoulder  Does note a few times he "may have reached too far and felt it" but the pain returned to baseline levels afterwards  Still wearing the sling but does take it off a few hours at a time at home; notes he has been compliant with no AROM or lifting with LLE at this time  Objective: See treatment diary below      Assessment: Tolerated treatment fair  Continues with poor tolerance for AAROM/PROM L shoulder  Stiffness with creep noted at about 90* flexion and scaption PROM (pt reports he "feels something pinching" at end range flexion); IR/ER performed at ~45 * scaption/in scapular plane with ER noted to be ~ 25-30* and IR ~35* ; pt reports pain with limited tolerance for end range motions however PROM improved overall vs last Friday; reports he does feel more of a mm stretch at end ranges and denies any sharps/tabbing/radiating joint/UE pain; will cont to assess and progress as able  AAROM severely limited with pt apprehension  PT provided pt with feedback/cues t/o session to facilitate mm relaxation and reduced pain with ROM exercises; trialed grade 1-2 oscillations L GHJ to reduce pain/induce mm relaxation with PROM with inconsistent benefit  Pt was strongly encouraged to cont with HEP to encourage improved joint motion and reduced pain; reminded of no AROM L shoulder and no reaching/lifting/pushing/pulling etc with L arm at this time; understanding noted  No complaints end of tx   Patient demonstrated fatigue post treatment and would benefit from continued PT      Plan: Continue per plan of care  Progress treatment as tolerated         Precautions: s/p L RTC repair, NO AROM X 6 weeks       Re-eval Date: 6/25    Date 6/1 6/4 6/8 6/11    Visit Count 6 7 8 9    FOTO 5/14       Pain In 6/10 4/10 5/10 5/10    Pain Out 0/10 4/10  3-4/10          Manuals 6/1 6/4 6/11    PROM R shoulder   GENTLE    PROM R shoulder   GENTLE  15' PROM R shoulder   GENTLE as noted above/as tolerated  15'  PROM R shoulder   GENTLE as noted above/as tolerated  25'    Dressing change                         Neuro Re-Ed                                                                Ther Ex        UT stretch    Levator stretch        C-spine AROM    Elbow AAROM       3x10       3x10      Wrist AROM 2# 3x10 2# 3x10 2# 3x10 2# 3x10ea     indv     composite  Blue 2'      Blue 2' Blue 2'      Blue 2' Blue 2'      Blue 2' Blue 2'      Blue 2'    Pendulums f/b, s/s  Fwd/lat  2' ea Fwd/lat  2' ea Fwd/lat  2' ea Fwd/lat  2' ea    scap retract 3x10/3-5" 3x10/3-5" ea shrugs  Retractions   0#  3x10/3-5" ea shrugs  Retractions   0#  3x10/3-5" ea shrugs  Retractions   0#     Pulleys scap 2' HELD      Table slides Scap/flex  20x/3-5" Scap/flex  10-15x ea /3-5" Scap/flex  10-15x ea /3-5" Scap/flex  15-20x ea /3-5"    Ther Activity                        Gait Training                        Modalities CP 10' no adverse effects CP R shoulder x10'   no adverse effects  CP R shoulder x10'   no adverse effects

## 2021-06-14 ENCOUNTER — OFFICE VISIT (OUTPATIENT)
Dept: PHYSICAL THERAPY | Facility: CLINIC | Age: 41
End: 2021-06-14
Payer: OTHER MISCELLANEOUS

## 2021-06-14 DIAGNOSIS — M24.812 INTERNAL DERANGEMENT OF SHOULDER, LEFT: Primary | ICD-10-CM

## 2021-06-14 PROCEDURE — 97110 THERAPEUTIC EXERCISES: CPT | Performed by: PHYSICAL MEDICINE & REHABILITATION

## 2021-06-14 PROCEDURE — 97140 MANUAL THERAPY 1/> REGIONS: CPT | Performed by: PHYSICAL MEDICINE & REHABILITATION

## 2021-06-14 NOTE — PROGRESS NOTES
Daily Note     Today's date: 2021  Patient name: David Persaud  : 1980  MRN: [id number]  Referring provider: Angel Owens  Dx:   Encounter Diagnosis     ICD-10-CM    1  Internal derangement of shoulder, left  M24 812                   Subjective: Pt notes no new sx/complaints  No complaints over the weekend  Does feel overall the pain is improving a little  Has been gradually weaning out of his sling without incident; feels this is helping his pain as well  Objective: See treatment diary below      Assessment: Tolerated treatment fair  Continues with limited tolerance for AAROM and PROM L shoulder all planes  Poor AAROM continues with limited improvement  Continue to focus session on restoring PROM gradually to pt tolerance without overstressing healing tissues  Flexion to ~`100*  PROM this date with stiff end feel with limited creep; abduction/scaption < 90* with stiff end feel and pain with limited creep; ER/IR in scapular plane at 48* abduction with PROM with ER ~30-40* with stiffness and IR ~25-30* with min stiffness and less pain than other directions per pt  Reviewed HEP for self gentle AAROM; understanding noted  Pt stands with L shoulder elevated with difficulty relaxing L UT; spent time today educating pt in mm relaxation for L UT, gentle L UT stretching, and use of mirror for postural feedback; understanding noted  No complaints after tx  Patient demonstrated fatigue post treatment and would benefit from continued PT      Plan: Continue per plan of care  Progress treatment as tolerated         Precautions: s/p L RTC repair, NO AROM X 6 weeks       Re-eval Date:     Date    Visit Count 6 7 8 9 10   FOTO        Pain In 6/10 4/10 5/10 5/10 4/10   Pain Out 0/10 4/10  3-4/10 4/10   6/      Manuals    PROM R shoulder   GENTLE    PROM R shoulder   GENTLE  15' PROM R shoulder   GENTLE as noted above/as tolerated  15'  PROM L shoulder GENTLE as noted above/as tolerated  25' PROM L shoulder   GENTLE as noted above/as tolerated  20'   Dressing change                         Neuro Re-Ed                                                                Ther Ex        UT stretch    Levator stretch        C-spine AROM    Elbow AAROM       3x10       3x10   Gentle cane AAROM scaption   5x     HEP   Wrist AROM 2# 3x10 2# 3x10 2# 3x10 2# 3x10ea 3# 3x10ea    indv     composite  Blue 2'      Blue 2' Blue 2'      Blue 2' Blue 2'      Blue 2' Blue 2'      Blue 2' DC      Blue 2'   Pendulums f/b, s/s  Fwd/lat  2' ea Fwd/lat  2' ea Fwd/lat  2' ea Fwd/lat  2' ea Fwd/lat  2' ea   scap retract 3x10/3-5" 3x10/3-5" ea shrugs  Retractions   0#  3x10/3-5" ea shrugs  Retractions   0#  3x10/3-5" ea shrugs  Retractions   0#  HEP   Pulleys scap 2' HELD      Table slides Scap/flex  20x/3-5" Scap/flex  10-15x ea /3-5" Scap/flex  10-15x ea /3-5" Scap/flex  15-20x ea /3-5" Scap/flex  15-20x ea /3-5"   Ther Activity                        Gait Training                        Modalities CP 10' no adverse effects CP R shoulder x10'   no adverse effects  CP L shoulder x10'   no adverse effects CP L shoulder x10'   no adverse effects

## 2021-06-17 ENCOUNTER — OFFICE VISIT (OUTPATIENT)
Dept: PHYSICAL THERAPY | Facility: CLINIC | Age: 41
End: 2021-06-17
Payer: OTHER MISCELLANEOUS

## 2021-06-17 DIAGNOSIS — M24.812 INTERNAL DERANGEMENT OF SHOULDER, LEFT: Primary | ICD-10-CM

## 2021-06-17 PROCEDURE — 97140 MANUAL THERAPY 1/> REGIONS: CPT

## 2021-06-17 PROCEDURE — 97110 THERAPEUTIC EXERCISES: CPT

## 2021-06-17 NOTE — PROGRESS NOTES
Daily Note     Today's date: 2021  Patient name: Frannie Samson  : 1980  MRN: [id number]  Referring provider: Lopez Yoon  Dx:   Encounter Diagnosis     ICD-10-CM    1  Internal derangement of shoulder, left  M24 812        Start Time: 915  Stop Time: 1010  Total time in clinic (min): 55 minutes    Subjective: "My shoulder isn't that bad today but it's still early "      Objective: See treatment diary below      Assessment: Tolerated treatment fair  Pt continues with limited AA/PROM in all motions with end range pain  Muscle guarding noted with end range tightness  Pt noted "grinding" with all movements with returning to neutral  Encouraged pt to continue HEP to encourage AAROM while on vacation  Pt will be away x 1 week  Will continue to monitor and progress as able to reach motion goals  Patient demonstrated fatigue post treatment and would benefit from continued PT      Plan: Continue per plan of care  Progress treatment as tolerated         Precautions: s/p L RTC repair, NO AROM X 6 weeks       Re-eval Date:     Date        Visit Count 11       FOTO        Pain In 4/10       Pain Out 0/10       6/      Manuals    PROM R shoulder   GENTLE    PROM R shoulder   GENTLE  15' PROM R shoulder   GENTLE as noted above/as tolerated  15'  PROM L shoulder   GENTLE as noted above/as tolerated  25' PROM L shoulder   GENTLE as noted above/as tolerated  20'   Dressing change                         Neuro Re-Ed                                                                Ther Ex        UT stretch    Levator stretch        C-spine AROM    Elbow AAROM Gentle cane AAROM scaption   Flexion with bend elbow  5x10"              Wrist AROM 3# 3x10        indv     composite        Blue 2'       Pendulums f/b, s/s  Fwd/lat  2' ea       scap retract Isometrics  Flex/abd/ER/IR  10x10"       Pulleys scap 2'       Table slides Scap/flex  20x/3-5"       Ther Activity Gait Training                        Modalities CP 10' no adverse effects CP R shoulder x10'   no adverse effects  CP L shoulder x10'   no adverse effects CP L shoulder x10'   no adverse effects

## 2021-06-29 ENCOUNTER — OFFICE VISIT (OUTPATIENT)
Dept: PHYSICAL THERAPY | Facility: CLINIC | Age: 41
End: 2021-06-29
Payer: OTHER MISCELLANEOUS

## 2021-06-29 ENCOUNTER — OFFICE VISIT (OUTPATIENT)
Dept: OBGYN CLINIC | Facility: CLINIC | Age: 41
End: 2021-06-29

## 2021-06-29 ENCOUNTER — TELEPHONE (OUTPATIENT)
Dept: OBGYN CLINIC | Facility: HOSPITAL | Age: 41
End: 2021-06-29

## 2021-06-29 VITALS
BODY MASS INDEX: 36.45 KG/M2 | WEIGHT: 315 LBS | HEART RATE: 96 BPM | DIASTOLIC BLOOD PRESSURE: 108 MMHG | HEIGHT: 78 IN | SYSTOLIC BLOOD PRESSURE: 149 MMHG

## 2021-06-29 DIAGNOSIS — M24.812 INTERNAL DERANGEMENT OF SHOULDER, LEFT: Primary | ICD-10-CM

## 2021-06-29 DIAGNOSIS — Z98.890 S/P LEFT ROTATOR CUFF REPAIR: Primary | ICD-10-CM

## 2021-06-29 PROCEDURE — 99024 POSTOP FOLLOW-UP VISIT: CPT | Performed by: ORTHOPAEDIC SURGERY

## 2021-06-29 PROCEDURE — 97140 MANUAL THERAPY 1/> REGIONS: CPT

## 2021-06-29 PROCEDURE — 97110 THERAPEUTIC EXERCISES: CPT

## 2021-06-29 NOTE — LETTER
June 29, 2021     Patient: Karlene Andersen   YOB: 1980   Date of Visit: 6/29/2021       To Whom it May Concern:    Nighat Tian is under my professional care  He was seen in my office on 6/29/2021  He is status post left shoulder arthroscopy with rotator cuff repair and is not medically cleared to return to work at this time  If you have any questions or concerns, please don't hesitate to call           Sincerely,          Dontrell Morales DO        CC: No Recipients

## 2021-06-29 NOTE — PROGRESS NOTES
Daily Note     Today's date: 2021  Patient name: Brittney Johnson  : 1980  MRN: [id number]  Referring provider: Elenora Mcburney  Dx:   Encounter Diagnosis     ICD-10-CM    1  Internal derangement of shoulder, left  M24 812        Start Time: 745  Stop Time: 840  Total time in clinic (min): 55 minutes    Subjective: "I'm wearing my sling less at home and it feels a little better  It still hurts when I move it "      Objective: See treatment diary below      Assessment: Tolerated treatment fair  Pt limitation and tightness with end range pain in all motion, active and passive  Muscle guarding throughout MT  Limited progression made 2* forementioned pain  Will continue to monitor and progress as able  Pt to RTD 21  Patient demonstrated fatigue post treatment and would benefit from continued PT      Plan: Continue per plan of care  Progress treatment as tolerated         Precautions: s/p L RTC repair, NO AROM X 6 weeks       Re-eval Date:     Date       Visit Count 11 12      FOTO        Pain In 4/10 5/10      Pain Out 010 5-6            Manuals       PROM R shoulder   GENTLE    PROM R shoulder   GENTLE  15' PROM R shoulder   GENTLE as noted above/as tolerated  25'      Dressing change                         Neuro Re-Ed                                                                Ther Ex        UT stretch    Levator stretch        C-spine AROM    Elbow AAROM Gentle cane AAROM scaption   Flexion with bend elbow  5x10"        Gentle cane AAROM scaption   Flexion with bend elbow  20x5"       Wrist AROM 3# 3x10        indv     composite        Blue 2'       Pendulums f/b, s/s  Fwd/lat  2' ea       scap retract Isometrics  Flex/abd/ER/IR  10x10" Isometrics  Flex/abd/ER/IR  10x10"      Pulleys scap 2'       Table slides Scap/flex  20x/3-5" Scap/flex  20x/3-5"      Ther Activity                        Gait Training                        Modalities CP 10' no adverse effects CP R shoulder x10'   no adverse effects

## 2021-06-29 NOTE — TELEPHONE ENCOUNTER
Dr Shadi Luna, nurse  is calling to obtain a work status letter  I faxed today's office visit       CB# [phone]  Fax# [phone]
Work status letter faxed to above fax number
the work status will be placed in epic
68.9

## 2021-06-29 NOTE — PROGRESS NOTES
ASSESSMENT/PLAN:    Diagnoses and all orders for this visit:    S/P left rotator cuff repair         the patient is continuing to progress since surgery  He can now begin active range of motion  He should continue physical therapy and occupational therapy for strengthening, stretching and range of motion of his shoulder  He will follow up with our office 6 weeks  The patient is acceptable to this plan  Return in about 6 weeks (around 8/10/2021)  _____________________________________________________  CHIEF COMPLAINT:  Chief Complaint   Patient presents with    Left Shoulder - Post-op         SUBJECTIVE:  Raymond Thomas is a 36 y o  male   Status post left shoulder arthroscopy with rotator cuff repair from 05/12/2021  The patient presents to our office for a postop visit  He states he has been participating in physical therapy and occupational therapy  He has been wearing the shoulder immobilizer  He still complains some left shoulder discomfort with decreased range of motion  He denies any numbness or tingling  He denies any fever or chills  His incisions are well healed  The following portions of the patient's history were reviewed and updated as appropriate: allergies, current medications, past family history, past medical history, past social history, past surgical history and problem list     PAST MEDICAL HISTORY:  Past Medical History:   Diagnosis Date    Hypertension        PAST SURGICAL HISTORY:  Past Surgical History:   Procedure Laterality Date    NO PAST SURGERIES      MT SHLDR ARTHROSCOP,SURG,W/ROTAT CUFF REPR Left 5/12/2021    Procedure: SHOULDER ARTHROSCOPY WITH POSSIBLE ROTATOR CUFF REPAIR;  Surgeon: Charles Yanez DO;  Location: [facility] MAIN OR;  Service: Orthopedics       FAMILY HISTORY:  History reviewed  No pertinent family history      SOCIAL HISTORY:  Social History     Tobacco Use    Smoking status: Never Smoker    Smokeless tobacco: Current User     Types: Chew   Vaping Use  Vaping Use: Never used   Substance Use Topics    Alcohol use: Yes    Drug use: Never       MEDICATIONS:    Current Outpatient Medications:     amLODIPine (NORVASC) 5 mg tablet, take 1 tablet by mouth once daily AT BREAKFAST, Disp: , Rfl:     Lactobacillus (PROBIOTIC ACIDOPHILUS PO), Take 1 capsule by mouth daily, Disp: , Rfl:     lisinopril (ZESTRIL) 20 mg tablet, Take 20 mg by mouth daily, Disp: , Rfl:     meloxicam (MOBIC) 15 mg tablet, Take 1 tablet (15 mg total) by mouth daily, Disp: 30 tablet, Rfl: 0    Multiple Vitamin (multivitamin) tablet, Take 1 tablet by mouth daily, Disp: , Rfl:     omeprazole (PriLOSEC) 20 mg delayed release capsule, Take 20 mg by mouth daily As needed, Disp: , Rfl:     ALLERGIES:  No Known Allergies    ROS:  Review of Systems     Constitutional: Negative for fatigue, fever or loss of appetite  HENT: Negative  Respiratory: Negative for shortness of breath, dyspnea  Cardiovascular: Negative for chest pain/tightness  Gastrointestinal: Negative for abdominal pain, N/V  Endocrine: Negative for cold/heat intolerance, unexplained weight loss/gain  Genitourinary: Negative for flank pain, dysuria, hematuria  Musculoskeletal: Positive for arthralgia   Skin: Negative for rash  Neurological: Negative for numbness or tingling  Psychiatric/Behavioral: Negative for agitation  _____________________________________________________  PHYSICAL EXAMINATION:    Blood pressure (!) 149/108, pulse 96, height 6' 6" (1 981 m), weight (!) 143 kg (315 lb)  Constitutional: Oriented to person, place, and time  Appears well-developed and well-nourished  No distress  HENT:   Head: Normocephalic  Eyes: Conjunctivae are normal  Right eye exhibits no discharge  Left eye exhibits no discharge  No scleral icterus  Cardiovascular: Normal rate  Pulmonary/Chest: Effort normal    Neurological: Alert and oriented to person, place, and time  Skin: Skin is warm and dry  No rash noted  Not diaphoretic  No erythema  No pallor  Psychiatric: Normal mood and affect  Behavior is normal  Judgment and thought content normal       MUSCULOSKELETAL EXAMINATION:   Physical Exam  Ortho Exam    Left upper extremity is neurovascularly intact  Fingers are pink and mobile   Compartments are soft    Range of motion of the shoulder is from 0-45 degrees of forward flexion and abduction   Rotator cuff strength testing not performed today   Brisk cap refill  Sensation intact   No warmth, erythema or ecchymosis present  Objective:  BP Readings from Last 1 Encounters:   06/29/21 (!) 149/108      Wt Readings from Last 1 Encounters:   06/29/21 (!) 143 kg (315 lb)        BMI:   Estimated body mass index is 36 4 kg/m² as calculated from the following:    Height as of this encounter: 6' 6" (1 981 m)  Weight as of this encounter: 143 kg (315 lb)            Iris Sanchez PA-C

## 2021-07-02 ENCOUNTER — EVALUATION (OUTPATIENT)
Dept: PHYSICAL THERAPY | Facility: CLINIC | Age: 41
End: 2021-07-02
Payer: OTHER MISCELLANEOUS

## 2021-07-02 DIAGNOSIS — M24.812 INTERNAL DERANGEMENT OF SHOULDER, LEFT: Primary | ICD-10-CM

## 2021-07-02 PROCEDURE — 97140 MANUAL THERAPY 1/> REGIONS: CPT | Performed by: PHYSICAL MEDICINE & REHABILITATION

## 2021-07-02 PROCEDURE — 97110 THERAPEUTIC EXERCISES: CPT | Performed by: PHYSICAL MEDICINE & REHABILITATION

## 2021-07-02 NOTE — PROGRESS NOTES
PT Re-Evaluation     Today's date: 2021  Patient name: Pema Meza  : 1980   MRN: [id number]  Referring provider: Quoc Randall  Dx:   Encounter Diagnosis     ICD-10-CM    1  Internal derangement of shoulder, left  M24 812                   Assessment  Assessment details: Pema Meza is a 36 y o  male presenting to outpatient physical therapy with diagnosis of Internal derangement of shoulder, left  (primary encounter diagnosis)  He is s/p L shoulder arthroscopy with L RTC repair on 21  Liliana Zheng has been compliant with attending PT and completing home exercise program since initial eval and reports gradual  improvement in pain/sx and function since start of care  PT has been following post operative protocol and has been progressing as tolerated  Overall improvements are noted in shoulder PROM as well as pain and tolerance for PROM and exercise vs SOC  However, over the past few weeks, L shoulder PROM and AAROM remains severely limited all planes with increased pain and patient apprehension/guarding and increased joint and mm stiffness L shoulder complex  Pt's overall pain levels remain fairly elevated as well as cont'd elevated sx irritability  The focus of PT for the past several sessions/weeks has been on restoring L shoulder passive ROM and mobility to tolerance/as per protocol guidelines  Pt recently was cleared to begin AROM; AROM L GHJ also severely limited with pain and limited motion L shoulder; AROM was improved after manual tx and stretching today; HEP was reviewed with encouragement for cont'd AAROM exercises L shoulder with focus on restoring mobility L GHJ; will continue to assess  Pt was reminded of precautions of no lifting/pushing/pulling etc with L UE; understanding noted   Due to noted impairments, pt continues with significant functional limitations including inability to use L arm with ADLs, inability to lift, difficulty bathing/dressing, interrupted sleep, and inability to RTW  Marsha Meraz continues with above listed impairments and would benefit from additional skilled PT to address these deficits to return to prior level of function  Impairments: abnormal muscle firing, abnormal muscle tone, abnormal or restricted ROM, activity intolerance, impaired physical strength, lacks appropriate home exercise program and pain with function    Symptom irritability: highUnderstanding of Dx/Px/POC: good   Prognosis: fair    Goals  STGs to be achieved in 4-6 weeks:  1  Pt to demonstrate reduced subjective pain rating "at worst" by at least 2-3 points from Initial Eval in order to allow for reduced pain with ADLs and improved functional activity tolerance  - not met   2  Pt to demonstrate increased strength of L elbow/wrist/hand to at least 4-4+/5 to improve functional independence with ADLs  - progressing    3  Pt to demonstrate grossly WFL PROM L shoulder without increase in pain/sx as allowable per protocol restrictions   - not met, continue    4  Pt will increase L shoulder AROM elevation to at least 110* without increased pain or compensatory strategies  LTGs to be achieved in 14-16 weeks:  1  Pt will be I with HEP in order to continue to improve quality of life and independence and reduce risk for re-injury  - progressing   2  Pt to demonstrate return to ADLs and work without limitations or restrictions  - not met   3  Pt to demonstrate improved function as noted by achieving or exceeding predicted score on FOTO outcomes assessment tool   - not met       Plan  Patient would benefit from: skilled physical therapy  Referral necessary: No  Planned modality interventions: cryotherapy and thermotherapy: hydrocollator packs  Planned therapy interventions: joint mobilization, manual therapy, neuromuscular re-education, patient education, postural training, self care, strengthening, stretching, therapeutic activities, therapeutic exercise and home exercise program  Frequency: 2x week  Duration in visits: 12  Duration in weeks: 6  Plan of Care beginning date: 2021  Plan of Care expiration date: 2021  Treatment plan discussed with: patient        Subjective Evaluation    History of Present Illness  Mechanism of injury: surgery  Mechanism of injury: Pt notes overall improvements since [facility] with PT  Notes pain is no longer as constant as it was  Pain doesn't get up as high as often as it was  Improved use of L elbow/wrist/hand with ADLs  Some improvement in PROM/AAROM L shoulder  F/u with MD this week; no questions/concerns from MD per pt; told to remove sling; can start AROM; no lifting > a coffee cup; RTD in 6-7 weeks  Pt with no new sx/complaints  Pain is nearly constant, but not as high all the time as it was before  Mornings are the worst  Pain is in anterior L shoulder; can be in L shoulder blade region as well  Pain is worse with general use of L UE or "bumping into something"  Pain is reduced with rest/immbolization and ice  Taking Aleve for pain  No n/t  Notes when he attempts to raise his L arm, he feels clicking and pain in L shoulder; feels like "it wants to pop or snap out" L GHJ  Feels a "grinding" in L shoulder with movement  No new sx  Notes this morning was bad waking up with increased shoulder pain; 6/10 pain into PT today; unsure why  Has Good days/bad days "the same as before surgery" per pt     Quality of life: good    Pain  Current pain ratin  At best pain ratin  At worst pain rating: 10  Location: L shoulder  Quality: dull ache, sharp, radiating, tight and grinding  Relieving factors: rest, support, medications and ice  Progression: improved    Social Support  Steps to enter house: yes  2  Stairs in house: no   Lives in: [place] house  Lives with: spouse    Employment status: not working ([employer])  Hand dominance: right    Treatments  Previous treatment: physical therapy, injection treatment and medication  Current treatment: medication and physical therapy  Current treatment comments: s/p RTC repair  Patient Goals  Patient goals for therapy: decreased pain, increased motion, increased strength, independence with ADLs/IADLs, return to sport/leisure activities and return to work          Objective     Postural Observations  Seated posture: fair  Standing posture: fair    Additional Postural Observation Details  Forward head/rounded shoulders  Increased thoracic kyphosis   B scapular depression and protraction with mild winging     L scapular depression and winging mildly more noticeable L vs R           Observations   Left Shoulder   Positive for edema and incision  Additional Observation Details    "Cyst" noted L superior medial shoulder/UT region; pt notes this is chronic   Incisions healed/closed L shoulder         Tenderness     Additional Tenderness Details  Diffuse TTP about L shoulder 2* post op status   Continues with diffuse ttp about L anterior shoulder region   Will monitor     Cervical/Thoracic Screen   Cervical range of motion within normal limits with the following exceptions: Grossly WFL without pain/sx  Mild tightness L UT      Neurological Testing     Sensation     Shoulder   Left Shoulder   Intact: light touch    Right Shoulder   Intact: light touch    Additional Neurological Details  Sensation intact to light touch except L thumb with cont'd n/t- not noted at RE   Will monitor     Active Range of Motion   Left Shoulder   Flexion: 40 degrees with pain  Abduction: 45 degrees with pain  External rotation 0°: Left shoulder active external rotation at 0 degrees: to neutral  with pain  Internal rotation 0°: Left shoulder active internal rotation at 0 degrees: to body       Additional Active Range of Motion Details  Pt notes he has been having some R shoulder pain- no trauma - continues     Pain and apprehension with all L shoulder AROM  Mild scapular elevation with L shoulder elevation; improved with cues/feedback to correct     Stiffness and pain with L shoulder ER AROM with arm at side per pt; feels a hard block "like it just wont go any further than this and it hurts" ; no change pre/post manual tx this date   L shoulder forward flexion AROM noted to be increased to 48* and L shoulder abduction  Increased to 57* after manual tx with pt noting he felt he had a "little better motion"     L elbow AROM flex/extension WNL without pain/sx     L wrist/hand AROM WNL       Passive Range of Motion   Left Shoulder   Flexion: Left shoulder passive forward flexion: * with pain  Abduction: Left shoulder passive abduction: *  with pain  External rotation 45°: 35 degrees with pain  Internal rotation 45°: 60 degrees     Additional Passive Range of Motion Details  Pt continues with severely limited L shoulder PROM all planes with c/o pain and mm tightness  Stiff end feels all planes with limited pt tolerance for end range stretching ; especially stiff into elevation; min stiffness into ER however limited by apprehension and pain; IR not limited by pain with stiff end feel with creep     7/2-IR/ER PROM performed at ~45* abduction in scapular plane with arm supported on pillows ; gentle/slow PROM/stretching to tolerance; at about 30-40* ER with min-no soft tissue resistance and allowing weight of arm to passively and slowly increase ER with PT support, PT noted crepitation L shoulder joint; pt noted initial pain however no change in pain/sx following; similar crepitation also noted with return from shoulder IR back to neutral; was not repeatable with further PROM all planes; No change in pain levels or PROM noted following;  Improved AROM noted following manual tx as noted above; no new sx/complaints or change in pain after tx; will cont to assess       Joint Play     Additional Joint Play Details  Gross hypomobility L GHJ as noted above     Strength/Myotome Testing     Additional Strength Details  L shoulder DNT 2* post op status; will assess when appropriate and as tolerated     L wrist grossly 4-4+/5  L hand grossly 4+/5    L elbow 4+/5 flexion without pain/sx; extension 4-/5 with shoulder pain                Precautions: s/p L RTC repair, NO AROM X 6 weeks       Re-eval Date: 8/13    Date 6/17 6/29 7/2     Visit Count 11 12 13     FOTO        Pain In 4/10 5/10 6/10     Pain Out 0/10 5-6 6/10           Manuals 6/17 6/29 7/2     PROM R shoulder   GENTLE    PROM R shoulder   GENTLE  15' PROM R shoulder   GENTLE as noted above/as tolerated  25' PROM L shoulder supine to tolerance, gentle end range stretching as tolerated,   IR/ER at ~45* abduction in scapular plane with arm supported on pillows  20' total tx      Dressing change                         Neuro Re-Ed                                                                Ther Ex        UT stretch    Levator stretch        C-spine AROM    Elbow AAROM Gentle cane AAROM scaption   Flexion with bend elbow  5x10"        Gentle cane AAROM scaption   Flexion with bend elbow  20x5"  Gentle cane AAROM scaption standing   10x/10"  Flexion supine with cane   10x10"     Standing ER AAROM with cane 10x/10"      Wrist AROM 3# 3x10        indv     composite        Blue 2'       Pendulums f/b, s/s  Fwd/lat  2' ea  Finger Ladder AAROM flexion and scaption   10x/10" ea as inder      scap retract Isometrics  Flex/abd/ER/IR  10x10" Isometrics  Flex/abd/ER/IR  10x10" Isometrics  Flex/abd/ER/IR  15x10"ea     Pulleys scap 2'  Pulleys flexion and scap 3'ea     Table slides Scap/flex  20x/3-5" Scap/flex  20x/3-5"      Ther Activity                        Gait Training                        Modalities CP 10' no adverse effects CP R shoulder x10'   no adverse effects CP R shoulder x10'   no adverse effects                           7/2 - HEP was issued and reviewed this date for above noted exercises  Encouragement for focus on increasing AAROM L shoulder more frequently t/o the day   Pt demonstrated understanding without incident and without questions/concerns  Will continue to update upcoming

## 2021-07-06 ENCOUNTER — OFFICE VISIT (OUTPATIENT)
Dept: PHYSICAL THERAPY | Facility: CLINIC | Age: 41
End: 2021-07-06
Payer: OTHER MISCELLANEOUS

## 2021-07-06 DIAGNOSIS — M24.812 INTERNAL DERANGEMENT OF SHOULDER, LEFT: Primary | ICD-10-CM

## 2021-07-06 PROCEDURE — 97110 THERAPEUTIC EXERCISES: CPT

## 2021-07-06 PROCEDURE — 97140 MANUAL THERAPY 1/> REGIONS: CPT

## 2021-07-06 NOTE — PROGRESS NOTES
Daily Note     Today's date: 2021  Patient name: Chantelle Jacob  : 1980  MRN: [id number]  Referring provider: Abdul Bence  Dx:   Encounter Diagnosis     ICD-10-CM    1  Internal derangement of shoulder, left  M24 812        Start Time: 915  Stop Time: 1010  Total time in clinic (min): 55 minutes    Subjective: "The pain is alitle better than over the weekend  Since Friday I have been feeling this weird sensation in the front of my shoulder like duct tape is being pulled off  It goes into my L chest  It's not inside; it feels like it's on my skin  I don't have to be doing anything and it happens "      Objective: See treatment diary below      Assessment: Tolerated treatment fair  Pt continues with limited and painful movements  Auditible "crack" noted during PROM with pt's arm supported at 45* ABD during ER stretch  Able to increased motion slightly after, no increase in pain levels  Will continue to monitor and progress accordingly  Patient demonstrated fatigue post treatment and would benefit from continued PT      Plan: Continue per plan of care  Progress treatment as tolerated         Precautions: s/p L RTC repair, NO AROM X 6 weeks       Re-eval Date:     Date     Visit Count 11 12 13 14    FOTO        Pain In 4/10 5/10 6/10 4/10    Pain Out 0/10 5-6 6/10 4/10          Manuals     PROM R shoulder   GENTLE    PROM R shoulder   GENTLE  15' PROM R shoulder   GENTLE as noted above/as tolerated  25' PROM L shoulder supine to tolerance, gentle end range stretching as tolerated,   IR/ER at ~45* abduction in scapular plane with arm supported on pillows  20' total tx  PROM L shoulder supine to tolerance, gentle end range stretching as tolerated,   IR/ER at ~45* abduction in scapular plane with arm supported on pillows  20' total tx     Dressing change                         Neuro Re-Ed                                                                Ther Ex        UT stretch    Levator stretch        C-spine AROM    Elbow AAROM Gentle cane AAROM scaption   Flexion with bend elbow  5x10"        Gentle cane AAROM scaption   Flexion with bend elbow  20x5"  Gentle cane AAROM scaption standing   10x/10"  Flexion supine with cane   10x10"     Standing ER AAROM with cane 10x/10"  Gentle cane AAROM scaption standing   10x/10"  Flexion supine with cane   10x10"    Standing ER AAROM with cane 10x/10"    Wrist AROM 3# 3x10        indv     composite        Blue 2'       Pendulums f/b, s/s  Fwd/lat  2' ea  Finger Ladder AAROM flexion and scaption   10x/10" ea as inder  Finger Ladder AAROM flexion and scaption   10x/10" ea as inder     scap retract Isometrics  Flex/abd/ER/IR  10x10" Isometrics  Flex/abd/ER/IR  10x10" Isometrics  Flex/abd/ER/IR  15x10"ea Isometrics  Flex/abd/ER/IR  15x10"ea    Pulleys scap 2'  Pulleys flexion and scap 3'ea Pulleys flexion and scap 3'ea    Table slides Scap/flex  20x/3-5" Scap/flex  20x/3-5"      Ther Activity                        Gait Training                        Modalities CP 10' no adverse effects CP R shoulder x10'   no adverse effects CP R shoulder x10'   no adverse effects CP R shoulder x10'   no adverse effects

## 2021-07-08 ENCOUNTER — OFFICE VISIT (OUTPATIENT)
Dept: PHYSICAL THERAPY | Facility: CLINIC | Age: 41
End: 2021-07-08
Payer: OTHER MISCELLANEOUS

## 2021-07-08 DIAGNOSIS — M24.812 INTERNAL DERANGEMENT OF SHOULDER, LEFT: Primary | ICD-10-CM

## 2021-07-08 PROCEDURE — 97110 THERAPEUTIC EXERCISES: CPT | Performed by: PHYSICAL MEDICINE & REHABILITATION

## 2021-07-08 PROCEDURE — 97140 MANUAL THERAPY 1/> REGIONS: CPT | Performed by: PHYSICAL MEDICINE & REHABILITATION

## 2021-07-08 NOTE — PROGRESS NOTES
Daily Note     Today's date: 2021  Patient name: Mare Tian  : 1980  MRN: [id number]  Referring provider: Robert Burton  Dx:   Encounter Diagnosis     ICD-10-CM    1  Internal derangement of shoulder, left  M24 812                   Subjective: Pt notes the superficial pain he was having over anterior L shoulder has resolved  Notes his L shoulder pain is "the same" but seems to be not as intense as it was previously  Feels his ROM is slowly improving as well, especially into ER  No new sx/complaints  Notes he has been trying to stretch his arm more at home as well  Objective: See treatment diary below      Assessment: Tolerated treatment well  Cont with constant pain L shoulder, however pt  Notes it is improved slightly overall from prior levels  Pt with less apprehension/guarding with AAROM this date L shoulder with pulleys and finger ladder  Improvement noted in L shoulder active abduction; flexion AROM remains limited to  ~35-40* with c/o pain and stiffness  No cavitation/crepitation noted L GHJ with PROM this date; improvements noted in PROM all planes L shoulder vs Friday's session, especially note improvements into ER PROM with less pain/apprehension and without end feel noted (pt reports feeling a mild stretch)  Stiff end feel into flexion with limited creep; abduction with stiff end feel with creep  No complaints after tx  Improved AAROM ER with cane this date  Reviewed AAROM and self stretching for HEP  Patient demonstrated fatigue post treatment and would benefit from continued PT      Plan: Continue per plan of care  Progress treatment as tolerated         Precautions: s/p L RTC repair, NO AROM X 6 weeks       Re-eval Date:     Date    Visit Count 11 12 13 14 15   FOTO        Pain In 4/10 5/10 6/10 4/10 3/10   Pain Out 0/10 5-6 6/10 4/10 3/10         Manuals    PROM R shoulder   GENTLE    PROM R shoulder   GENTLE  15' PROM R shoulder   GENTLE as noted above/as tolerated  25' PROM L shoulder supine to tolerance, gentle end range stretching as tolerated,   IR/ER at ~45* abduction in scapular plane with arm supported on pillows  20' total tx  PROM L shoulder supine to tolerance, gentle end range stretching as tolerated,   IR/ER at ~45-60* abduction in scapular plane with arm supported on pillows  20' total tx  PROM L shoulder supine to tolerance, gentle end range stretching as tolerated,   IR/ER at ~45* abduction in scapular plane with arm supported on pillows  20' total tx    Dressing change                         Neuro Re-Ed                                                                Ther Ex        UT stretch    Levator stretch        C-spine AROM    Elbow AAROM Gentle cane AAROM scaption   Flexion with bend elbow  5x10"        Gentle cane AAROM scaption   Flexion with bend elbow  20x5"  Gentle cane AAROM scaption standing   10x/10"  Flexion supine with cane   10x10"     Standing ER AAROM with cane 10x/10"  Gentle cane AAROM scaption standing   10x/10"  Flexion supine with cane   10x10"    Standing ER AAROM with cane 10x/10" Gentle cane AAROM scaption standing   10x/10"  Flexion supine with cane   10x10"    Standing ER AAROM with cane 10x/10"   Wrist AROM 3# 3x10        indv     composite        Blue 2'       Pendulums f/b, s/s  Fwd/lat  2' ea  Finger Ladder AAROM flexion and scaption   10x/10" ea as inder  Finger Ladder AAROM flexion and scaption   10x/10" ea as inder  Finger Ladder AAROM flexion and scaption   10x/10" ea as inder    scap retract Isometrics  Flex/abd/ER/IR  10x10" Isometrics  Flex/abd/ER/IR  10x10" Isometrics  Flex/abd/ER/IR  15x10"ea Isometrics  Flex/abd/ER/IR  15x10"ea Isometrics  Flex/abd/ER/IR  15x10"ea   Pulleys scap 2'  Pulleys flexion and scap 3'ea Pulleys flexion and scap 3'ea Pulleys flexion and scap 3'ea   Table slides Scap/flex  20x/3-5" Scap/flex  20x/3-5"      Ther Activity                        Gait Training                        Modalities CP 10' no adverse effects CP R shoulder x10'   no adverse effects CP R shoulder x10'   no adverse effects CP R shoulder x10'   no adverse effects CP R shoulder x10'   no adverse effects

## 2021-07-13 ENCOUNTER — OFFICE VISIT (OUTPATIENT)
Dept: PHYSICAL THERAPY | Facility: CLINIC | Age: 41
End: 2021-07-13
Payer: OTHER MISCELLANEOUS

## 2021-07-13 DIAGNOSIS — M24.812 INTERNAL DERANGEMENT OF SHOULDER, LEFT: Primary | ICD-10-CM

## 2021-07-13 PROCEDURE — 97140 MANUAL THERAPY 1/> REGIONS: CPT | Performed by: PHYSICAL MEDICINE & REHABILITATION

## 2021-07-13 PROCEDURE — 97110 THERAPEUTIC EXERCISES: CPT | Performed by: PHYSICAL MEDICINE & REHABILITATION

## 2021-07-13 NOTE — PROGRESS NOTES
Daily Note     Today's date: 2021  Patient name: Ivania Izquierdo  : 1980  MRN: [id number]  Referring provider: Lacey Helton  Dx:   Encounter Diagnosis     ICD-10-CM    1  Internal derangement of shoulder, left  M24 812                   Subjective: Pt notes no new sx/complaitns  Overall feels "the same" but notes he does feel he is getting better A/AAROM of his shoulder; can "reach further with it"  Notes at home, a plate fell and he quickly reached forward for it with L UE; had immediate sharp pain, but then noted it was OK afterwards  Had some of that superficial burning pain L anterior shoulder last evening "out of no where"; otherwise has not been having that pain  Notes he has been stretching more at home  Objective: See treatment diary below      Assessment: Tolerated treatment fair  Continues to be limited with L shoulder AAROM and PROM with pain and apprehension/guarding  Overall pain levels appear to be gradually reducing  No adverse reaction to tx today  No cavitation/crepitations noted L shoulder with PROM  Improved PROM with forward flexion and abduction; able to progress > 100/110* abduction/flexion with stiff end feel with creep with reduced guarding (chip into flexion)  Continues with limited shoulder ER with apprehension; stiff end feel with guarding; slightly improved from last week  IR progressing with stiff end feel with creep  AROM remains < 90* shoulder elevation with pain and mm weakness; slowly improving  Cont to focus program largely on L shoulder PROM 2* cont'd significant limitations  Reviewed HEP with cont'd focus on PROM/AAROM; understanding noted  Patient demonstrated fatigue post treatment and would benefit from continued PT      Plan: Continue per plan of care  Progress treatment as tolerated         Precautions: s/p L RTC repair, NO AROM X 6 weeks       Re-eval Date:     Date        Visit Count 16       FOTO        Pain In 3/10       Pain Out 5/10 "stiff from the ice"              Manuals 7/13       PROM R shoulder   GENTLE    PROM L shoulder supine to tolerance, gentle end range stretching as tolerated,   IR/ER at ~45* abduction in scapular plane with arm supported on pillows  20' total tx        Dressing change                         Neuro Re-Ed                                                                Ther Ex        UT stretch    Levator stretch        C-spine AROM    Elbow AAROM Gentle cane AAROM scaption standing   10x/10"  Flexion supine with cane   10x10"    Standing ER AAROM with cane 10x/10           Wrist AROM         indv     composite             Pendulums f/b, s/s  Finger Ladder AAROM flexion and scaption   10x/10" ea as inder        scap retract Isometrics  Flex/abd/ER/IR  15x10"       Pulleys Pulleys flexion and scap 3'ea       Table slides Reviewed HEP      Gentle self caudal glide          Ther Activity                        Gait Training                        Modalities CP 10' no adverse effects CP 10' L shoulder  no adverse effects

## 2021-07-15 ENCOUNTER — OFFICE VISIT (OUTPATIENT)
Dept: PHYSICAL THERAPY | Facility: CLINIC | Age: 41
End: 2021-07-15
Payer: OTHER MISCELLANEOUS

## 2021-07-15 DIAGNOSIS — M24.812 INTERNAL DERANGEMENT OF SHOULDER, LEFT: Primary | ICD-10-CM

## 2021-07-15 PROCEDURE — 97110 THERAPEUTIC EXERCISES: CPT

## 2021-07-15 PROCEDURE — 97140 MANUAL THERAPY 1/> REGIONS: CPT

## 2021-07-15 NOTE — PROGRESS NOTES
Daily Note     Today's date: 7/15/2021  Patient name: Yuliet Sexton  : 1980  MRN: [id number]  Referring provider: Wally Kim  Dx:   Encounter Diagnosis     ICD-10-CM    1  Internal derangement of shoulder, left  M24 812        Start Time: 830  Stop Time: 925  Total time in clinic (min): 55 minutes    Subjective: "I feel like I am making so improvements "      Objective: See treatment diary below      Assessment: Tolerated treatment fair  Pt continues with limited AROM with available end range pain  Two "cracks" noted during PROM with shoulder on pillow in scaption and shoulder abd ~45*; pain noted, no increase than usual   Slow improvement noted with AROM  Will continue to progress as able  Patient demonstrated fatigue post treatment and would benefit from continued PT      Plan: Continue per plan of care  Progress treatment as tolerated         Precautions: s/p L RTC repair, NO AROM X 6 weeks       Re-eval Date:     Date 7/13 7/15      Visit Count 16 17      FOTO  7/15      Pain In 3/10 3-4/10      Pain Out 5/10 "stiff from the ice"  /10            Manuals 7/13 7/15      PROM R shoulder   GENTLE    PROM L shoulder supine to tolerance, gentle end range stretching as tolerated,   IR/ER at ~45* abduction in scapular plane with arm supported on pillows  20' total tx  PROM L shoulder supine to tolerance, gentle end range stretching as tolerated,   IR/ER at ~45* abduction in scapular plane with arm supported on pillows  20' total tx       Dressing change                         Neuro Re-Ed                                                                Ther Ex        UT stretch    Levator stretch        C-spine AROM    Elbow AAROM Gentle cane AAROM scaption standing   10x/10"  Flexion supine with cane   10x10"    Standing ER AAROM with cane 10x/10     Gentle cane AAROM scaption standing   10x/10"  Flexion supine with cane   10x10"    Standing ER AAROM with cane 10x/10      Wrist AROM  indv     composite             Pendulums f/b, s/s  Finger Ladder AAROM flexion and scaption   10x/10" ea as inder  Finger Ladder AAROM flexion and scaption   10x/10" ea as inder       scap retract Isometrics  Flex/abd/ER/IR  15x10" Isometrics  Flex/abd/ER/IR  15x10"      Pulleys Pulleys flexion and scap 3'ea Pulleys flexion and scap 3'ea      Table slides Reviewed HEP      Gentle self caudal glide          Ther Activity                        Gait Training                        Modalities CP 10' no adverse effects CP 10' L shoulder  no adverse effects

## 2021-07-20 ENCOUNTER — OFFICE VISIT (OUTPATIENT)
Dept: PHYSICAL THERAPY | Facility: CLINIC | Age: 41
End: 2021-07-20
Payer: OTHER MISCELLANEOUS

## 2021-07-20 DIAGNOSIS — M24.812 INTERNAL DERANGEMENT OF SHOULDER, LEFT: Primary | ICD-10-CM

## 2021-07-20 PROCEDURE — 97140 MANUAL THERAPY 1/> REGIONS: CPT

## 2021-07-20 PROCEDURE — 97110 THERAPEUTIC EXERCISES: CPT

## 2021-07-20 NOTE — PROGRESS NOTES
Daily Note     Today's date: 2021  Patient name: Gricelda Mclean  : 1980  MRN: [id number]  Referring provider: Grant Fernandez  Dx:   Encounter Diagnosis     ICD-10-CM    1  Internal derangement of shoulder, left  M24 812        Start Time: 915  Stop Time:   Total time in clinic (min): 55 minutes    Subjective: "My shoulder is moving a little better but I still have pain  It still catches when I bring my arm back down like it did before surgery "      Objective: See treatment diary below      Assessment: Tolerated treatment well  Pt able to progress with tband resistance and added prone exercises  Pt continues to strength deficits and limited AROM  PROM gains noted in all directions with end range pain  Will continue to progress as able  Patient demonstrated fatigue post treatment and would benefit from continued PT      Plan: Continue per plan of care  Progress treatment as tolerated         Precautions: s/p L RTC repair, NO AROM X 6 weeks       Re-eval Date:     Date 7/13 7/15 7/20     Visit Count 16 17 18     FOTO  7/15      Pain In 3/10 3-/10 3-4     Pain Out 5/10 "stiff from the ice"  5/10 3-4           Manuals 7/13 7/15 7/20     PROM R shoulder   GENTLE    PROM L shoulder supine to tolerance, gentle end range stretching as tolerated,   IR/ER at ~45* abduction in scapular plane with arm supported on pillows  20' total tx  PROM L shoulder supine to tolerance, gentle end range stretching as tolerated,   IR/ER at ~45* abduction in scapular plane with arm supported on pillows  20' total tx  PROM L shoulder supine to tolerance, gentle end range stretching as tolerated,   IR/ER at ~45* abduction in scapular plane with arm supported on pillows  20' total tx      Dressing change                         Neuro Re-Ed                                                                Ther Ex        UT stretch    Levator stretch        C-spine AROM    Elbow AAROM Gentle cane AAROM scaption standing   10x/10"  Flexion supine with cane   10x10"    Standing ER AAROM with cane 10x/10     Gentle cane AAROM scaption standing   10x/10"  Flexion supine with cane   10x10"    Standing ER AAROM with cane 10x/10 Gentle cane AAROM scaption standing   10x/10"  Flexion supine with cane   10x10"    Standing ER AAROM with cane 10x/10     Wrist AROM         indv     composite             Pendulums f/b, s/s  Finger Ladder AAROM flexion and scaption   10x/10" ea as inder  Finger Ladder AAROM flexion and scaption   10x/10" ea as inder  Finger Ladder AAROM flexion and scaption   10x/10" ea as inder      scap retract Isometrics  Flex/abd/ER/IR  15x10" Isometrics  Flex/abd/ER/IR  15x10" tband IR/ER walk out  10x10"  grn     Pulleys Pulleys flexion and scap 3'ea Pulleys flexion and scap 3'ea Pulleys flexion and scap 3'ea     Table slides Reviewed HEP      Gentle self caudal glide     ER 2x10/3-5"    Prone rows    Prone ext scap     Ther Activity                        Gait Training                        Modalities CP 10' no adverse effects CP 10' L shoulder  no adverse effects CP 10' L shoulder  no adverse effects

## 2021-07-22 ENCOUNTER — OFFICE VISIT (OUTPATIENT)
Dept: PHYSICAL THERAPY | Facility: CLINIC | Age: 41
End: 2021-07-22
Payer: OTHER MISCELLANEOUS

## 2021-07-22 DIAGNOSIS — M24.812 INTERNAL DERANGEMENT OF SHOULDER, LEFT: Primary | ICD-10-CM

## 2021-07-22 PROCEDURE — 97110 THERAPEUTIC EXERCISES: CPT

## 2021-07-22 PROCEDURE — 97140 MANUAL THERAPY 1/> REGIONS: CPT

## 2021-07-22 NOTE — PROGRESS NOTES
Daily Note     Today's date: 2021  Patient name: Jd Hernández  : 1980  MRN: [id number]  Referring provider: Contreras Osorio  Dx:   Encounter Diagnosis     ICD-10-CM    1  Internal derangement of shoulder, left  M24 812        Start Time: 745  Stop Time: 840  Total time in clinic (min): 55 minutes    Subjective: "My shoulder doesn't feel that bad today "      Objective: See treatment diary below      Assessment: Tolerated treatment well  Added UBE with slow revolutions; able to complete  Motion is slowly improving passively with end range pain  Pt continues to be apprehensive to actively move arm  Will continue to progress as able to return to PLOF  Patient demonstrated fatigue post treatment and would benefit from continued PT      Plan: Continue per plan of care  Progress treatment as tolerated         Precautions: s/p L RTC repair, NO AROM X 6 weeks       Re-eval Date:     Date 7/13 7/15 7/20 7/22    Visit Count 16 17 18 19    FOTO  7/15      Pain In 3/10 3-4/10 3-4 3/10    Pain Out 5/10 "stiff from the ice"  5/10 3-4 3-4          Manuals 7/13 7/15 7/20 7/22    PROM R shoulder   GENTLE    PROM L shoulder supine to tolerance, gentle end range stretching as tolerated,   IR/ER at ~45* abduction in scapular plane with arm supported on pillows  20' total tx  PROM L shoulder supine to tolerance, gentle end range stretching as tolerated,   IR/ER at ~45* abduction in scapular plane with arm supported on pillows  20' total tx  PROM L shoulder supine to tolerance, gentle end range stretching as tolerated,   IR/ER at ~45* abduction in scapular plane with arm supported on pillows  20' total tx  PROM L shoulder supine to tolerance, gentle end range stretching as tolerated,   IR/ER at ~45* abduction in scapular plane with arm supported on pillows  20' total tx     Dressing change                         Neuro Re-Ed                                                                Ther Ex        UT stretch    Levator stretch        C-spine AROM    Elbow AAROM Gentle cane AAROM scaption standing   10x/10"  Flexion supine with cane   10x10"    Standing ER AAROM with cane 10x/10     Gentle cane AAROM scaption standing   10x/10"  Flexion supine with cane   10x10"    Standing ER AAROM with cane 10x/10 Gentle cane AAROM scaption standing   10x/10"  Flexion supine with cane   10x10"    Standing ER AAROM with cane 10x/10 Gentle cane AAROM scaption standing   10x/10"  Flexion supine with cane   10x10"    Standing ER AAROM with cane 10x/10    Wrist AROM     RPM 10' alt     indv     composite             Pendulums f/b, s/s  Finger Ladder AAROM flexion and scaption   10x/10" ea as inder  Finger Ladder AAROM flexion and scaption   10x/10" ea as inder  Finger Ladder AAROM flexion and scaption   10x/10" ea as inder  Finger Ladder AAROM flexion and scaption   10x/10" ea as inder     scap retract Isometrics  Flex/abd/ER/IR  15x10" Isometrics  Flex/abd/ER/IR  15x10" tband IR/ER walk out  10x10"  grn tband IR/ER walk out  10x10"  grn    Pulleys Pulleys flexion and scap 3'ea Pulleys flexion and scap 3'ea Pulleys flexion and scap 3'ea Pulleys flexion and scap 3'ea    Table slides Reviewed HEP      Gentle self caudal glide     ER 2x10/3-5"    Prone rows    Prone ext scap ER 2x10/3-5"    Prone rows  2x10    Prone ext   2x10/3-5"      Ther Activity                        Gait Training                        Modalities CP 10' no adverse effects CP 10' L shoulder  no adverse effects CP 10' L shoulder  no adverse effects CP 10' L shoulder  no adverse effects

## 2021-07-27 ENCOUNTER — OFFICE VISIT (OUTPATIENT)
Dept: PHYSICAL THERAPY | Facility: CLINIC | Age: 41
End: 2021-07-27
Payer: OTHER MISCELLANEOUS

## 2021-07-27 DIAGNOSIS — M24.812 INTERNAL DERANGEMENT OF SHOULDER, LEFT: Primary | ICD-10-CM

## 2021-07-27 PROCEDURE — 97110 THERAPEUTIC EXERCISES: CPT

## 2021-07-27 PROCEDURE — 97140 MANUAL THERAPY 1/> REGIONS: CPT

## 2021-07-27 NOTE — PROGRESS NOTES
Daily Note     Today's date: 2021  Patient name: Juana Nam  : 1980  MRN: [id number]  Referring provider: Liliana Wade  Dx:   Encounter Diagnosis     ICD-10-CM    1  Internal derangement of shoulder, left  M24 812        Start Time: 830  Stop Time: 925  Total time in clinic (min): 55 minutes    Subjective: "My shoulder feels pretty good today "      Objective: See treatment diary below      Assessment: Tolerated treatment fair  Pt continues with limited AAROM in all planes with pain  Pt noted increased 'clicking' with all movements, no audible noted  Limited PROM noted in all planes with end range pain, no significant gains note  Pt apprehensive with all shoulder movements, active and passive  Will continue to progress as able to regain motion to be able to start strengthening  Patient demonstrated fatigue post treatment and would benefit from continued PT      Plan: Continue per plan of care  Progress treatment as tolerated         Precautions: s/p L RTC repair, NO AROM X 6 weeks       Re-eval Date:     Date 7/13 7/15 7/20 7/22 7/27   Visit Count 16 17 18 19 20   FOTO  7/15      Pain In 3/10 3-4/10 3-4 3/10 3/10   Pain Out /10 "stiff from the ice"  5/10 3-4 3-4 10         Manuals 7/13 7/15 7/20 7/22 7/27   PROM R shoulder   GENTLE    PROM L shoulder supine to tolerance, gentle end range stretching as tolerated,   IR/ER at ~45* abduction in scapular plane with arm supported on pillows  20' total tx  PROM L shoulder supine to tolerance, gentle end range stretching as tolerated,   IR/ER at ~45* abduction in scapular plane with arm supported on pillows  20' total tx  PROM L shoulder supine to tolerance, gentle end range stretching as tolerated,   IR/ER at ~45* abduction in scapular plane with arm supported on pillows  20' total tx  PROM L shoulder supine to tolerance, gentle end range stretching as tolerated,   IR/ER at ~45* abduction in scapular plane with arm supported on pillows  20' total tx  PROM L shoulder supine to tolerance, gentle end range stretching as tolerated,   IR/ER at ~45* abduction in scapular plane with arm supported on pillows  20' total tx    Dressing change                         Neuro Re-Ed                                                                Ther Ex        UT stretch    Levator stretch        C-spine AROM    Elbow AAROM Gentle cane AAROM scaption standing   10x/10"  Flexion supine with cane   10x10"    Standing ER AAROM with cane 10x/10     Gentle cane AAROM scaption standing   10x/10"  Flexion supine with cane   10x10"    Standing ER AAROM with cane 10x/10 Gentle cane AAROM scaption standing   10x/10"  Flexion supine with cane   10x10"    Standing ER AAROM with cane 10x/10 Gentle cane AAROM scaption standing   10x/10"  Flexion supine with cane   10x10"    Standing ER AAROM with cane 10x/10 Gentle cane AAROM scaption standing   10x/10"  Flexion supine with cane   10x10"    Standing ER AAROM with cane 10x/10   Wrist AROM     RPM 10' alt  RPM 10' alt    indv     composite          SL ER  2x10/3-5"  SL flex  2x10/3-5"   Pendulums f/b, s/s  Finger Ladder AAROM flexion and scaption   10x/10" ea as inder  Finger Ladder AAROM flexion and scaption   10x/10" ea as inder  Finger Ladder AAROM flexion and scaption   10x/10" ea as inder  Finger Ladder AAROM flexion and scaption   10x/10" ea as inder  Finger Ladder AAROM flexion and scaption   10x/10" ea as inder    scap retract Isometrics  Flex/abd/ER/IR  15x10" Isometrics  Flex/abd/ER/IR  15x10" tband IR/ER walk out  10x10"  grn tband IR/ER walk out  10x10"  grn tband IR/ER walk out  10x10"  grn   Pulleys Pulleys flexion and scap 3'ea Pulleys flexion and scap 3'ea Pulleys flexion and scap 3'ea Pulleys flexion and scap 3'ea Pulleys flexion and scap 3'ea   Table slides Reviewed HEP      Gentle self caudal glide     ER 2x10/3-5"    Prone rows    Prone ext scap ER 2x10/3-5"    Prone rows  2x10    Prone ext   2x10/3-5"       Prone rows  2x10    Prone ext   2x10/3-5"   Ther Activity                        Gait Training                        Modalities CP 10' no adverse effects CP 10' L shoulder  no adverse effects CP 10' L shoulder  no adverse effects CP 10' L shoulder  no adverse effects CP 10' L shoulder  no adverse effects

## 2021-07-29 ENCOUNTER — OFFICE VISIT (OUTPATIENT)
Dept: PHYSICAL THERAPY | Facility: CLINIC | Age: 41
End: 2021-07-29
Payer: OTHER MISCELLANEOUS

## 2021-07-29 DIAGNOSIS — M24.812 INTERNAL DERANGEMENT OF SHOULDER, LEFT: Primary | ICD-10-CM

## 2021-07-29 PROCEDURE — 97140 MANUAL THERAPY 1/> REGIONS: CPT

## 2021-07-29 PROCEDURE — 97110 THERAPEUTIC EXERCISES: CPT

## 2021-07-29 NOTE — PROGRESS NOTES
Daily Note     Today's date: 2021  Patient name: Shravan Dial  : 1980  MRN: [id number]  Referring provider: Amelie Caraballo  Dx:   Encounter Diagnosis     ICD-10-CM    1  Internal derangement of shoulder, left  M24 812        Start Time: 0900  Stop Time: 1005  Total time in clinic (min): 65 minutes    Subjective: "I think it's getting better  I can move it a little better but it still hurts "      Objective: See treatment diary below      Assessment: Tolerated treatment fair  Pt continues with limited active ROM in all directions <90* in flexion and abduction with strength deficits  Slight improvement noted passively with pain  Pt continues with apprehension to move arm in all direction  Encouraged continued use of shoulder and stretching over the weekend to increase mobility and strength of shoulder  Will progress as able  Patient demonstrated fatigue post treatment and would benefit from continued PT      Plan: Continue per plan of care  Progress treatment as tolerated         Precautions: s/p L RTC repair, NO AROM X 6 weeks       Re-eval Date:     Date        Visit Count 21       FOTO        Pain In 3/10       Pain Out 5/10 "stiff from the ice"              Manuals        PROM R shoulder   GENTLE    PROM L shoulder supine to tolerance, gentle end range stretching as tolerated,   IR/ER at ~45* abduction in scapular plane with arm supported on pillows  20' total tx        Dressing change                         Neuro Re-Ed                                                                Ther Ex        UT stretch    Levator stretch        C-spine AROM    Elbow AAROM Gentle cane AAROM scaption standing   10x/10"  Flexion supine with cane   10x10"    Standing ER AAROM with cane 10x/10           Wrist AROM  RPM 10' alt        indv     composite  SL ER  2x10/3-5"  SL flex  2x10/3-5"           Pendulums f/b, s/s  Finger Ladder AAROM flexion and scaption   10x/10" ea as inder        scap retract tband IR/ER walk out  10x10"  grn       Pulleys Pulleys flexion and scap 3'ea       Table slides Prone rows  2x10    Prone ext   2x10/3-5"       Ther Activity                        Gait Training                        Modalities CP 10' no adverse effects CP 10' L shoulder  no adverse effects CP 10' L shoulder  no adverse effects CP 10' L shoulder  no adverse effects CP 10' L shoulder  no adverse effects

## 2021-08-03 ENCOUNTER — OFFICE VISIT (OUTPATIENT)
Dept: PHYSICAL THERAPY | Facility: CLINIC | Age: 41
End: 2021-08-03
Payer: OTHER MISCELLANEOUS

## 2021-08-03 DIAGNOSIS — M24.812 INTERNAL DERANGEMENT OF SHOULDER, LEFT: Primary | ICD-10-CM

## 2021-08-03 PROCEDURE — 97110 THERAPEUTIC EXERCISES: CPT | Performed by: PHYSICAL MEDICINE & REHABILITATION

## 2021-08-03 PROCEDURE — 97140 MANUAL THERAPY 1/> REGIONS: CPT | Performed by: PHYSICAL MEDICINE & REHABILITATION

## 2021-08-03 NOTE — PROGRESS NOTES
Daily Note     Today's date: 8/3/2021  Patient name: Gautam Rosa  : 1980  MRN: [id number]  Referring provider: Darrius Johnson  Dx:   Encounter Diagnosis     ICD-10-CM    1  Internal derangement of shoulder, left  M24 812                   Subjective: Pt notes he "did something" to his shoulder Friday at home  Notes he was picking up clothing from a laundry basket when he felt a sudden burning pain L shoulder joint; has not felt this before  The pain lasted  Notes it has improved, but is still there and "lucia feels a little different now" vs the aching pain he usually had  Denies any other sx/complaints; no loss of motion per pt  Objective: See treatment diary below      Assessment: Tolerated treatment fair  Session was limited today due to pt's recent flare up of pain L shoulder  Reduced AAROM noted L shoulder with pulleys and finger ladder; continues with apprehension and guarded movement all planes L shoulder with limited AAROM noted   Continues with AROM L shoulder <90* elevation with c/o pain and increased pt apprehension  PROM noted to be less than last visit with flexion /abduction ~100-110* with stiffness and pain at end range; ER remains most limited to about 35/40* at 45 * abduction with stiffness and pain at end range; IR ~ 40* with stiffness only at end range at 45* abduction  Pt remains apprehensive and guarded with PROM; limited ability to progress past end ranges  HEP was again reviewed with emphasis on AAROM and shoulder mobility exercises to increase ROM; understanding and compliance reported  Patient demonstrated fatigue post treatment and would benefit from continued PT      Plan: Continue per plan of care  Progress treatment as tolerated         Precautions: s/p L RTC repair, NO AROM X 6 weeks       Re-eval Date:     Date 7/29 8/3      Visit Count 21 22      FOTO        Pain In 3/10 5/10      Pain Out 5/10 "stiff from the ice"  5/10            Manuals 7/29 8/3 PROM R shoulder   GENTLE    PROM L shoulder supine to tolerance, gentle end range stretching as tolerated,   IR/ER at ~45* abduction in scapular plane with arm supported on pillows  20' total tx  PROM L shoulder supine to tolerance, gentle end range stretching as tolerated,   IR/ER at ~45* abduction in scapular plane with arm supported on pillows  25' total tx       Dressing change                         Neuro Re-Ed                                                                Ther Ex        UT stretch    Levator stretch        C-spine AROM    Elbow AAROM Gentle cane AAROM scaption standing   10x/10"  Flexion supine with cane   10x10"    Standing ER AAROM with cane 10x/10     Gentle cane AAROM scaption standing   10x/10"  Flexion supine with cane   10x10"    Standing ER AAROM with cane 10x/10      Wrist AROM  RPM 10' alt        indv     composite  SL ER  2x10/3-5"  SL flex  2x10/3-5"           Pendulums f/b, s/s  Finger Ladder AAROM flexion and scaption   10x/10" ea as inder  Finger Ladder AAROM flexion and scaption   10x/10" ea as inder       scap retract tband IR/ER walk out  10x10"  grn       Pulleys Pulleys flexion and scap 3'ea Pulleys flexion and scap 3'ea      Table slides Prone rows  2x10    Prone ext   2x10/3-5"       Ther Activity                        Gait Training                        Modalities CP 10' no adverse effects CP 10' L shoulder  no adverse effects

## 2021-08-05 ENCOUNTER — OFFICE VISIT (OUTPATIENT)
Dept: PHYSICAL THERAPY | Facility: CLINIC | Age: 41
End: 2021-08-05
Payer: OTHER MISCELLANEOUS

## 2021-08-05 DIAGNOSIS — M24.812 INTERNAL DERANGEMENT OF SHOULDER, LEFT: Primary | ICD-10-CM

## 2021-08-05 PROCEDURE — 97110 THERAPEUTIC EXERCISES: CPT

## 2021-08-05 PROCEDURE — 97140 MANUAL THERAPY 1/> REGIONS: CPT

## 2021-08-05 NOTE — PROGRESS NOTES
Daily Note     Today's date: 2021  Patient name: Adela Taylor  : 1980  MRN: [id number]  Referring provider: Mary Jerome  Dx:   Encounter Diagnosis     ICD-10-CM    1  Internal derangement of shoulder, left  M24 812        Start Time: 830  Stop Time: 925  Total time in clinic (min): 55 minutes    Subjective: "My shoulder is still achy and sore like it usually is but the burning has subsided "      Objective: See treatment diary below      Assessment: Tolerated treatment fair  Pt continues with limited painful AAROM on pulleys  Pt cautious to move through discomfort actively  Increased PROM in all in all planes with pain and regarding restrictions by pt  Added SL active strengthening with challenged  Pt noted increased pain at end of session  Will continue to progress to increase strength and motion to return to PLOF  Patient demonstrated fatigue post treatment and would benefit from continued PT      Plan: Continue per plan of care  Progress treatment as tolerated         Precautions: s/p L RTC repair, NO AROM X 6 weeks       Re-eval Date:     Date 7/29 8/3 8/5     Visit Count 21 22 23     FOTO        Pain In 3/10 5/10 2-3/10     Pain Out 5/10 "stiff from the ice"  5/10 5/10           Manuals 7/29 8/3 8/5     PROM R shoulder   GENTLE    PROM L shoulder supine to tolerance, gentle end range stretching as tolerated,   IR/ER at ~45* abduction in scapular plane with arm supported on pillows  20' total tx  PROM L shoulder supine to tolerance, gentle end range stretching as tolerated,   IR/ER at ~45* abduction in scapular plane with arm supported on pillows  25' total tx  PROM L shoulder supine to tolerance, gentle end range stretching as tolerated,   IR/ER at ~45* abduction in scapular plane with arm supported on pillows  25' total tx      Dressing change                         Neuro Re-Ed                                                                Ther Ex        UT stretch    Levator stretch        C-spine AROM    Elbow AAROM Gentle cane AAROM scaption standing   10x/10"  Flexion supine with cane   10x10"    Standing ER AAROM with cane 10x/10     Gentle cane AAROM scaption standing   10x/10"  Flexion supine with cane   10x10"    Standing ER AAROM with cane 10x/10 Gentle cane AAROM scaption standing   10x/10"  Flexion supine with cane   10x10"    Standing ER AAROM with cane 10x/10     Wrist AROM  RPM 10' alt        indv     composite  SL ER  2x10/3-5"  SL flex  2x10/3-5"      SL ER  2x10/3-5"  SL flex  2x10/3-5"     Pendulums f/b, s/s  Finger Ladder AAROM flexion and scaption   10x/10" ea as inder  Finger Ladder AAROM flexion and scaption   10x/10" ea as inder  Finger Ladder AAROM flexion and scaption   10x/10" ea as inder      scap retract tband IR/ER walk out  10x10"  grn  tband IR/ER walk out  10x10"  grn     Pulleys Pulleys flexion and scap 3'ea Pulleys flexion and scap 3'ea Pulleys flexion and scap 3'ea     Table slides Prone rows  2x10    Prone ext   2x10/3-5"       Ther Activity                        Gait Training                        Modalities CP 10' no adverse effects CP 10' L shoulder  no adverse effects CP 10' L shoulder  no adverse effects

## 2021-08-11 ENCOUNTER — EVALUATION (OUTPATIENT)
Dept: PHYSICAL THERAPY | Facility: CLINIC | Age: 41
End: 2021-08-11
Payer: OTHER MISCELLANEOUS

## 2021-08-11 DIAGNOSIS — M24.812 INTERNAL DERANGEMENT OF SHOULDER, LEFT: Primary | ICD-10-CM

## 2021-08-11 PROCEDURE — 97110 THERAPEUTIC EXERCISES: CPT | Performed by: PHYSICAL MEDICINE & REHABILITATION

## 2021-08-11 PROCEDURE — 97140 MANUAL THERAPY 1/> REGIONS: CPT | Performed by: PHYSICAL MEDICINE & REHABILITATION

## 2021-08-11 NOTE — PROGRESS NOTES
PT Re-Evaluation     Today's date: 2021  Patient name: Epifanio Copeland  : 1980   MRN: [id number]  Referring provider: Yeimy Yao  Dx:   Encounter Diagnosis     ICD-10-CM    1  Internal derangement of shoulder, left  M24 812                   Assessment  Assessment details: Epifanio Copeland is a 36 y o  male presenting to outpatient physical therapy with diagnosis of Internal derangement of shoulder, left  (primary encounter diagnosis)  He is s/p L shoulder arthroscopy with L RTC repair on 21  Harvey Lazo has been compliant with attending PT (24 total sessions to date) and notes compliance with completing home exercise program since initial eval and most recent re-eval  To this day, his progress has continued to be limited 1* by pain L shoulder and increased pt apprehension and guarding with all active and passive motions L shoulder  He continues with stiffness at all end ranges PROM L shoulder with continued limited tolerance for end range stretching; his PROM has demonstrated significant improvement from last re-eval (except ER), however is not back to normal limits at this time  ER remains most limited with pain and apprehension  His A/AAROM remains <90* elevation with c/o pain and "catching" in L GHJ with eccentric lowering from elevation, which he noted prior to this surgery as well  We have had limited ability to progress to full AROM and strengthening exercises due to cont'd pain and lack of full PROM and AROM with cont'd elevated pain levels with activity  Subsequently, he continues with weakness L shoulder  1-2 weeks ago, pt reported sudden onset of a new burning pain L shoulder after lifting clothes out of laundry basket with L UE; lingered for a few days with a mild relapse in progress/measurements in PT; appears to be back to baseline at this time; will monitor  Overall pt is progressing with PT to date, albeit slower progress   Due to noted impairments, pt continues with significant functional limitations including inability to use L arm with ADLs, inability to lift, difficulty bathing/dressing, interrupted sleep, and inability to RTW  Rc Arango continues with above listed impairments and would benefit from additional skilled PT to address these deficits to return to prior level of function  Impairments: abnormal muscle firing, abnormal muscle tone, abnormal or restricted ROM, activity intolerance, impaired physical strength, lacks appropriate home exercise program and pain with function    Symptom irritability: moderateUnderstanding of Dx/Px/POC: good   Prognosis: fair    Goals  STGs to be achieved in 4-6 weeks:  1  Pt to demonstrate reduced subjective pain rating "at worst" by at least 2-3 points from Initial Eval in order to allow for reduced pain with ADLs and improved functional activity tolerance  - progressing  2  Pt to demonstrate increased strength of L elbow/wrist/hand to at least 4-4+/5 to improve functional independence with ADLs  - progressing   3  Pt to demonstrate grossly WFL PROM L shoulder without increase in pain/sx as allowable per protocol restrictions   - not met, continue  , progressing - Increase L shoulder elevation PROM to at least 150* by next RE-eval    4  Pt will increase L shoulder AROM elevation to at least 110* without increased pain or compensatory strategies  - progressing     LTGs to be achieved in 14-16 weeks:  1  Pt will be I with HEP in order to continue to improve quality of life and independence and reduce risk for re-injury  - progressing   2  Pt to demonstrate return to ADLs and work without limitations or restrictions  - not met   3  Pt to demonstrate improved function as noted by achieving or exceeding predicted score on FOTO outcomes assessment tool   - not met       Plan  Patient would benefit from: skilled physical therapy  Referral necessary: No  Planned modality interventions: cryotherapy and thermotherapy: hydrocollator packs  Planned therapy interventions: joint mobilization, manual therapy, neuromuscular re-education, patient education, postural training, self care, strengthening, stretching, therapeutic activities, therapeutic exercise and home exercise program  Frequency: 2x week  Duration in visits: 12  Duration in weeks: 6  Plan of Care beginning date: 2021  Plan of Care expiration date: 2021  Treatment plan discussed with: patient        Subjective Evaluation    History of Present Illness  Mechanism of injury: surgery  Mechanism of injury: Pt notes he believes he is making improvements  Notes his arm/shoulder is "still achy all the time"  The pain is overall no better/worse as of late, but better than SOC  Some moments it hurts more than others in L shoulder  Pain is worse with bumping his arm off something, moving arm too quickly, and with reaching arm away from his body  Discomfort L shoulder with light lifting  Notes cont'd catching with pain L shoulder with lowering L arm from elevation; this is similar in sensation to sx he had prior to surgery as well  Feels improvements in his L shoulder ROM  Feels gradual improvements in shoulder strength as well  Using L shoulder/arm more with ADLs  No new sx/complaints  Cont'd limited use of L arm with daily activity  Continues with difficulty with driving with pain with L arm "hanging and bouncing" at his side  Unable to perform housework  Uses R arm/hand with all daily activities  RTD Tuesday     Quality of life: good    Pain  Current pain ratin  At best pain ratin (sometimes at rest- rare)  At worst pain ratin  Location: L shoulder  Quality: dull ache, sharp, radiating and tight (catching)  Relieving factors: rest, support, medications and ice  Progression: improved    Social Support  Steps to enter house: yes  2  Stairs in house: no   Lives in: [facility]  Lives with: spouse    Employment status: not working ([employer])  Hand dominance: right    Treatments  Previous treatment: physical therapy, injection treatment and medication  Current treatment: medication and physical therapy  Current treatment comments: s/p RTC repair  Patient Goals  Patient goals for therapy: decreased pain, increased motion, increased strength, independence with ADLs/IADLs, return to sport/leisure activities and return to work          Objective     Postural Observations  Seated posture: fair  Standing posture: fair    Additional Postural Observation Details  Forward head/rounded shoulders  Increased thoracic kyphosis   B scapular depression and protraction with mild winging     L scapular depression and winging mildly more noticeable L vs R - improving/more symmetrical           Observations   Left Shoulder   Positive for incision  Negative for edema  Additional Observation Details    "Cyst" noted L superior medial shoulder/UT region; pt notes this is chronic   Incisions healed/closed L shoulder         Tenderness     Additional Tenderness Details  Diffuse TTP about L shoulder 2* post op status   Continues with diffuse ttp about L anterior shoulder region   Will monitor     Cervical/Thoracic Screen   Cervical range of motion within normal limits with the following exceptions: Grossly WFL without pain/sx  Mild tightness L UT      Neurological Testing     Sensation     Shoulder   Left Shoulder   Intact: light touch    Right Shoulder   Intact: light touch    Additional Neurological Details  Sensation intact to light touch except L thumb with cont'd n/t- not noted at RE   Will monitor     Active Range of Motion   Left Shoulder   Flexion: 80 degrees with pain  Abduction: 90 degrees with pain  External rotation 0°: Left shoulder active external rotation at 0 degrees: ~15-20* beyond neutral  with pain  Internal rotation 0°: Left shoulder active internal rotation at 0 degrees: to body       Additional Active Range of Motion Details  Pt notes he has been having some R shoulder pain- no trauma - continues     Pain and apprehension with all L shoulder AROM   Mild scapular elevation with L shoulder elevation; improved with cues/feedback to correct - improving overall     Stiffness and pain with L shoulder ER AROM with arm at side per pt; feels a hard block "like it just wont go any further than this and it hurts" ; gradually improving    L elbow AROM flex/extension WNL without pain/sx     L wrist/hand AROM WNL     AAROM = to or less than AROM with shoulder elevation with pain and c/o "catching" L shoulder with lowering L arm       Passive Range of Motion   Left Shoulder   Flexion: 130 degrees with pain  Abduction: 138 degrees with pain  External rotation 45°: 40 degrees with pain  Internal rotation 45°: 68 degrees     Additional Passive Range of Motion Details  Pt continues with  limited L shoulder PROM all planes with c/o pain and mm tightness; greatest limitation into ER with mild improvements; flexion, abduction, and IR steadily improving/slowly improving   Stiff end feels all planes with limited pt tolerance for end range stretching- improving tolerance as of late ; especially stiff into ER with mm guarding and apprehension; Stiffness end range flexion and abduction as well with pain however end range is increasing each session gradually ; IR not limited by pain with stiff end feel with creep     L elbow AROM/PROM WNL         Joint Play     Additional Joint Play Details  Gross hypomobility L GHJ as noted above     Strength/Myotome Testing     Left Shoulder     Planes of Motion   Flexion: 3-   Abduction: 3-   External rotation at 0°: 3-   Internal rotation at 0°: 3-     Isolated Muscles   Biceps: 4+   Upper trapezius: 4     Additional Strength Details  L shoulder grossly 3-/5 2* lack of AROM     L wrist grossly 4-4+/5  L hand grossly 4+/5    L elbow 4+/5 flexion without pain/sx; extension 4/5 with shoulder pain                Precautions: s/p L RTC repair, NO AROM X 6 weeks       Re-eval Date: 9/22    Date 7/29 8/3 8/5 8/11 Visit Count 21 22 23 24    FOTO     *   Pain In 3/10 5/10 2-3/10 2-3/10    Pain Out 5/10 "stiff from the ice"  5/10 5/10 5/10          Manuals 7/29 8/3 8/5 8/11    PROM R shoulder   GENTLE    PROM L shoulder supine to tolerance, gentle end range stretching as tolerated,   IR/ER at ~45* abduction in scapular plane with arm supported on pillows  20' total tx  PROM L shoulder supine to tolerance, gentle end range stretching as tolerated,   IR/ER at ~45* abduction in scapular plane with arm supported on pillows  25' total tx  PROM L shoulder supine to tolerance, gentle end range stretching as tolerated,   IR/ER at ~45* abduction in scapular plane with arm supported on pillows  25' total tx  PROM L shoulder supine to tolerance,  end range stretching as tolerated,   IR/ER at ~45* abduction in scapular plane, sustained stretching at end range with gentle overpressure to tolerance   15' total tx     Dressing change                         Neuro Re-Ed                                                                Ther Ex        UT stretch    Levator stretch        C-spine AROM    Elbow AAROM Gentle cane AAROM scaption standing   10x/10"  Flexion supine with cane   10x10"    Standing ER AAROM with cane 10x/10     Gentle cane AAROM scaption standing   10x/10"  Flexion supine with cane   10x10"    Standing ER AAROM with cane 10x/10 Gentle cane AAROM scaption standing   10x/10"  Flexion supine with cane   10x10"    Standing ER AAROM with cane 10x/10 Reviewed HEP                Reviewed HEP     Wrist AROM  RPM 10' alt        indv     composite  SL ER  2x10/3-5"  SL flex  2x10/3-5"      SL ER  2x10/3-5"  SL flex  2x10/3-5" SL ER  2x10/3-5"  SL flex  2x10/3-5"    Pendulums f/b, s/s  Finger Ladder AAROM flexion and scaption   10x/10" ea as inder  Finger Ladder AAROM flexion and scaption   10x/10" ea as inder  Finger Ladder AAROM flexion and scaption   10x/10" ea as inder  Finger Ladder AAROM flexion and scaption   10x/10" ea as inder     scap retract tband IR/ER walk out  10x10"  grn  tband IR/ER walk out  10x10"  grn tband IR/ER walk out  15x10"  grn    Pulleys Pulleys flexion and scap 3'ea Pulleys flexion and scap 3'ea Pulleys flexion and scap 3'ea Pulleys flexion and scap 3'ea    Table slides Prone rows  2x10    Prone ext   2x10/3-5"   Prone rows  2x10 2#    Prone ext   2x10/3-5"    Ther Activity                        Gait Training                        Modalities CP 10' no adverse effects CP 10' L shoulder  no adverse effects CP 10' L shoulder  no adverse effects CP 10' L shoulder  no adverse effects                            8/11 - HEP was issued and reviewed this date for above noted exercises  Encouragement for focus on increasing AAROM L shoulder more frequently t/o the day  Pt demonstrated understanding without incident and without questions/concerns  Will continue to update upcoming

## 2021-08-13 ENCOUNTER — OFFICE VISIT (OUTPATIENT)
Dept: PHYSICAL THERAPY | Facility: CLINIC | Age: 41
End: 2021-08-13
Payer: OTHER MISCELLANEOUS

## 2021-08-13 DIAGNOSIS — M24.812 INTERNAL DERANGEMENT OF SHOULDER, LEFT: Primary | ICD-10-CM

## 2021-08-13 PROCEDURE — 97110 THERAPEUTIC EXERCISES: CPT | Performed by: PHYSICAL MEDICINE & REHABILITATION

## 2021-08-13 PROCEDURE — 97140 MANUAL THERAPY 1/> REGIONS: CPT | Performed by: PHYSICAL MEDICINE & REHABILITATION

## 2021-08-13 NOTE — PROGRESS NOTES
Daily Note     Today's date: 2021  Patient name: Benjamin Engel  : 1980  MRN: [id number]  Referring provider: Asaf Medina  Dx:   Encounter Diagnosis     ICD-10-CM    1  Internal derangement of shoulder, left  M24 812                   Subjective: Pt notes he is "a little sore since last time" with the increased stretching  No new sx/complaints  MM soreness only  No loss of strength or ROM  Continues with limited AROM L shoulder < 90* 2* c/o pain and intermittent "catching" L anterior shoulder  RTD next week  Notes overall he feels he is improving  Objective: See treatment diary below      Assessment: Tolerated treatment well  AAROM on pulleys and finger ladder remains limited and apprehensive L shoulder  C/o anterior L shoulder pain and "catching"  PROM slowly improving L shoulder all planes; reduced stiffness with end ranges all planes; more of an empty end feel with creep noted  Continues with most tightness into end range abduction and ER; slowly improving  Scaption improving > flexion PROM; apprehension and stiffness noted at end ranges  ER remains limited with apprehension and stiff end feel with min creep  Added prone shoulder AROM flexion and horiz abd; flexion AROM in prone appears equal to supine PROM; limited by tighntess > pain per pt  Standing AROM flexion ~70* with pain, abduction 90* with pain  Reviewed prone/SL/standing AROM exercises and self stretching/ROM for HEP  Added 1# with SL ER: tolerated well without incident; quick mm fatigue noted  No complaints after tx  Patient demonstrated fatigue post treatment and would benefit from continued PT      Plan: Continue per plan of care  Progress treatment as tolerated         Precautions: s/p L RTC repair, NO AROM X 6 weeks       Re-eval Date:     Date 7/29 8/3 8/5 8/11 8/13   Visit Count 21 22 23 24 25   FOTO     *   Pain In 3/10 5/10 2-3/10 2-3/10 2/10   Pain Out 5/10 "stiff from the ice"  5/10 5/10 5/10 2-3/10 Manuals 7/29 8/3 8/5 8/11 8/13   PROM R shoulder   GENTLE    PROM L shoulder supine to tolerance, gentle end range stretching as tolerated,   IR/ER at ~45* abduction in scapular plane with arm supported on pillows  20' total tx  PROM L shoulder supine to tolerance, gentle end range stretching as tolerated,   IR/ER at ~45* abduction in scapular plane with arm supported on pillows  25' total tx  PROM L shoulder supine to tolerance, gentle end range stretching as tolerated,   IR/ER at ~45* abduction in scapular plane with arm supported on pillows  25' total tx  PROM L shoulder supine to tolerance,  end range stretching as tolerated,   IR/ER at ~45* abduction in scapular plane, sustained stretching at end range with gentle overpressure to tolerance   15' total tx  PROM L shoulder supine to tolerance,  end range stretching as tolerated,   IR/ER at ~45* abduction in scapular plane, sustained stretching at end range with gentle overpressure to tolerance   15' total tx    Dressing change                         Neuro Re-Ed                                                                Ther Ex        UT stretch    Levator stretch        C-spine AROM    Elbow AAROM Gentle cane AAROM scaption standing   10x/10"  Flexion supine with cane   10x10"    Standing ER AAROM with cane 10x/10     Gentle cane AAROM scaption standing   10x/10"  Flexion supine with cane   10x10"    Standing ER AAROM with cane 10x/10 Gentle cane AAROM scaption standing   10x/10"  Flexion supine with cane   10x10"    Standing ER AAROM with cane 10x/10 Reviewed HEP                Reviewed HEP     Wrist AROM  RPM 10' alt    * alt  10'      indv     composite  SL ER  2x10/3-5"  SL flex  2x10/3-5"      SL ER  2x10/3-5"  SL flex  2x10/3-5" SL ER  2x10/3-5"  SL flex  2x10/3-5" SL ER  2x10/3-5" 1#  SL flex  2x10/3-5" 0#   Pendulums f/b, s/s  Finger Ladder AAROM flexion and scaption   10x/10" ea as inder  Finger Ladder AAROM flexion and scaption   10x/10" ea as inder  Finger Ladder AAROM flexion and scaption   10x/10" ea as inder  Finger Ladder AAROM flexion and scaption   10x/10" ea as inder  Finger Ladder AAROM flexion and scaption   10x/10" ea as inder    scap retract tband IR/ER walk out  10x10"  grn  tband IR/ER walk out  10x10"  grn tband IR/ER walk out  15x10"  grn tband IR/ER walk out  15x10"  grn   Pulleys Pulleys flexion and scap 3'ea Pulleys flexion and scap 3'ea Pulleys flexion and scap 3'ea Pulleys flexion and scap 3'ea Pulleys flexion and scap 3'ea   Table slides Prone rows  2x10    Prone ext   2x10/3-5"   Prone rows  2x10 2#    Prone ext   2x10/3-5" Prone rows  2x10 2#    Prone ext   2x10/3-5"  1x5-6    Prone shoulder flexion 0#  2x10    Prone horiz shoulder abd 0#  2x10         Ther Activity                        Gait Training                        Modalities CP 10' no adverse effects CP 10' L shoulder  no adverse effects CP 10' L shoulder  no adverse effects CP 10' L shoulder  no adverse effects CP 10' L shoulder  no adverse effects

## 2021-08-17 ENCOUNTER — OFFICE VISIT (OUTPATIENT)
Dept: PHYSICAL THERAPY | Facility: CLINIC | Age: 41
End: 2021-08-17
Payer: OTHER MISCELLANEOUS

## 2021-08-17 ENCOUNTER — TELEPHONE (OUTPATIENT)
Dept: OBGYN CLINIC | Facility: HOSPITAL | Age: 41
End: 2021-08-17

## 2021-08-17 ENCOUNTER — OFFICE VISIT (OUTPATIENT)
Dept: OBGYN CLINIC | Facility: CLINIC | Age: 41
End: 2021-08-17
Payer: OTHER MISCELLANEOUS

## 2021-08-17 VITALS
DIASTOLIC BLOOD PRESSURE: 95 MMHG | HEART RATE: 108 BPM | SYSTOLIC BLOOD PRESSURE: 149 MMHG | HEIGHT: 78 IN | BODY MASS INDEX: 36.45 KG/M2 | WEIGHT: 315 LBS

## 2021-08-17 DIAGNOSIS — M24.812 INTERNAL DERANGEMENT OF SHOULDER, LEFT: Primary | ICD-10-CM

## 2021-08-17 DIAGNOSIS — Z98.890 S/P LEFT ROTATOR CUFF REPAIR: Primary | ICD-10-CM

## 2021-08-17 PROCEDURE — 97140 MANUAL THERAPY 1/> REGIONS: CPT

## 2021-08-17 PROCEDURE — 99213 OFFICE O/P EST LOW 20 MIN: CPT | Performed by: ORTHOPAEDIC SURGERY

## 2021-08-17 PROCEDURE — 97110 THERAPEUTIC EXERCISES: CPT

## 2021-08-17 NOTE — LETTER
August 17, 2021     Patient: Shravan Dial   YOB: 1980   Date of Visit: 8/17/2021       To Whom it May Concern:    Mil Pritchard is under my professional care  He was seen in my office on 8/17/2021  He may return to work with limitations Sitting only       If you have any questions or concerns, please don't hesitate to call  Sincerely,          Tigist Bass DO        CC: Jen Mckeon

## 2021-08-17 NOTE — PROGRESS NOTES
Daily Note     Today's date: 2021  Patient name: Rolf Gray  : 1980  MRN: [id number]  Referring provider: Ace Moffett  Dx:   Encounter Diagnosis     ICD-10-CM    1  Internal derangement of shoulder, left  M24 812        Start Time: 745  Stop Time: 830  Total time in clinic (min): 45 minutes    Subjective: "My shoulder is sore and stiff this morning "      Objective: See treatment diary below      Assessment: Tolerated treatment well  Pt continues with slow progression of therapy  Limited A/PROM noted in all planes with end range pain  Muscle guarding noted during PROM  Will continue to progress as able to increase motion and strength to return to PLOF  Patient demonstrated fatigue post treatment and would benefit from continued PT      Plan: Continue per plan of care  Progress treatment as tolerated         Precautions: s/p L RTC repair, NO AROM X 6 weeks       Re-eval Date:     Date        Visit Count 26       FOTO NV       Pain In 3/10       Pain Out 3-4/10              Manuals        PROM R shoulder   GENTLE    PROM L shoulder supine to tolerance,  end range stretching as tolerated,   IR/ER at ~45* abduction in scapular plane, sustained stretching at end range with gentle overpressure to tolerance   15' total tx        Dressing change                         Neuro Re-Ed                                                                Ther Ex        UT stretch    Levator stretch        C-spine AROM    Elbow AAROM        Wrist AROM  RPM 10' alt          indv     composite  SL ER  3x10/3-5" 1#  SL flex  3x10/3-5" 0#       Pendulums f/b, s/s  Finger Ladder AAROM flexion and scaption   10x/10" ea as inder        scap retract tband IR/ER walk out  15x10"  grn       Pulleys Pulleys flexion and scap 3'ea       Table slides Prone rows  2# 3x10    Prone ext   3x10/3-5"    Prone shoulder flexion 0#  3x10    Prone horiz shoulder abd 0#  3x10       Ther Activity Gait Training                        Modalities Deferred DR root

## 2021-08-17 NOTE — PROGRESS NOTES
Assessment/Plan:    No problem-specific Assessment & Plan notes found for this encounter  Diagnoses and all orders for this visit:    S/P left rotator cuff repair            Functionally, the patient is doing much better  Continue therapy for progressive range of motion strengthening  Return back in 6 weeks for evaluation  He was written for  Sitting only at work  If there is any problems sooner, he will not hesitate to let us know    Subjective:      Patient ID: Rolf Gray is a 36 y o  male  HPI      the patient is 3 months status post rotator cuff repair of his left shoulder  He states he is doing better, albeit some pain is present  He states it does feel better than preoperatively  He is actively participating in physical therapy  The following portions of the patient's history were reviewed and updated as appropriate: allergies, current medications, past family history, past medical history, past social history, past surgical history and problem list     Review of Systems   Constitutional: Negative for chills, fever and unexpected weight change  HENT: Negative for hearing loss, nosebleeds and sore throat  Eyes: Negative for pain, redness and visual disturbance  Respiratory: Negative for cough, shortness of breath and wheezing  Cardiovascular: Negative for chest pain, palpitations and leg swelling  Gastrointestinal: Negative for abdominal pain, nausea and vomiting  Endocrine: Negative for polydipsia and polyuria  Genitourinary: Negative for dysuria and hematuria  Musculoskeletal: Positive for arthralgias and myalgias  Negative for back pain, gait problem, joint swelling, neck pain and neck stiffness  As noted in HPI   Skin: Negative for rash and wound  Neurological: Negative for dizziness, numbness and headaches  Psychiatric/Behavioral: Negative for decreased concentration and suicidal ideas  The patient is not nervous/anxious            Objective:      /95 Pulse (!) 108   Ht 6' 6" (1 981 m)   Wt (!) 144 kg (317 lb)   BMI 36 63 kg/m²          Physical Exam        Neck was soft and supple  There is negative axial compression test in his neck  Left upper extremity is neurovascular intact  Fingers are pink and mobile  Compartments are soft  Incisions are clean, dry, intact  Active flexion and abduction of his left shoulder was to 100°  External rotation to approximately 30°  Rotator cuff strength testing was 4 to 4 5/5  Neurologically intact distally  Arc of motion is slightly improved

## 2021-08-17 NOTE — TELEPHONE ENCOUNTER
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

## 2021-08-19 ENCOUNTER — OFFICE VISIT (OUTPATIENT)
Dept: PHYSICAL THERAPY | Facility: CLINIC | Age: 41
End: 2021-08-19
Payer: OTHER MISCELLANEOUS

## 2021-08-19 DIAGNOSIS — M24.812 INTERNAL DERANGEMENT OF SHOULDER, LEFT: Primary | ICD-10-CM

## 2021-08-19 PROCEDURE — 97110 THERAPEUTIC EXERCISES: CPT

## 2021-08-19 PROCEDURE — 97140 MANUAL THERAPY 1/> REGIONS: CPT

## 2021-08-19 NOTE — PROGRESS NOTES
Daily Note     Today's date: 2021  Patient name: Sadie Marinelli  : 1980  MRN: [id number]  Referring provider: Mariella Valencia  Dx:   Encounter Diagnosis     ICD-10-CM    1  Internal derangement of shoulder, left  M24 812        Start Time: 830  Stop Time: 930  Total time in clinic (min): 60 minutes    Subjective: "I saw the Dr and he said it's going to take time  I am not going back to work until after next follow up  It doesn't feel that bad today  Objective: See treatment diary below      Assessment: Tolerated treatment well  Pt able to progress to weighted prone extension with challenge  Strength deficits noted with horizontal ABD  Slow and steady progression of PROM  Will continue to progress as able  Patient demonstrated fatigue post treatment and would benefit from continued PT      Plan: Continue per plan of care  Progress treatment as tolerated         Precautions: s/p L RTC repair, NO AROM X 6 weeks       Re-eval Date:     Date       Visit Count 26 27      FOTO NV       Pain In 3/10 2/10      Pain Out 3-4/10  2/10            Manuals       PROM R shoulder   GENTLE    PROM L shoulder supine to tolerance,  end range stretching as tolerated,   IR/ER at ~45* abduction in scapular plane, sustained stretching at end range with gentle overpressure to tolerance   15' total tx  PROM L shoulder supine to tolerance,  end range stretching as tolerated,   IR/ER at ~45* abduction in scapular plane, sustained stretching at end range with gentle overpressure to tolerance   15' total tx       Dressing change                         Neuro Re-Ed                                                                Ther Ex        UT stretch    Levator stretch        C-spine AROM    Elbow AAROM        Wrist AROM  RPM 10' alt  RPM 10' alt         indv     composite  SL ER  3x10/3-5" 1#  SL flex  3x10/3-5" 0# SL ER  3x10/3-5" 1#  SL flex  3x10/3-5" 0# Pendulums f/b, s/s  Finger Ladder AAROM flexion and scaption   10x/10" ea as inder  Finger Ladder AAROM flexion and scaption   10x/10" ea as inder       scap retract tband IR/ER walk out  15x10"  grn tband IR/ER walk out  15x10"  blue      Pulleys Pulleys flexion and scap 3'ea Pulleys flexion and scap 3'ea      Table slides Prone rows  2# 3x10    Prone ext   3x10/3-5"    Prone shoulder flexion 0#  3x10    Prone horiz shoulder abd 0#  3x10 Prone rows  2# 3x10    Prone ext   2# 3x10/3-5"    Prone shoulder flexion 0#  3x10    Prone horiz shoulder abd 0#  3x10      Ther Activity                        Gait Training                        Modalities Deferred DR root CP 10' no adverse effects

## 2021-08-24 ENCOUNTER — OFFICE VISIT (OUTPATIENT)
Dept: PHYSICAL THERAPY | Facility: CLINIC | Age: 41
End: 2021-08-24
Payer: OTHER MISCELLANEOUS

## 2021-08-24 DIAGNOSIS — M24.812 INTERNAL DERANGEMENT OF SHOULDER, LEFT: Primary | ICD-10-CM

## 2021-08-24 PROCEDURE — 97110 THERAPEUTIC EXERCISES: CPT

## 2021-08-24 PROCEDURE — 97140 MANUAL THERAPY 1/> REGIONS: CPT

## 2021-08-24 NOTE — PROGRESS NOTES
Daily Note     Today's date: 2021  Patient name: Kurt Bright  : 1980  MRN: [id number]  Referring provider: Savanna Humphrey  Dx:   Encounter Diagnosis     ICD-10-CM    1  Internal derangement of shoulder, left  M24 812        Start Time: 830  Stop Time: 925  Total time in clinic (min): 55 minutes    Subjective: "I feel I can move my shoulder more but it still feels like it catches when I lower it back down "      Objective: See treatment diary below      Assessment: Tolerated treatment well  Initiated tband scapular strengthening this session with good tolerance, challenged with rotation, ER>IR  Improved PROM in flexion and abduction with decreased pain than previous noted; "catching" on lowering motion continues  Will continue to progress as able  Patient demonstrated fatigue post treatment and would benefit from continued PT      Plan: Continue per plan of care  Progress treatment as tolerated         Precautions: s/p L RTC repair, NO AROM X 6 weeks       Re-eval Date:     Date      Visit Count 26 27 28     FOTO NV       Pain In 3/10 210 1-2/10     Pain Out 3-4/10  2/10            Manuals      PROM R shoulder   GENTLE    PROM L shoulder supine to tolerance,  end range stretching as tolerated,   IR/ER at ~45* abduction in scapular plane, sustained stretching at end range with gentle overpressure to tolerance   15' total tx  PROM L shoulder supine to tolerance,  end range stretching as tolerated,   IR/ER at ~45* abduction in scapular plane, sustained stretching at end range with gentle overpressure to tolerance   15' total tx  PROM L shoulder supine to tolerance,  end range stretching as tolerated,   IR/ER at ~45* abduction in scapular plane, sustained stretching at end range with gentle overpressure to tolerance   15' total tx      Dressing change                         Neuro Re-Ed                                                                Ther Ex UT stretch    Levator stretch        C-spine AROM    Elbow AAROM   MTP/LTP  grn 2x10/3-5"     Wrist AROM  RPM 10' alt  RPM 10' alt         indv     composite  SL ER  3x10/3-5" 1#  SL flex  3x10/3-5" 0# SL ER  3x10/3-5" 1#  SL flex  3x10/3-5" 0# SL ER  3x10/3-5" 1#  SL flex  3x10/3-5" 1#     Pendulums f/b, s/s  Finger Ladder AAROM flexion and scaption   10x/10" ea as inder  Finger Ladder AAROM flexion and scaption   10x/10" ea as inder  Finger Ladder AAROM flexion and scaption   10x/10" ea as inder      scap retract tband IR/ER walk out  15x10"  grn tband IR/ER walk out  15x10"  blue tband IR/ER  grn  2x10/3-5"     Pulleys Pulleys flexion and scap 3'ea Pulleys flexion and scap 3'ea      Table slides Prone rows  2# 3x10    Prone ext   3x10/3-5"    Prone shoulder flexion 0#  3x10    Prone horiz shoulder abd 0#  3x10 Prone rows  2# 3x10    Prone ext   2# 3x10/3-5"    Prone shoulder flexion 0#  3x10    Prone horiz shoulder abd 0#  3x10 Prone rows  2# 3x10    Prone ext   2# 3x10/3-5"    Prone shoulder flexion 0#  3x10    Prone horiz shoulder abd 0#  3x10     Ther Activity                        Gait Training                        Modalities Deferred DR root CP 10' no adverse effects CP 10' no adverse effects

## 2021-08-26 ENCOUNTER — OFFICE VISIT (OUTPATIENT)
Dept: PHYSICAL THERAPY | Facility: CLINIC | Age: 41
End: 2021-08-26
Payer: OTHER MISCELLANEOUS

## 2021-08-26 DIAGNOSIS — M24.812 INTERNAL DERANGEMENT OF SHOULDER, LEFT: Primary | ICD-10-CM

## 2021-08-26 PROCEDURE — 97110 THERAPEUTIC EXERCISES: CPT

## 2021-08-26 PROCEDURE — 97140 MANUAL THERAPY 1/> REGIONS: CPT

## 2021-08-26 NOTE — PROGRESS NOTES
Daily Note     Today's date: 2021  Patient name: Marilyn John  : 1980  MRN: [id number]  Referring provider: Melinda Jordan  Dx:   Encounter Diagnosis     ICD-10-CM    1  Internal derangement of shoulder, left  M24 812        Start Time: 830  Stop Time: 930  Total time in clinic (min): 60 minutes    Subjective: "I know I am getting better  Yesterday, I tripped going up the steps and reached out with my L shoulder to stop me  I had initial pain for 30 seconds then it went away  It feels fine today "      Objective: See treatment diary below      Assessment: Tolerated treatment well  Pt is showing slow and steady progression with strength and motion  Able to complete program with decreased muscle soreness/pain  AROM continues to be limiting; will continue to monitor and progress as inder  Able to complete prone exercises with greater ease, decreased rest periods and increased range  Patient demonstrated fatigue post treatment and would benefit from continued PT      Plan: Continue per plan of care  Progress treatment as tolerated         Precautions: s/p L RTC repair, NO AROM X 6 weeks       Re-eval Date:     Date     Visit Count 26 27 28 29    FOTO NV       Pain In 3/10 2/10 1-2/10 1-2    Pain Out 3-4/10  2/10  1-2          Manuals     PROM R shoulder   GENTLE    PROM L shoulder supine to tolerance,  end range stretching as tolerated,   IR/ER at ~45* abduction in scapular plane, sustained stretching at end range with gentle overpressure to tolerance   15' total tx  PROM L shoulder supine to tolerance,  end range stretching as tolerated,   IR/ER at ~45* abduction in scapular plane, sustained stretching at end range with gentle overpressure to tolerance   15' total tx  PROM L shoulder supine to tolerance,  end range stretching as tolerated,   IR/ER at ~45* abduction in scapular plane, sustained stretching at end range with gentle overpressure to tolerance   15' total tx  PROM L shoulder supine to tolerance,  end range stretching as tolerated,   IR/ER at ~45* abduction in scapular plane, sustained stretching at end range with gentle overpressure to tolerance   15' total tx     Dressing change                         Neuro Re-Ed                                                                Ther Ex        UT stretch    Levator stretch        C-spine AROM    Elbow AAROM   MTP/LTP  grn 2x10/3-5" MTP/LTP  grn 3x10/3-5"    Wrist AROM  RPM 10' alt  RPM 10' alt   RPM 10' alt       indv     composite  SL ER  3x10/3-5" 1#  SL flex  3x10/3-5" 0# SL ER  3x10/3-5" 1#  SL flex  3x10/3-5" 0# SL ER  3x10/3-5" 1#  SL flex  3x10/3-5" 1# SL ER  3x10/3-5" 1#  SL flex  3x10/3-5" 1#    Pendulums f/b, s/s  Finger Ladder AAROM flexion and scaption   10x/10" ea as inder  Finger Ladder AAROM flexion and scaption   10x/10" ea as inder  Finger Ladder AAROM flexion and scaption   10x/10" ea as inder  Finger Ladder AAROM flexion and scaption   10x/10" ea as inder     scap retract tband IR/ER walk out  15x10"  grn tband IR/ER walk out  15x10"  blue tband IR/ER  grn  2x10/3-5" tband IR/ER  grn  3x10/3-5"    Pulleys Pulleys flexion and scap 3'ea Pulleys flexion and scap 3'ea  Pulleys flexion and scap 3'ea    Table slides Prone rows  2# 3x10    Prone ext   3x10/3-5"    Prone shoulder flexion 0#  3x10    Prone horiz shoulder abd 0#  3x10 Prone rows  2# 3x10    Prone ext   2# 3x10/3-5"    Prone shoulder flexion 0#  3x10    Prone horiz shoulder abd 0#  3x10 Prone rows  2# 3x10    Prone ext   2# 3x10/3-5"    Prone shoulder flexion 0#  3x10    Prone horiz shoulder abd 0#  3x10 Prone rows  2# 3x10    Prone ext   2# 3x10/3-5"    Prone shoulder flexion 0#  3x10    Prone horiz shoulder abd 0#  3x10    Ther Activity                        Gait Training                        Modalities Deferred DR root CP 10' no adverse effects CP 10' no adverse effects CP 10' no adverse effects

## 2021-08-31 ENCOUNTER — OFFICE VISIT (OUTPATIENT)
Dept: PHYSICAL THERAPY | Facility: CLINIC | Age: 41
End: 2021-08-31
Payer: OTHER MISCELLANEOUS

## 2021-08-31 DIAGNOSIS — M24.812 INTERNAL DERANGEMENT OF SHOULDER, LEFT: Primary | ICD-10-CM

## 2021-08-31 PROCEDURE — 97140 MANUAL THERAPY 1/> REGIONS: CPT

## 2021-08-31 PROCEDURE — 97110 THERAPEUTIC EXERCISES: CPT

## 2021-08-31 NOTE — PROGRESS NOTES
Daily Note     Today's date: 2021  Patient name: Rolf Gray  : 1980  MRN: [id number]  Referring provider: Ace Moffett  Dx:   Encounter Diagnosis     ICD-10-CM    1  Internal derangement of shoulder, left  M24 812        Start Time: 830  Stop Time: 925  Total time in clinic (min): 55 minutes    Subjective: "My shoulder is getting better "      Objective: See treatment diary below      Assessment: Tolerated treatment well  Pt is progressing slow and steady  Able to increase weight on various exercises with discomfort, possible 2* weakness  Increased PROM in all planes with minimal end range pain, stretch noted  ER continues to be painful, pt noted feeling "shoulder is going to pop out of the socket "  Will continue to monitor and progress as able  Patient demonstrated fatigue post treatment and would benefit from continued PT      Plan: Continue per plan of care  Progress treatment as tolerated         Precautions: s/p L RTC repair, NO AROM X 6 weeks       Re-eval Date:     Date    Visit Count 26 27 28 29 30   FOTO NV       Pain In 3/10 2/10 1-2/10 1-2 1/10   Pain Out 3-4/10  2/10  1-2          Manuals    PROM R shoulder   GENTLE    PROM L shoulder supine to tolerance,  end range stretching as tolerated,   IR/ER at ~45* abduction in scapular plane, sustained stretching at end range with gentle overpressure to tolerance   15' total tx  PROM L shoulder supine to tolerance,  end range stretching as tolerated,   IR/ER at ~45* abduction in scapular plane, sustained stretching at end range with gentle overpressure to tolerance   15' total tx  PROM L shoulder supine to tolerance,  end range stretching as tolerated,   IR/ER at ~45* abduction in scapular plane, sustained stretching at end range with gentle overpressure to tolerance   15' total tx  PROM L shoulder supine to tolerance,  end range stretching as tolerated,   IR/ER at ~45* abduction in scapular plane, sustained stretching at end range with gentle overpressure to tolerance   15' total tx  PROM L shoulder supine to tolerance,  end range stretching as tolerated,   IR/ER at ~45* abduction in scapular plane, sustained stretching at end range with gentle overpressure to tolerance   15' total tx    Dressing change                         Neuro Re-Ed                                                                Ther Ex        UT stretch    Levator stretch        C-spine AROM    Elbow AAROM   MTP/LTP  grn 2x10/3-5" MTP/LTP  grn 3x10/3-5" MTP/LTP  grn 3x10/3-5"   Wrist AROM  RPM 10' alt  RPM 10' alt   RPM 10' alt  RPM 10' alt      indv     composite  SL ER  3x10/3-5" 1#  SL flex  3x10/3-5" 0# SL ER  3x10/3-5" 1#  SL flex  3x10/3-5" 0# SL ER  3x10/3-5" 1#  SL flex  3x10/3-5" 1# SL ER  3x10/3-5" 1#  SL flex  3x10/3-5" 1# SL ER  3x10/3-5" 1#  SL flex  3x10/3-5" 1#   Pendulums f/b, s/s  Finger Ladder AAROM flexion and scaption   10x/10" ea as inder  Finger Ladder AAROM flexion and scaption   10x/10" ea as inder  Finger Ladder AAROM flexion and scaption   10x/10" ea as inder  Finger Ladder AAROM flexion and scaption   10x/10" ea as inder  Finger Ladder AAROM flexion and scaption   10x/10" ea as inder    scap retract tband IR/ER walk out  15x10"  grn tband IR/ER walk out  15x10"  blue tband IR/ER  grn  2x10/3-5" tband IR/ER  grn  3x10/3-5" tband IR/ER  grn  3x10/3-5"   Pulleys Pulleys flexion and scap 3'ea Pulleys flexion and scap 3'ea  Pulleys flexion and scap 3'ea Pulleys flexion and scap 3'ea   Table slides Prone rows  2# 3x10    Prone ext   3x10/3-5"    Prone shoulder flexion 0#  3x10    Prone horiz shoulder abd 0#  3x10 Prone rows  2# 3x10    Prone ext   2# 3x10/3-5"    Prone shoulder flexion 0#  3x10    Prone horiz shoulder abd 0#  3x10 Prone rows  2# 3x10    Prone ext   2# 3x10/3-5"    Prone shoulder flexion 0#  3x10    Prone horiz shoulder abd 0#  3x10 Prone rows  2# 3x10    Prone ext   2# 3x10/3-5"    Prone shoulder flexion 0#  3x10    Prone horiz shoulder abd 0#  3x10 Prone rows  3# 3x10    Prone ext   3# 3x10/3-5"    Prone shoulder flexion 3#  3x10    Prone horiz shoulder abd 3#  3x10   Ther Activity                        Gait Training                        Modalities Deferred DR appt CP 10' no adverse effects CP 10' no adverse effects CP 10' no adverse effects

## 2021-09-02 ENCOUNTER — OFFICE VISIT (OUTPATIENT)
Dept: PHYSICAL THERAPY | Facility: CLINIC | Age: 41
End: 2021-09-02
Payer: OTHER MISCELLANEOUS

## 2021-09-02 DIAGNOSIS — M24.812 INTERNAL DERANGEMENT OF SHOULDER, LEFT: Primary | ICD-10-CM

## 2021-09-02 PROCEDURE — 97140 MANUAL THERAPY 1/> REGIONS: CPT

## 2021-09-02 PROCEDURE — 97110 THERAPEUTIC EXERCISES: CPT

## 2021-09-02 NOTE — PROGRESS NOTES
Daily Note     Today's date: 2021  Patient name: Maylin Julian  : 1980  MRN: [id number]  Referring provider: Josephine Mayfield  Dx:   Encounter Diagnosis     ICD-10-CM    1  Internal derangement of shoulder, left  M24 812        Start Time: 830  Stop Time: 925  Total time in clinic (min): 55 minutes    Subjective: Pt offered no specific concerns  Objective: See treatment diary below      Assessment: Tolerated treatment well  Able to increase weight on prone strengthening without significant increase in symptoms  Steady progression of PROM in all planes, ER remains most restricted and painful  Will continue to progress as able to return to PLOF  Patient demonstrated fatigue post treatment and would benefit from continued PT      Plan: Continue per plan of care  Progress treatment as tolerated         Precautions: s/p L RTC repair, NO AROM X 6 weeks       Re-eval Date:     Date        Visit Count 31       FOTO        Pain In 1/10       Pain Out 2-3/10             Manuals        PROM R shoulder   GENTLE    PROM L shoulder supine to tolerance,  end range stretching as tolerated,   IR/ER at ~45* abduction in scapular plane, sustained stretching at end range with gentle overpressure to tolerance   15' total tx        Dressing change                         Neuro Re-Ed                                                                Ther Ex        UT stretch    Levator stretch        C-spine AROM    Elbow AAROM MTP/LTP  blue 3x10/3-5"       Wrist AROM UBE 90 RPM 10' alt        indv     composite  SL ER  3x10/3-5" 3#  SL flex  3x10/3-5" 1#       Pendulums f/b, s/s  Finger Ladder AAROM flexion and scaption   10x/10" ea as inder        scap retract tband IR/ER blue  3x10/3-5"         Pulleys Pulleys flexion and scap 3'ea       Table slides Prone rows  3# 3x10    Prone ext   3# 3x10/3-5"    Prone shoulder flexion 3#  3x10    Prone horiz shoulder abd 3#  3x10       Ther Activity Gait Training                        Modalities CP 10' no adverse effects

## 2021-09-08 ENCOUNTER — OFFICE VISIT (OUTPATIENT)
Dept: PHYSICAL THERAPY | Facility: CLINIC | Age: 41
End: 2021-09-08
Payer: OTHER MISCELLANEOUS

## 2021-09-08 DIAGNOSIS — M24.812 INTERNAL DERANGEMENT OF SHOULDER, LEFT: Primary | ICD-10-CM

## 2021-09-08 PROCEDURE — 97110 THERAPEUTIC EXERCISES: CPT

## 2021-09-08 PROCEDURE — 97140 MANUAL THERAPY 1/> REGIONS: CPT

## 2021-09-09 ENCOUNTER — OFFICE VISIT (OUTPATIENT)
Dept: PHYSICAL THERAPY | Facility: CLINIC | Age: 41
End: 2021-09-09
Payer: OTHER MISCELLANEOUS

## 2021-09-09 DIAGNOSIS — M24.812 INTERNAL DERANGEMENT OF SHOULDER, LEFT: Primary | ICD-10-CM

## 2021-09-09 PROCEDURE — 97140 MANUAL THERAPY 1/> REGIONS: CPT

## 2021-09-09 PROCEDURE — 97110 THERAPEUTIC EXERCISES: CPT

## 2021-09-09 NOTE — PROGRESS NOTES
Daily Note     Today's date: 2021  Patient name: Ira Marques  : 1980  MRN: [id number]  Referring provider: Thea Turcios  Dx:   Encounter Diagnosis     ICD-10-CM    1  Internal derangement of shoulder, left  M24 812        Start Time: 915  Stop Time: 1015  Total time in clinic (min): 60 minutes    Subjective: "My shoulder is a little sore from having therapy yesterday "      Objective: See treatment diary below      Assessment: Tolerated treatment well  Pt continues to increase motion and strength  End range pain/stretch continues  Rotation restrictions most limiting  Able to increase functional use at home  Will continue to progress as able  Patient demonstrated fatigue post treatment and would benefit from continued PT      Plan: Continue per plan of care  Progress treatment as tolerated         Precautions: s/p L RTC repair, NO AROM X 6 weeks       Re-eval Date:     Date      Visit Count 31 32 33     FOTO        Pain In 1/10 1/10 2-3/10     Pain Out -3/10 1/10 1-2/10     /      Manuals      PROM R shoulder   GENTLE    PROM L shoulder supine to tolerance,  end range stretching as tolerated,   IR/ER at ~45* abduction in scapular plane, sustained stretching at end range with gentle overpressure to tolerance   15' total tx  PROM L shoulder supine to tolerance,  end range stretching as tolerated,   IR/ER at ~45* abduction in scapular plane, sustained stretching at end range with gentle overpressure to tolerance   15' total tx  PROM L shoulder supine to tolerance,  end range stretching as tolerated,   IR/ER at ~45* abduction in scapular plane, sustained stretching at end range with gentle overpressure to tolerance   15' total tx      Dressing change                         Neuro Re-Ed                                                                Ther Ex        UT stretch    Levator stretch        C-spine AROM    Elbow AAROM MTP/LTP  blue 3x10/3-5" MTP/LTP  blue 3x10/3-5" MTP/LTP  blue 3x10/3-5"     Wrist AROM UBE 90 RPM 10' alt UBE 90 RPM 10' alt UBE 90 RPM 10' alt      indv     composite  SL ER  3x10/3-5" 3#  SL flex  3x10/3-5" 1# SL ER  3x10/3-5" 3#  SL flex  3x10/3-5" 3# SL ER  3x10/3-5" 3#  SL flex  3x10/3-5" 3#     Pendulums f/b, s/s  Finger Ladder AAROM flexion and scaption   10x/10" ea as inder  Finger Ladder AAROM flexion and scaption   10x/10" ea as inder       scap retract tband IR/ER blue  3x10/3-5"   tband IR/ER blue  3x10/3-5" tband IR/ER blue  3x10/3-5"     Pulleys Pulleys flexion and scap 3'ea AROM flex/scap  2x10/3-5" AROM flex/scap  2x10/3-5"     Table slides Prone rows  3# 3x10    Prone ext   3# 3x10/3-5"    Prone shoulder flexion 3#  3x10    Prone horiz shoulder abd 3#  3x10 Prone rows  3# 3x10    Prone ext   3# 3x10/3-5"    Prone shoulder flexion 3#  3x10    Prone horiz shoulder abd 3#  3x10 Prone rows  3# 3x10    Prone ext   3# 3x10/3-5"    Prone shoulder flexion 3#  3x10    Prone horiz shoulder abd 3#  3x10     Ther Activity                        Gait Training                        Modalities CP 10' no adverse effects CP 10' no adverse effects CP 10' no adverse effects

## 2021-09-14 ENCOUNTER — OFFICE VISIT (OUTPATIENT)
Dept: PHYSICAL THERAPY | Facility: CLINIC | Age: 41
End: 2021-09-14
Payer: OTHER MISCELLANEOUS

## 2021-09-14 DIAGNOSIS — M24.812 INTERNAL DERANGEMENT OF SHOULDER, LEFT: Primary | ICD-10-CM

## 2021-09-14 PROCEDURE — 97140 MANUAL THERAPY 1/> REGIONS: CPT

## 2021-09-14 PROCEDURE — 97110 THERAPEUTIC EXERCISES: CPT

## 2021-09-14 NOTE — PROGRESS NOTES
Daily Note     Today's date: 2021  Patient name: Luis Enrique Garcia  : 1980  MRN: [id number]  Referring provider: Ace Cat  Dx:   Encounter Diagnosis     ICD-10-CM    1  Internal derangement of shoulder, left  M24 812        Start Time: 915  Stop Time: 101  Total time in clinic (min): 60 minutes    Subjective: "My shoulder isn't that bad this morning but I really didn't do much yet "      Objective: See treatment diary below      Assessment: Tolerated treatment well  Pt has been slow and steadily increased AROM and strength  Progressed with PNF patterns with difficulty fulling extending L UE  PROM increasing with continued end ragne pain, especially with ER/IR  Will continue to progress with strength and motion to return to PLOF  Patient demonstrated fatigue post treatment and would benefit from continued PT      Plan: Continue per plan of care  Progress treatment as tolerated         Precautions: s/p L RTC repair, NO AROM X 6 weeks       Re-eval Date:     Date     Visit Count 31 32 33 34    FOTO        Pain In 1/10 1/10 2-3/10 2-3    Pain Out -3/10 1/10 1-2/10 1-2    /      Manuals     PROM R shoulder   GENTLE    PROM L shoulder supine to tolerance,  end range stretching as tolerated,   IR/ER at ~45* abduction in scapular plane, sustained stretching at end range with gentle overpressure to tolerance   15' total tx  PROM L shoulder supine to tolerance,  end range stretching as tolerated,   IR/ER at ~45* abduction in scapular plane, sustained stretching at end range with gentle overpressure to tolerance   15' total tx  PROM L shoulder supine to tolerance,  end range stretching as tolerated,   IR/ER at ~45* abduction in scapular plane, sustained stretching at end range with gentle overpressure to tolerance   15' total tx      Dressing change                         Neuro Re-Ed                                                                Ther Ex UT stretch    Levator stretch        C-spine AROM    Elbow AAROM MTP/LTP  blue 3x10/3-5" MTP/LTP  blue 3x10/3-5" MTP/LTP  blue 3x10/3-5" MTP/LTP  blue 3x10/3-5"    Wrist AROM UBE 90 RPM 10' alt UBE 90 RPM 10' alt UBE 90 RPM 10' alt UBE 80 RPM 10' alt     indv     composite  SL ER  3x10/3-5" 3#  SL flex  3x10/3-5" 1# SL ER  3x10/3-5" 3#  SL flex  3x10/3-5" 3# SL ER  3x10/3-5" 3#  SL flex  3x10/3-5" 3# SL ER  3x10/3-5" 3#  SL flex  3x10/3-5" 3#    Pendulums f/b, s/s  Finger Ladder AAROM flexion and scaption   10x/10" ea as inder  Finger Ladder AAROM flexion and scaption   10x/10" ea as inder   Wall push ups  2x10    Standing PNF  2x10    scap retract tband IR/ER blue  3x10/3-5"   tband IR/ER blue  3x10/3-5" tband IR/ER blue  3x10/3-5" tband IR/ER blue  3x10/3-5"    Pulleys Pulleys flexion and scap 3'ea AROM flex/scap  2x10/3-5" AROM flex/scap  2x10/3-5" AROM flex/scap  3x10/3-5"    Table slides Prone rows  3# 3x10    Prone ext   3# 3x10/3-5"    Prone shoulder flexion 3#  3x10    Prone horiz shoulder abd 3#  3x10 Prone rows  3# 3x10    Prone ext   3# 3x10/3-5"    Prone shoulder flexion 3#  3x10    Prone horiz shoulder abd 3#  3x10 Prone rows  3# 3x10    Prone ext   3# 3x10/3-5"    Prone shoulder flexion 3#  3x10    Prone horiz shoulder abd 3#  3x10 Prone rows  3# 3x10    Prone ext   3# 3x10/3-5"    Prone shoulder flexion 3#  3x10    Prone horiz shoulder abd 3#  3x10    Ther Activity                        Gait Training                        Modalities CP 10' no adverse effects CP 10' no adverse effects CP 10' no adverse effects CP 10' no adverse effects

## 2021-09-16 ENCOUNTER — OFFICE VISIT (OUTPATIENT)
Dept: PHYSICAL THERAPY | Facility: CLINIC | Age: 41
End: 2021-09-16
Payer: OTHER MISCELLANEOUS

## 2021-09-16 DIAGNOSIS — M24.812 INTERNAL DERANGEMENT OF SHOULDER, LEFT: Primary | ICD-10-CM

## 2021-09-16 PROCEDURE — 97110 THERAPEUTIC EXERCISES: CPT

## 2021-09-16 PROCEDURE — 97112 NEUROMUSCULAR REEDUCATION: CPT

## 2021-09-16 PROCEDURE — 97140 MANUAL THERAPY 1/> REGIONS: CPT

## 2021-09-16 NOTE — PROGRESS NOTES
Daily Note     Today's date: 2021  Patient name: Shravan Dial  : 1980  MRN: [id number]  Referring provider: Amelie Caraballo  Dx:   Encounter Diagnosis     ICD-10-CM    1  Internal derangement of shoulder, left  M24 812        Start Time: 915  Stop Time: 1020  Total time in clinic (min): 65 minutes    Subjective: "My shoulder has been sore but I think it's because of the new exercises "      Objective: See treatment diary below      Assessment: Tolerated treatment well  Able to increase weight on various exercises without incident  Limited AROM with PNF pattern noted 2* strength deficits at end range  PROM continues to improve with end range ER tightness and pain noted  Will continue to progress as able to return to PLOF  Patient demonstrated fatigue post treatment and would benefit from continued PT      Plan: Continue per plan of care  Progress treatment as tolerated         Precautions: s/p L RTC repair, NO AROM X 6 weeks       Re-eval Date:     Date    Visit Count 31 32 33 34 35   FOTO        Pain In 1/10 1/10 2-3/10 2-3 2/10   Pain Out 2-3/10 1/10 1-2/10 1-2 2/10   /      Manuals    PROM R shoulder   GENTLE    PROM L shoulder supine to tolerance,  end range stretching as tolerated,   IR/ER at ~45* abduction in scapular plane, sustained stretching at end range with gentle overpressure to tolerance   15' total tx  PROM L shoulder supine to tolerance,  end range stretching as tolerated,   IR/ER at ~45* abduction in scapular plane, sustained stretching at end range with gentle overpressure to tolerance   15' total tx  PROM L shoulder supine to tolerance,  end range stretching as tolerated,   IR/ER at ~45* abduction in scapular plane, sustained stretching at end range with gentle overpressure to tolerance   15' total tx   PROM L shoulder supine to tolerance,  end range stretching as tolerated,   IR/ER at ~45* abduction in scapular plane, sustained stretching at end range with gentle overpressure to tolerance   15' total tx    Dressing change                      Rhythmic stabilization  3x30" ea   Neuro Re-Ed                                                                Ther Ex        UT stretch    Levator stretch        C-spine AROM    Elbow AAROM MTP/LTP  blue 3x10/3-5" MTP/LTP  blue 3x10/3-5" MTP/LTP  blue 3x10/3-5" MTP/LTP  blue 3x10/3-5" MTP/LTP  blue 3x10/3-5"   Wrist AROM UBE 90 RPM 10' alt UBE 90 RPM 10' alt UBE 90 RPM 10' alt UBE 80 RPM 10' alt UBE 80 RPM 10' alt    indv     composite  SL ER  3x10/3-5" 3#  SL flex  3x10/3-5" 1# SL ER  3x10/3-5" 3#  SL flex  3x10/3-5" 3# SL ER  3x10/3-5" 3#  SL flex  3x10/3-5" 3# SL ER  3x10/3-5" 3#  SL flex  3x10/3-5" 3# SL ER  3x10/3-5" 4#  SL flex  3x10/3-5" 3#   Pendulums f/b, s/s  Finger Ladder AAROM flexion and scaption   10x/10" ea as inder  Finger Ladder AAROM flexion and scaption   10x/10" ea as inder   Wall push ups  2x10    Standing PNF  2x10 Wall push ups  2x10    Standing PNF  2x10   scap retract tband IR/ER blue  3x10/3-5"   tband IR/ER blue  3x10/3-5" tband IR/ER blue  3x10/3-5" tband IR/ER blue  3x10/3-5" tband IR/ER blue  3x10/3-5"   Pulleys Pulleys flexion and scap 3'ea AROM flex/scap  2x10/3-5" AROM flex/scap  2x10/3-5" AROM flex/scap  3x10/3-5" AROM flex/scap  3x10/3-5"   Table slides Prone rows  3# 3x10    Prone ext   3# 3x10/3-5"    Prone shoulder flexion 3#  3x10    Prone horiz shoulder abd 3#  3x10 Prone rows  3# 3x10    Prone ext   3# 3x10/3-5"    Prone shoulder flexion 3#  3x10    Prone horiz shoulder abd 3#  3x10 Prone rows  3# 3x10    Prone ext   3# 3x10/3-5"    Prone shoulder flexion 3#  3x10    Prone horiz shoulder abd 3#  3x10 Prone rows  3# 3x10    Prone ext   3# 3x10/3-5"    Prone shoulder flexion 3#  3x10    Prone horiz shoulder abd 3#  3x10 Prone rows  4# 3x10    Prone ext   4# 3x10/3-5"    Prone shoulder flexion 3#  3x10    Prone horiz shoulder abd 3#  3x10   Ther Activity                        Gait Training                        Modalities CP 10' no adverse effects CP 10' no adverse effects CP 10' no adverse effects CP 10' no adverse effects CP 10' no adverse effects

## 2021-09-21 ENCOUNTER — EVALUATION (OUTPATIENT)
Dept: PHYSICAL THERAPY | Facility: CLINIC | Age: 41
End: 2021-09-21
Payer: OTHER MISCELLANEOUS

## 2021-09-21 DIAGNOSIS — M24.812 INTERNAL DERANGEMENT OF SHOULDER, LEFT: Primary | ICD-10-CM

## 2021-09-21 PROCEDURE — 97140 MANUAL THERAPY 1/> REGIONS: CPT | Performed by: PHYSICAL MEDICINE & REHABILITATION

## 2021-09-21 PROCEDURE — 97110 THERAPEUTIC EXERCISES: CPT | Performed by: PHYSICAL MEDICINE & REHABILITATION

## 2021-09-21 NOTE — PROGRESS NOTES
PT Re-Evaluation     Today's date: 2021  Patient name: Hari Hunter  : 1980   MRN: [id number]  Referring provider: Theresa Gant  Dx:   Encounter Diagnosis     ICD-10-CM    1  Internal derangement of shoulder, left  M24 812                   Assessment  Assessment details: Hari Hunter is a 36 y o  male presenting to outpatient physical therapy with diagnosis of Internal derangement of shoulder, left  (primary encounter diagnosis)  He is s/p L shoulder arthroscopy with L RTC repair on 21  Paul Saldanaer has been compliant with attending PT and notes compliance with completing home exercise program since initial eval and most recent re-eval  His progress as been overall slow as noted previously, however since last Re-eval he has demonstrated significant improvement in L GHJ A/PROM, L shoulder girdle strength, and improvements in exercise/activity tolerance with L shoulder/UE  His pain levels have overall decreased, however he continues with painful and limited ROM all planes L shoulder  Continues to note increased pain with all exercise/use of LUE  Strength is improving with PREs despite pain  He continues with stiffness at all end ranges PROM L shoulder with continued limited tolerance for end range stretching; his PROM has demonstrated significant improvement from last re-eval, however is not back to normal limits at this time  Due to noted impairments, pt continues with significant functional limitations including inability to use L arm with ADLs, inability to lift, difficulty bathing/dressing, interrupted sleep, and inability to RTW  Paul Colmenares continues with above listed impairments and would benefit from additional skilled PT to address these deficits to return to prior level of function      Impairments: abnormal muscle firing, abnormal muscle tone, abnormal or restricted ROM, activity intolerance, impaired physical strength, lacks appropriate home exercise program and pain with function    Symptom irritability: moderateUnderstanding of Dx/Px/POC: good   Prognosis: fair    Goals  STGs to be achieved in 4-6 weeks:  1  Pt to demonstrate reduced subjective pain rating "at worst" by at least 2-3 points from Initial Eval in order to allow for reduced pain with ADLs and improved functional activity tolerance  - progressing  2  Pt to demonstrate increased strength of L elbow/wrist/hand to at least 4-4+/5 to improve functional independence with ADLs  - met  3  Pt to demonstrate grossly WFL PROM L shoulder without increase in pain/sx as allowable per protocol restrictions   - not met, continue  , progressing - Increase L shoulder elevation PROM to at least 150* by next RE-eval  - met,, increase to 165 by next RE   4  Pt will increase L shoulder AROM elevation to at least 110* without increased pain or compensatory strategies  - partially met, cont with pain and scapular elevation     LTGs to be achieved in 14-16 weeks:  1  Pt will be I with HEP in order to continue to improve quality of life and independence and reduce risk for re-injury  - progressing   2  Pt to demonstrate return to ADLs and work without limitations or restrictions  - not met   3  Pt to demonstrate improved function as noted by achieving or exceeding predicted score on FOTO outcomes assessment tool   - progressing       Plan  Patient would benefit from: skilled physical therapy  Referral necessary: No  Planned modality interventions: cryotherapy and thermotherapy: hydrocollator packs  Planned therapy interventions: joint mobilization, manual therapy, neuromuscular re-education, patient education, postural training, self care, strengthening, stretching, therapeutic activities, therapeutic exercise and home exercise program  Frequency: 2x week  Duration in visits: 12  Duration in weeks: 6  Plan of Care beginning date: 9/21/2021  Plan of Care expiration date: 11/2/2021  Treatment plan discussed with: patient        Subjective Evaluation    History of Present Illness  Mechanism of injury: surgery  Mechanism of injury: Overall 75-80# improvement L shoulder  RTD next Tuesday  Feels he is making improvements  Reports reduced shoulder pain, improving L shoulder A/PROM, and improving L shoulder/UE strength  Is able to use L UE/shoulder more at home with activities  No new sx/complaints  Notes cont'd popping/catching/grinding L shoulder with AROM; avoids ranges this seems to occur in so it is not happening as much  Notes his shoulder still "hurts with everything"/active movement/use of L shoulder/UE  Notes cont'd limitations with ROM L shoulder with pain  Cont'd weakness L shoulder with pain  Cont'd limited functional use of L shoulder/UE due to ongoing pain/weakness  Quality of life: good    Pain  Current pain ratin  At best pain ratin (sometimes at rest- rare)  At worst pain ratin  Location: L shoulder  Quality: dull ache, sharp, radiating and tight (catching)  Relieving factors: rest, support, medications and ice  Progression: improved    Social Support  Steps to enter house: yes  2  Stairs in house: no   L[facility] in: [facility]  Lives with: spouse    Employment status: not working ([employer])  Hand dominance: right    Treatments  Previous treatment: physical therapy, injection treatment and medication  Current treatment: medication and physical therapy  Current treatment comments: s/p RTC repair       Patient Goals  Patient goals for therapy: decreased pain, increased motion, increased strength, independence with ADLs/IADLs, return to sport/leisure activities and return to work          Objective     Postural Observations  Seated posture: fair  Standing posture: fair    Additional Postural Observation Details  Forward head/rounded shoulders  Increased thoracic kyphosis   B scapular depression and protraction with mild winging     L scapular depression and winging mildly more noticeable L vs R - improving/more symmetrical           Observations   Left Shoulder   Positive for incision  Negative for edema  Additional Observation Details    "Cyst" noted L superior medial shoulder/UT region; pt notes this is chronic   Incisions healed/closed L shoulder         Tenderness     Additional Tenderness Details  Diffuse TTP about L shoulder 2* post op status   Continues with diffuse ttp about L anterior shoulder region   Will monitor     Cervical/Thoracic Screen   Cervical range of motion within normal limits with the following exceptions: Grossly WFL without pain/sx  Mild tightness L UT      Neurological Testing     Sensation     Shoulder   Left Shoulder   Intact: light touch    Right Shoulder   Intact: light touch    Additional Neurological Details  Sensation intact to light touch except L thumb with cont'd n/t- not noted at RE   Will monitor     Active Range of Motion   Left Shoulder   Flexion: 128 degrees with pain  Abduction: 133 degrees with pain  External rotation 0°: Left shoulder active external rotation at 0 degrees: ~20-30* beyond neutral  with pain  External rotation BTH: C6 with pain  Internal rotation 0°: Left shoulder active internal rotation at 0 degrees: to body     Internal rotation BTB: T11 with pain    Additional Active Range of Motion Details  Pt notes he has been having some R shoulder pain- no trauma - continues     Pain and apprehension with all L shoulder AROM   Mild scapular elevation with L shoulder elevation approaching end ranges; improved with cues/feedback to correct - improving overall     Stiffness and pain with L shoulder ER AROM with arm at side per pt; feels a hard block "like it just wont go any further than this and it hurts" ; gradually improving    L elbow AROM flex/extension WNL without pain/sx     L wrist/hand AROM WNL     AAROM = to or less than AROM with shoulder elevation with pain and c/o "catching" L shoulder with lowering L arm - continues intermittently       Passive Range of Motion   Left Shoulder   Flexion: 150 degrees with pain  Abduction: 155 degrees with pain  External rotation 45°: 60 degrees with pain  Internal rotation 45°: 70 degrees with pain    Additional Passive Range of Motion Details  Continues with limited end range motion all planes with PROM L GHJ however all movements improved from last RE  Stiff end feels all planes with limited pt tolerance for end range stretching- improving tolerance as of late ; especially stiff into ER with mm guarding and apprehension; Stiffness end range flexion and abduction as well with pain however end range is increasing each session gradually ; IR not limited by pain with stiff end feel with creep     L elbow AROM/PROM WNL         Joint Play     Additional Joint Play Details  Gross hypomobility L GHJ as noted above     Strength/Myotome Testing     Left Shoulder     Planes of Motion   Flexion: 4-   Abduction: 4+   External rotation at 0°: 4   Internal rotation at 0°: 4+     Isolated Muscles   Biceps: 4+   Upper trapezius: 4+     Additional Strength Details  MMT assessed with break test with arm at side to pt tolerance  Pain with all testing, most painful with resisted flexion > IR   Continues to generally report flexion of L shoulder is more painful than abduction       L wrist grossly 4+/5  L hand grossly 4+/5    L elbow 4+/5 flexion without pain/sx; extension 4+/5 with shoulder pain                Precautions: s/p L RTC repair, NO AROM X 6 weeks       Re-eval Date: 11/2    Date 9/20       Visit Count 36       FOTO NV       Pain In 1-2/10       Pain Out 2-3/10             Manuals 9/20       PROM R shoulder   GENTLE    PROM L shoulder supine to tolerance,  end range stretching as tolerated all planes to restore full PROM   15'        Dressing change                  Rhythmic stabilization  Resume        Neuro Re-Ed                                                                Ther Ex        UT stretch    Levator stretch        C-spine AROM    Elbow AAROM MTP/LTP  blue 3x10/3-5" Wrist AROM UBE 80 RPM 10' alt        indv     composite  SL ER  3x10/3-5" 4#  SL flex  3x10/3-5" 3#       Pendulums f/b, s/s  Wall push ups  2x10    Standing PNF  2x10ea  D1/2 flex /ext       scap retract tband IR/ER blue  3x10/3-5"         Pulleys AROM flex/abduction         Table slides Prone rows  4# 3x10    Prone ext   4# 3x10/3-5"    Prone shoulder flexion 3#  3x10    Prone horiz shoulder abd 3#  3x10       Ther Activity                        Gait Training                        Modalities CP 10' no adverse effects

## 2021-09-23 ENCOUNTER — OFFICE VISIT (OUTPATIENT)
Dept: PHYSICAL THERAPY | Facility: CLINIC | Age: 41
End: 2021-09-23
Payer: OTHER MISCELLANEOUS

## 2021-09-23 DIAGNOSIS — M24.812 INTERNAL DERANGEMENT OF SHOULDER, LEFT: Primary | ICD-10-CM

## 2021-09-23 PROCEDURE — 97140 MANUAL THERAPY 1/> REGIONS: CPT

## 2021-09-23 PROCEDURE — 97110 THERAPEUTIC EXERCISES: CPT

## 2021-09-23 NOTE — PROGRESS NOTES
Daily Note     Today's date: 2021  Patient name: Benjamin Engel  : 1980  MRN: [id number]  Referring provider: Asaf Medina  Dx:   Encounter Diagnosis     ICD-10-CM    1  Internal derangement of shoulder, left  M24 812        Start Time: 830  Stop Time: 925  Total time in clinic (min): 55 minutes    Subjective: "My shoulder is slowly getting better  It does hurt when I move it about shoulder height "      Objective: See treatment diary below      Assessment: Tolerated treatment well  Pt is slowly and steadily improving  Strength deficits noted with PNF flexion  PROM improving with minimal end range pain  Will continue to progress as able to return to PLOF  Patient demonstrated fatigue post treatment and would benefit from continued PT      Plan: Continue per plan of care  Progress treatment as tolerated         Precautions: s/p L RTC repair, NO AROM X 6 weeks       Re-eval Date:     Date       Visit Count 36 37      FOTO NV       Pain In 1-2/10 1/10      Pain Out -3/10 2-3/10            Manuals       PROM R shoulder   GENTLE    PROM L shoulder supine to tolerance,  end range stretching as tolerated all planes to restore full PROM   15'  PROM L shoulder supine to tolerance,  end range stretching as tolerated all planes to restore full PROM   15'       Dressing change                  Rhythmic stabilization  Resume  Rhythmic stabilization  Multi plane 3x 1'      Neuro Re-Ed                                                                Ther Ex        UT stretch    Levator stretch        C-spine AROM    Elbow AAROM MTP/LTP  blue 3x10/3-5" MTP/LTP  blue 3x10/3-5"      Wrist AROM UBE 80 RPM 10' alt UBE 80 RPM 10' alt       indv     composite  SL ER  3x10/3-5" 4#  SL flex  3x10/3-5" 3# SL ER  3x10/3-5" 4#  SL flex  3x10/3-5" 4#      Pendulums f/b, s/s  Wall push ups  2x10    Standing PNF  2x10ea  D1/2 flex /ext Wall push ups  2x10    Standing PNF  2x10ea  D1/2 flex /ext      scap retract tband IR/ER blue  3x10/3-5"   tband IR/ER blue  3x10/3-5"      Pulleys AROM flex/abduction   3x10/3-5"      Table slides Prone rows  4# 3x10    Prone ext   4# 3x10/3-5"    Prone shoulder flexion 3#  3x10    Prone horiz shoulder abd 3#  3x10 Prone rows  4# 3x10    Prone ext   4# 3x10/3-5"    Prone shoulder flexion 3#  3x10    Prone horiz shoulder abd 4#  3x10      Ther Activity                        Gait Training                        Modalities CP 10' no adverse effects CP 10' no adverse effects

## 2021-09-28 ENCOUNTER — OFFICE VISIT (OUTPATIENT)
Dept: PHYSICAL THERAPY | Facility: CLINIC | Age: 41
End: 2021-09-28
Payer: OTHER MISCELLANEOUS

## 2021-09-28 ENCOUNTER — OFFICE VISIT (OUTPATIENT)
Dept: OBGYN CLINIC | Facility: CLINIC | Age: 41
End: 2021-09-28
Payer: OTHER MISCELLANEOUS

## 2021-09-28 VITALS
DIASTOLIC BLOOD PRESSURE: 111 MMHG | HEART RATE: 114 BPM | SYSTOLIC BLOOD PRESSURE: 157 MMHG | BODY MASS INDEX: 36.45 KG/M2 | HEIGHT: 78 IN | WEIGHT: 315 LBS

## 2021-09-28 DIAGNOSIS — M24.812 INTERNAL DERANGEMENT OF SHOULDER, LEFT: Primary | ICD-10-CM

## 2021-09-28 DIAGNOSIS — Z98.890 S/P LEFT ROTATOR CUFF REPAIR: Primary | ICD-10-CM

## 2021-09-28 PROCEDURE — 99213 OFFICE O/P EST LOW 20 MIN: CPT | Performed by: ORTHOPAEDIC SURGERY

## 2021-09-28 PROCEDURE — 97140 MANUAL THERAPY 1/> REGIONS: CPT

## 2021-09-28 PROCEDURE — 97110 THERAPEUTIC EXERCISES: CPT

## 2021-09-28 NOTE — PROGRESS NOTES
Daily Note     Today's date: 2021  Patient name: Lou Correa  : 1980  MRN: [id number]  Referring provider: Linh Sotelo  Dx:   Encounter Diagnosis     ICD-10-CM    1  Internal derangement of shoulder, left  M24 812        Start Time: 1000  Stop Time: 1105  Total time in clinic (min): 65 minutes    Subjective: "I just saw the surgeon and he wants me to continue with therapy  He cranked on my shoulder so it's sore right now "      Objective: See treatment diary below      Assessment: Tolerated treatment well  Pt continues with progress with strength, mobility, and motion  End range pain continues >90*  Challenged with resistant ER wall wipers; able to increase motion with each rep  End range pain continues with PROM with improved available range  Would benefit from continued skilled therapy to increase strength, motion, and mobility to return to PLOF  Patient demonstrated fatigue post treatment and would benefit from continued PT      Plan: Continue per plan of care  Progress treatment as tolerated         Precautions: s/p L RTC repair, NO AROM X 6 weeks       Re-eval Date:     Date      Visit Count 36 37 38     FOTO NV       Pain In 1-2/10 1/10 210     Pain Out 2-3/10 2-3/10 3/10           Manuals      PROM R shoulder   GENTLE    PROM L shoulder supine to tolerance,  end range stretching as tolerated all planes to restore full PROM   15'  PROM L shoulder supine to tolerance,  end range stretching as tolerated all planes to restore full PROM   15'  PROM L shoulder supine to tolerance,  end range stretching as tolerated all planes to restore full PROM   15'      Dressing change                  Rhythmic stabilization  Resume  Rhythmic stabilization  Multi plane 3x 1' Rhythmic stabilization  Multi plane 3x 1'     Neuro Re-Ed                                                                Ther Ex        UT stretch    Levator stretch        C-spine AROM    Elbow AAROM MTP/LTP  blue 3x10/3-5" MTP/LTP  blue 3x10/3-5" Wayne  15# 3x10 ea     Wrist AROM UBE 80 RPM 10' alt UBE 80 RPM 10' alt UBE 80 RPM 10' alt      indv     composite  SL ER  3x10/3-5" 4#  SL flex  3x10/3-5" 3# SL ER  3x10/3-5" 4#  SL flex  3x10/3-5" 4# SL ER  3x10/3-5" 4#  SL flex  3x10/3-5" 4#     Pendulums f/b, s/s  Wall push ups  2x10    Standing PNF  2x10ea  D1/2 flex /ext Wall push ups  2x10    Standing PNF  2x10ea  D1/2 flex /ext Wall push ups   3x10    Standing PNF  2x10ea  D1/2 flex /ex       scap retract tband IR/ER blue  3x10/3-5"   tband IR/ER blue  3x10/3-5" Wall wipers  Red 3x10     Pulleys AROM flex/abduction   3x10/3-5" 1# 3x10/3-5"     Table slides Prone rows  4# 3x10    Prone ext   4# 3x10/3-5"    Prone shoulder flexion 3#  3x10    Prone horiz shoulder abd 3#  3x10 Prone rows  4# 3x10    Prone ext   4# 3x10/3-5"    Prone shoulder flexion 3#  3x10    Prone horiz shoulder abd 4#  3x10 Prone rows  4# 3x10    Prone ext   4# 3x10/3-5"    Prone shoulder flexion 3#  3x10    Prone horiz shoulder abd 4#  3x10     Ther Activity                        Gait Training                        Modalities CP 10' no adverse effects CP 10' no adverse effects CP 10' no adverse effect

## 2021-09-28 NOTE — LETTER
September 28, 2021     Patient: Ivania Izquierdo   YOB: 1980   Date of Visit: 9/28/2021       To Whom it May Concern:    Ludwin Luciano is under my professional care  He was seen in my office on 9/28/2021  He may return to work with limitations Light duty, no suspect contact  If you have any questions or concerns, please don't hesitate to call  Sincerely,          Cisco Valentin DO        CC: Valeri Louis

## 2021-09-28 NOTE — PROGRESS NOTES
Assessment/Plan:    No problem-specific Assessment & Plan notes found for this encounter  Diagnoses and all orders for this visit:    S/P left rotator cuff repair          The patient should continue physical therapy for range of motion, strengthening, and stretching  His arc of motion is improving  Return back in 6 weeks for re-evaluation  He will work with light duty restrictions if it is available  No suspect contact  Subjective:      Patient ID: Adela Taylor is a 39 y o  male  HPI       The patient is 4 months status post rotator cuff repair of his left shoulder  Function, he is doing better  He states his still some pain albeit improved from his preoperative level  He is actively participating in physical therapy  He denies any neck pain  He denies any numbness or tingling  The following portions of the patient's history were reviewed and updated as appropriate: allergies, current medications, past family history, past medical history, past social history, past surgical history and problem list     Review of Systems   Constitutional: Negative for chills, fever and unexpected weight change  HENT: Negative for hearing loss, nosebleeds and sore throat  Eyes: Negative for pain, redness and visual disturbance  Respiratory: Negative for cough, shortness of breath and wheezing  Cardiovascular: Negative for chest pain, palpitations and leg swelling  Gastrointestinal: Negative for abdominal pain, nausea and vomiting  Endocrine: Negative for polydipsia and polyuria  Genitourinary: Negative for dysuria and hematuria  Musculoskeletal: Positive for arthralgias and myalgias  Negative for back pain, gait problem, joint swelling, neck pain and neck stiffness  As noted in HPI   Skin: Negative for rash and wound  Neurological: Negative for dizziness, numbness and headaches  Psychiatric/Behavioral: Negative for decreased concentration and suicidal ideas   The patient is not nervous/anxious  Objective:      BP (!) 157/111 Comment: always marily when he goes to    Pulse (!) 114   Ht 6' 6" (1 981 m)   Wt (!) 144 kg (317 lb)   BMI 36 63 kg/m²          Physical Exam        Neck was soft and supple  There is a negative axial compression test in his neck  Left upper extremity was neurovascular intact  Fingers are pink and mobile  Compartments are soft  Range of motion is left shoulder was 110° of flexion, 110° of abduction, internal rotation to L5  Rotator cuff strength testing is 4 5/5  There is diffuse tenderness along her shoulder, albeit cannot be isolated  No signs of impingement or instability      Neurologically intact

## 2021-09-30 ENCOUNTER — OFFICE VISIT (OUTPATIENT)
Dept: PHYSICAL THERAPY | Facility: CLINIC | Age: 41
End: 2021-09-30
Payer: OTHER MISCELLANEOUS

## 2021-09-30 ENCOUNTER — TELEPHONE (OUTPATIENT)
Dept: OBGYN CLINIC | Facility: OTHER | Age: 41
End: 2021-09-30

## 2021-09-30 DIAGNOSIS — M24.812 INTERNAL DERANGEMENT OF SHOULDER, LEFT: Primary | ICD-10-CM

## 2021-09-30 PROCEDURE — 97140 MANUAL THERAPY 1/> REGIONS: CPT

## 2021-09-30 PROCEDURE — 97110 THERAPEUTIC EXERCISES: CPT

## 2021-09-30 PROCEDURE — 97112 NEUROMUSCULAR REEDUCATION: CPT

## 2021-09-30 NOTE — PROGRESS NOTES
Daily Note     Today's date: 2021  Patient name: Jessica Boyd  : 1980  MRN: [id number]  Referring provider: Alia Santiago  Dx:   Encounter Diagnosis     ICD-10-CM    1  Internal derangement of shoulder, left  M24 812                   Subjective: "I went back to work yesterday and my shoulder isn't as bad as I thought it would be "      Objective: See treatment diary below      Assessment: Tolerated treatment well  Pt is slowing increasing strength and motion  End range pain and strength deficits continues above 90*  Quick muscle fatigue noted rhythmic stabilization and ball on wall  Will continue to progress as able to address strength and motion deficits to return to PLOF  Patient demonstrated fatigue post treatment and would benefit from continued PT      Plan: Continue per plan of care  Progress treatment as tolerated         Precautions: s/p L RTC repair, NO AROM X 6 weeks       Re-eval Date:     Date     Visit Count 36 37 38 39    FOTO NV       Pain In 1-2/10 1/10 2/10 1/10    Pain Out 2-3/10 2-3/10 3/10 1/10          Manuals     PROM R shoulder   GENTLE    PROM L shoulder supine to tolerance,  end range stretching as tolerated all planes to restore full PROM   15'  PROM L shoulder supine to tolerance,  end range stretching as tolerated all planes to restore full PROM   15'  PROM L shoulder supine to tolerance,  end range stretching as tolerated all planes to restore full PROM   15'  PROM L shoulder supine to tolerance,  end range stretching as tolerated all planes to restore full PROM   15'     Dressing change                  Rhythmic stabilization  Resume  Rhythmic stabilization  Multi plane 3x 1' Rhythmic stabilization  Multi plane 3x 1' Rhythmic stabilization  Multi plane 3x 1'    Neuro Re-Ed            Ball on wall  4 ways grn x10 ea                                                    Ther Ex        UT stretch    Levator stretch C-spine AROM    Elbow AAROM MTP/LTP  blue 3x10/3-5" MTP/LTP  blue 3x10/3-5" [place]  15# 3x10 ea [place]  15# 3x10 ea    Wrist AROM UBE 80 RPM 10' alt UBE 80 RPM 10' alt UBE 80 RPM 10' alt UBE 80 RPM 10' alt     indv     composite  SL ER  3x10/3-5" 4#  SL flex  3x10/3-5" 3# SL ER  3x10/3-5" 4#  SL flex  3x10/3-5" 4# SL ER  3x10/3-5" 4#  SL flex  3x10/3-5" 4# SL ER  4# 3x10/3-5"   SL flex  4# 3x10/3-5"    Pendulums f/b, s/s  Wall push ups  2x10    Standing PNF  2x10ea  D1/2 flex /ext Wall push ups  2x10    Standing PNF  2x10ea  D1/2 flex /ext Wall push ups   3x10    Standing PNF  2x10ea  D1/2 flex /ex   Wall push ups   3x10    Standing PNF  2x10ea  D1/2 flex /ex    scap retract tband IR/ER blue  3x10/3-5"   tband IR/ER blue  3x10/3-5" Wall wipers  Red 3x10 Wall wipers  Red 3x10    Pulleys AROM flex/abduction   3x10/3-5" 1# 3x10/3-5" 1# 3x10/3-5"    Table slides Prone rows  4# 3x10    Prone ext   4# 3x10/3-5"    Prone shoulder flexion 3#  3x10    Prone horiz shoulder abd 3#  3x10 Prone rows  4# 3x10    Prone ext   4# 3x10/3-5"    Prone shoulder flexion 3#  3x10    Prone horiz shoulder abd 4#  3x10 Prone rows  4# 3x10    Prone ext   4# 3x10/3-5"    Prone shoulder flexion 3#  3x10    Prone horiz shoulder abd 4#  3x10 Prone rows  4# 3x10    Prone ext   4# 3x10/3-5"    Prone shoulder flexion 4#  3x10    Prone horiz shoulder abd 4#  3x10    Ther Activity                        Gait Training                        Modalities CP 10' no adverse effects CP 10' no adverse effects CP 10' no adverse effect CP 10' no adverse effect

## 2021-10-05 ENCOUNTER — TELEPHONE (OUTPATIENT)
Dept: OBGYN CLINIC | Facility: HOSPITAL | Age: 41
End: 2021-10-05

## 2021-10-06 ENCOUNTER — OFFICE VISIT (OUTPATIENT)
Dept: PHYSICAL THERAPY | Facility: CLINIC | Age: 41
End: 2021-10-06
Payer: OTHER MISCELLANEOUS

## 2021-10-06 DIAGNOSIS — M24.812 INTERNAL DERANGEMENT OF SHOULDER, LEFT: Primary | ICD-10-CM

## 2021-10-06 PROCEDURE — 97110 THERAPEUTIC EXERCISES: CPT

## 2021-10-06 PROCEDURE — 97112 NEUROMUSCULAR REEDUCATION: CPT

## 2021-10-06 PROCEDURE — 97140 MANUAL THERAPY 1/> REGIONS: CPT

## 2021-10-07 ENCOUNTER — OFFICE VISIT (OUTPATIENT)
Dept: PHYSICAL THERAPY | Facility: CLINIC | Age: 41
End: 2021-10-07
Payer: OTHER MISCELLANEOUS

## 2021-10-07 DIAGNOSIS — M24.812 INTERNAL DERANGEMENT OF SHOULDER, LEFT: Primary | ICD-10-CM

## 2021-10-07 PROCEDURE — 97110 THERAPEUTIC EXERCISES: CPT

## 2021-10-07 PROCEDURE — 97140 MANUAL THERAPY 1/> REGIONS: CPT

## 2021-10-07 PROCEDURE — 97112 NEUROMUSCULAR REEDUCATION: CPT

## 2021-10-12 ENCOUNTER — OFFICE VISIT (OUTPATIENT)
Dept: PHYSICAL THERAPY | Facility: CLINIC | Age: 41
End: 2021-10-12
Payer: OTHER MISCELLANEOUS

## 2021-10-12 DIAGNOSIS — M24.812 INTERNAL DERANGEMENT OF SHOULDER, LEFT: Primary | ICD-10-CM

## 2021-10-12 PROCEDURE — 97110 THERAPEUTIC EXERCISES: CPT

## 2021-10-12 PROCEDURE — 97140 MANUAL THERAPY 1/> REGIONS: CPT

## 2021-10-12 PROCEDURE — 97112 NEUROMUSCULAR REEDUCATION: CPT

## 2021-10-14 ENCOUNTER — OFFICE VISIT (OUTPATIENT)
Dept: PHYSICAL THERAPY | Facility: CLINIC | Age: 41
End: 2021-10-14
Payer: OTHER MISCELLANEOUS

## 2021-10-14 DIAGNOSIS — M24.812 INTERNAL DERANGEMENT OF SHOULDER, LEFT: Primary | ICD-10-CM

## 2021-10-14 PROCEDURE — 97140 MANUAL THERAPY 1/> REGIONS: CPT

## 2021-10-14 PROCEDURE — 97110 THERAPEUTIC EXERCISES: CPT

## 2021-10-14 PROCEDURE — 97112 NEUROMUSCULAR REEDUCATION: CPT

## 2021-10-19 ENCOUNTER — APPOINTMENT (OUTPATIENT)
Dept: PHYSICAL THERAPY | Facility: CLINIC | Age: 41
End: 2021-10-19
Payer: OTHER MISCELLANEOUS

## 2021-10-21 ENCOUNTER — APPOINTMENT (OUTPATIENT)
Dept: PHYSICAL THERAPY | Facility: CLINIC | Age: 41
End: 2021-10-21
Payer: OTHER MISCELLANEOUS

## 2021-10-26 ENCOUNTER — EVALUATION (OUTPATIENT)
Dept: PHYSICAL THERAPY | Facility: CLINIC | Age: 41
End: 2021-10-26
Payer: OTHER MISCELLANEOUS

## 2021-10-26 DIAGNOSIS — M24.812 INTERNAL DERANGEMENT OF SHOULDER, LEFT: Primary | ICD-10-CM

## 2021-10-26 PROCEDURE — 97140 MANUAL THERAPY 1/> REGIONS: CPT | Performed by: PHYSICAL MEDICINE & REHABILITATION

## 2021-10-26 PROCEDURE — 97110 THERAPEUTIC EXERCISES: CPT | Performed by: PHYSICAL MEDICINE & REHABILITATION

## 2021-10-28 ENCOUNTER — OFFICE VISIT (OUTPATIENT)
Dept: PHYSICAL THERAPY | Facility: CLINIC | Age: 41
End: 2021-10-28
Payer: OTHER MISCELLANEOUS

## 2021-10-28 DIAGNOSIS — M24.812 INTERNAL DERANGEMENT OF SHOULDER, LEFT: Primary | ICD-10-CM

## 2021-10-28 PROCEDURE — 97140 MANUAL THERAPY 1/> REGIONS: CPT

## 2021-10-28 PROCEDURE — 97110 THERAPEUTIC EXERCISES: CPT

## 2021-11-02 ENCOUNTER — OFFICE VISIT (OUTPATIENT)
Dept: PHYSICAL THERAPY | Facility: CLINIC | Age: 41
End: 2021-11-02
Payer: OTHER MISCELLANEOUS

## 2021-11-02 DIAGNOSIS — M24.812 INTERNAL DERANGEMENT OF SHOULDER, LEFT: Primary | ICD-10-CM

## 2021-11-02 PROCEDURE — 97110 THERAPEUTIC EXERCISES: CPT

## 2021-11-02 PROCEDURE — 97140 MANUAL THERAPY 1/> REGIONS: CPT

## 2021-11-04 ENCOUNTER — OFFICE VISIT (OUTPATIENT)
Dept: PHYSICAL THERAPY | Facility: CLINIC | Age: 41
End: 2021-11-04
Payer: OTHER MISCELLANEOUS

## 2021-11-04 DIAGNOSIS — M24.812 INTERNAL DERANGEMENT OF SHOULDER, LEFT: Primary | ICD-10-CM

## 2021-11-04 PROCEDURE — 97110 THERAPEUTIC EXERCISES: CPT

## 2021-11-04 PROCEDURE — 97112 NEUROMUSCULAR REEDUCATION: CPT

## 2021-11-04 PROCEDURE — 97140 MANUAL THERAPY 1/> REGIONS: CPT

## 2021-11-09 ENCOUNTER — OFFICE VISIT (OUTPATIENT)
Dept: OBGYN CLINIC | Facility: CLINIC | Age: 41
End: 2021-11-09
Payer: OTHER MISCELLANEOUS

## 2021-11-09 ENCOUNTER — OFFICE VISIT (OUTPATIENT)
Dept: PHYSICAL THERAPY | Facility: CLINIC | Age: 41
End: 2021-11-09
Payer: OTHER MISCELLANEOUS

## 2021-11-09 VITALS
BODY MASS INDEX: 36.45 KG/M2 | HEART RATE: 90 BPM | DIASTOLIC BLOOD PRESSURE: 103 MMHG | HEIGHT: 78 IN | WEIGHT: 315 LBS | SYSTOLIC BLOOD PRESSURE: 148 MMHG

## 2021-11-09 DIAGNOSIS — Z98.890 S/P LEFT ROTATOR CUFF REPAIR: Primary | ICD-10-CM

## 2021-11-09 DIAGNOSIS — M24.812 INTERNAL DERANGEMENT OF SHOULDER, LEFT: Primary | ICD-10-CM

## 2021-11-09 PROCEDURE — 97110 THERAPEUTIC EXERCISES: CPT

## 2021-11-09 PROCEDURE — 97112 NEUROMUSCULAR REEDUCATION: CPT

## 2021-11-09 PROCEDURE — 97140 MANUAL THERAPY 1/> REGIONS: CPT

## 2021-11-09 PROCEDURE — 99213 OFFICE O/P EST LOW 20 MIN: CPT | Performed by: ORTHOPAEDIC SURGERY

## 2021-11-09 RX ORDER — DIAZEPAM 5 MG/1
5 TABLET ORAL EVERY 6 HOURS PRN
Qty: 1 TABLET | Refills: 0 | Status: SHIPPED | OUTPATIENT
Start: 2021-11-09 | End: 2021-12-06 | Stop reason: SDUPTHER

## 2021-11-12 ENCOUNTER — OFFICE VISIT (OUTPATIENT)
Dept: PHYSICAL THERAPY | Facility: CLINIC | Age: 41
End: 2021-11-12
Payer: OTHER MISCELLANEOUS

## 2021-11-12 DIAGNOSIS — M24.812 INTERNAL DERANGEMENT OF SHOULDER, LEFT: Primary | ICD-10-CM

## 2021-11-12 PROCEDURE — 97140 MANUAL THERAPY 1/> REGIONS: CPT | Performed by: PHYSICAL MEDICINE & REHABILITATION

## 2021-11-12 PROCEDURE — 97110 THERAPEUTIC EXERCISES: CPT | Performed by: PHYSICAL MEDICINE & REHABILITATION

## 2021-11-16 ENCOUNTER — APPOINTMENT (OUTPATIENT)
Dept: PHYSICAL THERAPY | Facility: CLINIC | Age: 41
End: 2021-11-16
Payer: OTHER MISCELLANEOUS

## 2021-11-18 ENCOUNTER — APPOINTMENT (OUTPATIENT)
Dept: PHYSICAL THERAPY | Facility: CLINIC | Age: 41
End: 2021-11-18
Payer: OTHER MISCELLANEOUS

## 2021-11-22 ENCOUNTER — APPOINTMENT (OUTPATIENT)
Dept: PHYSICAL THERAPY | Facility: CLINIC | Age: 41
End: 2021-11-22
Payer: OTHER MISCELLANEOUS

## 2021-11-24 ENCOUNTER — APPOINTMENT (OUTPATIENT)
Dept: PHYSICAL THERAPY | Facility: CLINIC | Age: 41
End: 2021-11-24
Payer: OTHER MISCELLANEOUS

## 2021-11-30 ENCOUNTER — APPOINTMENT (OUTPATIENT)
Dept: PHYSICAL THERAPY | Facility: CLINIC | Age: 41
End: 2021-11-30
Payer: OTHER MISCELLANEOUS

## 2021-12-02 ENCOUNTER — TELEPHONE (OUTPATIENT)
Dept: INTERVENTIONAL RADIOLOGY/VASCULAR | Facility: HOSPITAL | Age: 41
End: 2021-12-02

## 2021-12-02 ENCOUNTER — APPOINTMENT (OUTPATIENT)
Dept: PHYSICAL THERAPY | Facility: CLINIC | Age: 41
End: 2021-12-02
Payer: OTHER MISCELLANEOUS

## 2021-12-06 ENCOUNTER — HOSPITAL ENCOUNTER (OUTPATIENT)
Dept: MRI IMAGING | Facility: HOSPITAL | Age: 41
Discharge: HOME | End: 2021-12-06
Attending: ORTHOPAEDIC SURGERY

## 2021-12-06 ENCOUNTER — HOSPITAL ENCOUNTER (OUTPATIENT)
Dept: RADIOLOGY | Facility: HOSPITAL | Age: 41
Discharge: HOME | End: 2021-12-06
Attending: ORTHOPAEDIC SURGERY

## 2021-12-06 DIAGNOSIS — Z98.890 S/P LEFT ROTATOR CUFF REPAIR: ICD-10-CM

## 2021-12-09 ENCOUNTER — HOSPITAL ENCOUNTER (OUTPATIENT)
Dept: RADIOLOGY | Facility: HOSPITAL | Age: 41
Discharge: HOME | End: 2021-12-09
Attending: ORTHOPAEDIC SURGERY

## 2021-12-20 ENCOUNTER — OFFICE VISIT (OUTPATIENT)
Dept: OBGYN CLINIC | Facility: OTHER | Age: 41
End: 2021-12-20
Payer: OTHER MISCELLANEOUS

## 2021-12-20 VITALS
DIASTOLIC BLOOD PRESSURE: 99 MMHG | SYSTOLIC BLOOD PRESSURE: 161 MMHG | WEIGHT: 315 LBS | BODY MASS INDEX: 36.45 KG/M2 | HEIGHT: 78 IN | HEART RATE: 124 BPM

## 2021-12-20 DIAGNOSIS — M75.02 ADHESIVE CAPSULITIS OF LEFT SHOULDER: Primary | ICD-10-CM

## 2021-12-20 PROCEDURE — 99214 OFFICE O/P EST MOD 30 MIN: CPT | Performed by: ORTHOPAEDIC SURGERY

## 2021-12-20 PROCEDURE — 20610 DRAIN/INJ JOINT/BURSA W/O US: CPT | Performed by: ORTHOPAEDIC SURGERY

## 2021-12-20 RX ORDER — BUPIVACAINE HYDROCHLORIDE 2.5 MG/ML
2 INJECTION, SOLUTION INFILTRATION; PERINEURAL
Status: COMPLETED | OUTPATIENT
Start: 2021-12-20 | End: 2021-12-20

## 2021-12-20 RX ORDER — BETAMETHASONE SODIUM PHOSPHATE AND BETAMETHASONE ACETATE 3; 3 MG/ML; MG/ML
6 INJECTION, SUSPENSION INTRA-ARTICULAR; INTRALESIONAL; INTRAMUSCULAR; SOFT TISSUE
Status: COMPLETED | OUTPATIENT
Start: 2021-12-20 | End: 2021-12-20

## 2021-12-20 RX ADMIN — BUPIVACAINE HYDROCHLORIDE 2 ML: 2.5 INJECTION, SOLUTION INFILTRATION; PERINEURAL at 13:32

## 2021-12-20 RX ADMIN — BETAMETHASONE SODIUM PHOSPHATE AND BETAMETHASONE ACETATE 6 MG: 3; 3 INJECTION, SUSPENSION INTRA-ARTICULAR; INTRALESIONAL; INTRAMUSCULAR; SOFT TISSUE at 13:32

## 2021-12-22 NOTE — PROGRESS NOTES
Daily Note     Today's date: 2021  Patient name: Luis Fernando Hidalgo  : 1980  MRN: [id number]  Referring provider: Shashi Ruggiero  Dx:   Encounter Diagnosis     ICD-10-CM    1  Internal derangement of shoulder, left  M24 812        Start Time: 1330  Stop Time: 1425  Total time in clinic (min): 55 minutes    Subjective: "I saw the surgeon this morning so my shoulder is sore him moving it around "      Objective: See treatment diary below      Assessment: Tolerated treatment well  Able to increase reps without incident  Good appropriate PROM in all directions  Will continue to progress as able when able  Patient demonstrated fatigue post treatment and would benefit from continued PT      Plan: Continue per plan of care  Progress treatment as tolerated         Precautions: s/p L RTC repair, NO AROM X 6 weeks       Re-eval Date:     Date     Visit Count 1 2 3 4    FOTO     Pain In 0/10 2-3 5-6 4-5    Pain Out 0/10 2-3 3-4 3-4          Manuals     PROM R shoulder   GENTLE     PROM R shoulder   GENTLE  10' PROM R shoulder   GENTLE  10' PROM R shoulder   GENTLE  10'    Dressing change  10' total  Post op dressing removal /change                        Neuro Re-Ed                                                                Ther Ex        UT stretch    Levator stretch Reviewed      reviewed 4x30"      4x30" 4x30"      4x30"   4x30"      4x30"      C-spine AROM    Elbow AAROM Reviewed       reviewed 10x      2x10 10x      3x10   10x      3x10      Wrist AROM Reviewed  2x10 2x10 2x10     indv     composite  Reviewed iso  Blue 2'      Blue 2' Blue 2'      Blue 2' Blue 2'      Blue 2'    Pendulums f/b, s/s  NV Fwd/lat  1' ea Fwd/lat  2' ea Fwd/lat  2' ea    scap retract  2x10/3-5" 2x10/3-5" 3x10/3-5"                    Ther Activity                        Gait Training                        Modalities Deferred to home  CP 10' no adverse effects  CP 10' no adverse effects CP 10' no adverse effects none

## 2021-12-30 ENCOUNTER — EVALUATION (OUTPATIENT)
Dept: PHYSICAL THERAPY | Facility: CLINIC | Age: 41
End: 2021-12-30
Payer: OTHER MISCELLANEOUS

## 2021-12-30 DIAGNOSIS — M75.02 ADHESIVE CAPSULITIS OF LEFT SHOULDER: Primary | ICD-10-CM

## 2021-12-30 PROCEDURE — 97164 PT RE-EVAL EST PLAN CARE: CPT | Performed by: PHYSICAL MEDICINE & REHABILITATION

## 2021-12-30 PROCEDURE — 97140 MANUAL THERAPY 1/> REGIONS: CPT | Performed by: PHYSICAL MEDICINE & REHABILITATION

## 2021-12-30 PROCEDURE — 97110 THERAPEUTIC EXERCISES: CPT | Performed by: PHYSICAL MEDICINE & REHABILITATION

## 2022-01-03 ENCOUNTER — OFFICE VISIT (OUTPATIENT)
Dept: PHYSICAL THERAPY | Facility: CLINIC | Age: 42
End: 2022-01-03
Payer: OTHER MISCELLANEOUS

## 2022-01-03 DIAGNOSIS — M75.02 ADHESIVE CAPSULITIS OF LEFT SHOULDER: Primary | ICD-10-CM

## 2022-01-03 PROCEDURE — 97110 THERAPEUTIC EXERCISES: CPT | Performed by: PHYSICAL MEDICINE & REHABILITATION

## 2022-01-03 PROCEDURE — 97140 MANUAL THERAPY 1/> REGIONS: CPT | Performed by: PHYSICAL MEDICINE & REHABILITATION

## 2022-01-03 NOTE — PROGRESS NOTES
Daily Note     Today's date: 1/3/2022  Patient name: Carissa Moore  : 1980  MRN: [id number]  Referring provider: Keya Johnson  Dx:   Encounter Diagnosis     ICD-10-CM    1  Adhesive capsulitis of left shoulder  M75 02                   Subjective: Pt with no new sx/complaints  Notes compliance with self stretching  Pain is about the same L shoulder  Objective: See treatment diary below      Assessment: Tolerated treatment well  Cont to focus majority of session on PROM/AAROM L shoulder to pt tolerance  PROM L GHJ gradually improving; improved pt relaxation/reduced mm guarding with creep noted at end ranges of abduction and flexion  ER gradually improving  Cont with intermittent "popping" L GHJ with PROM; no increased pain per pt; pt calls these "rice crispies" in his shoulder  Improvement noted in AAROM finger ladder after MT this date  Improvement noted in L shoulder IR and elevation after gentle GHJ mobs; cont with limited capsular mobility all planes  No complaints after tx  Reviewed stretching/ROM for HEP  Added B pec stretch 2* noted anterior tightness R/L shoulder complex  Patient demonstrated fatigue post treatment and would benefit from continued PT      Plan: Continue per plan of care  Progress treatment as tolerated           Precautions: s/p L RTC repair, NO AROM X 6 weeks       Re-eval Date: 2/10    Date 12/30 1/3      Visit Count 1 2      FOTO        Pain In 0/10 1-2/10      Pain Out 2-3/10 1-2/10             Manuals 12/30 1/3      PROM L shoulder   GENTLE , inferior glides L shoulder to tolerance, posterior glides L shoulder to tolerance    Supine  15' total   Grade 2-3 mobs as inder    Supine  20' total   Grade 2-3 mobs as inder                                 Neuro Re-Ed                                                                Ther Ex        Ube warm up  Alt 90rpm  10'  10'       Posterior capsule stretch      Self L shoulder caudal glide 10x10-15"          10x10-15" 10x/10-15"           10x 10-15"      Pulleys flexion/scaption 3' ea NP not available       Finger ladder flexion/scaption 10x/10"ea  10x10" ea       Wall slides vs table slides flexion/abduction      Supine AAROM shoulder flexion Reviewed              reviewed                10x10-15"      Sleeper stretch      Supine 90/90 self ER stretch  10x10-15"        10x/10-15" HEP        10x10-15"                Pec stretch doorway    10x 10-15" B      Ther Activity                        Gait Training                        Modalities CP 10' no adverse effects CP 10' no adverse effects

## 2022-01-06 ENCOUNTER — OFFICE VISIT (OUTPATIENT)
Dept: PHYSICAL THERAPY | Facility: CLINIC | Age: 42
End: 2022-01-06
Payer: OTHER MISCELLANEOUS

## 2022-01-06 DIAGNOSIS — M75.02 ADHESIVE CAPSULITIS OF LEFT SHOULDER: Primary | ICD-10-CM

## 2022-01-06 PROCEDURE — 97140 MANUAL THERAPY 1/> REGIONS: CPT

## 2022-01-06 PROCEDURE — 97110 THERAPEUTIC EXERCISES: CPT

## 2022-01-06 NOTE — PROGRESS NOTES
Daily Note     Today's date: 2022  Patient name: Raymond Thomas  : 1980  MRN: [id number]  Referring provider: Leopold Barker  Dx:   Encounter Diagnosis     ICD-10-CM    1  Adhesive capsulitis of left shoulder  M75 02        Start Time: 226  Stop Time:   Total time in clinic (min): 65 minutes    Subjective: "I am feeling better since the injection but it still doesn't more good  I try stretching as much as I could but it hurts "      Objective: See treatment diary below      Assessment: Tolerated treatment well  Pt complete stretches with good tolerance and execution  Tightness at end range noted  Restrictions at end range with pain in all directions  Will continue to progress as able to address deficits  Patient demonstrated fatigue post treatment and would benefit from continued PT      Plan: Continue per plan of care  Progress treatment as tolerated           Precautions: s/p L RTC repair, NO AROM X 6 weeks       Re-eval Date: 2/10    Date 12/30 1/3 1/6     Visit Count 1 2 3     FOTO        Pain In 0/10 1-2/10 0-1/10     Pain Out 2-3/10 1-2/10             Manuals 12/30 1/3 1/6     PROM L shoulder   GENTLE , inferior glides L shoulder to tolerance, posterior glides L shoulder to tolerance    Supine  15' total   Grade 2-3 mobs as inder    Supine  20' total   Grade 2-3 mobs as inder                                 Neuro Re-Ed                                                                Ther Ex        Ube warm up  Alt 90rpm  10'  10'  10'     Posterior capsule stretch      Self L shoulder caudal glide 10x10-15"          10x10-15" 10x/10-15"           10x 10-15" 10x/10-15"           10x 10-15"     Pulleys flexion/scaption 3' ea NP not available  3' ea     Finger ladder flexion/scaption 10x/10"ea  10x10" ea  10x10" ea      Wall slides vs table slides flexion/abduction      Supine AAROM shoulder flexion Reviewed              reviewed                10x10-15"               10x10-15" Sleeper stretch      Supine 90/90 self ER stretch  10x10-15"        10x/10-15" HEP        10x10-15"         10x10-15"               Pec stretch doorway    10x 10-15" B Pec stretch doorway    10x 10-15" B     Ther Activity                        Gait Training                        Modalities CP 10' no adverse effects CP 10' no adverse effects

## 2022-01-10 ENCOUNTER — OFFICE VISIT (OUTPATIENT)
Dept: PHYSICAL THERAPY | Facility: CLINIC | Age: 42
End: 2022-01-10
Payer: OTHER MISCELLANEOUS

## 2022-01-10 DIAGNOSIS — M75.02 ADHESIVE CAPSULITIS OF LEFT SHOULDER: Primary | ICD-10-CM

## 2022-01-10 PROCEDURE — 97110 THERAPEUTIC EXERCISES: CPT

## 2022-01-10 PROCEDURE — 97140 MANUAL THERAPY 1/> REGIONS: CPT

## 2022-01-10 NOTE — PROGRESS NOTES
Daily Note     Today's date: 1/10/2022  Patient name: Lilly Serrano  : 1980  MRN: [id number]  Referring provider: Jasmin Sanchez  Dx:   Encounter Diagnosis     ICD-10-CM    1  Adhesive capsulitis of left shoulder  M75 02        Start Time: 249  Stop Time: 115  Total time in clinic (min): 65 minutes    Subjective: "My shoulder is more sore this morning "      Objective: See treatment diary below      Assessment: Tolerated treatment well  Pt continues to be limited with A/PROM in all plane with end range pain  Slow improvement noted since last week  Will continue to progress as able to address deficits  Patient demonstrated fatigue post treatment and would benefit from continued PT      Plan: Continue per plan of care  Progress treatment as tolerated           Precautions: s/p L RTC repair, NO AROM X 6 weeks       Re-eval Date: 2/10    Date 12/30 1/3 1/6 1/10    Visit Count 1 2 3 4    FOTO        Pain In 0/10 1-2/10 0-1/10 1-2/10    Pain Out -3/10 1-2/10   1-2/10          Manuals 12/30 1/3 1/6 1/10    PROM L shoulder   GENTLE , inferior glides L shoulder to tolerance, posterior glides L shoulder to tolerance    Supine  15' total   Grade 2-3 mobs as inder    Supine  20' total   Grade 2-3 mobs as inder     Supine  20' total   Grade 2-3 mobs as inder                             Neuro Re-Ed                                                                Ther Ex        Ube warm up  Alt 90rpm  10'  10'  10' 10'    Posterior capsule stretch      Self L shoulder caudal glide 10x10-15"          10x10-15" 10x/10-15"           10x 10-15" 10x/10-15"           10x 10-15" 10x/10-15"           10x 10-15"    Pulleys flexion/scaption 3' ea NP not available  3' ea 3' ea    Finger ladder flexion/scaption 10x/10"ea  10x10" ea  10x10" ea  10x10" ea     Wall slides vs table slides flexion/abduction      Supine AAROM shoulder flexion Reviewed              reviewed                10x10-15"               10x10-15" 10x10-15"    Sleeper stretch      Supine 90/90 self ER stretch  10x10-15"        10x/10-15" HEP        10x10-15"         10x10-15"         10x10-15"              Pec stretch doorway    10x 10-15" B Pec stretch doorway    10x 10-15" B Pec stretch doorway    10x 10-15" B    Ther Activity                        Gait Training                        Modalities CP 10' no adverse effects CP 10' no adverse effects  CP 10' no adverse effects

## 2022-01-13 ENCOUNTER — OFFICE VISIT (OUTPATIENT)
Dept: PHYSICAL THERAPY | Facility: CLINIC | Age: 42
End: 2022-01-13
Payer: OTHER MISCELLANEOUS

## 2022-01-13 DIAGNOSIS — M75.02 ADHESIVE CAPSULITIS OF LEFT SHOULDER: Primary | ICD-10-CM

## 2022-01-13 PROCEDURE — 97140 MANUAL THERAPY 1/> REGIONS: CPT

## 2022-01-13 PROCEDURE — 97110 THERAPEUTIC EXERCISES: CPT

## 2022-01-13 NOTE — PROGRESS NOTES
Daily Note     Today's date: 2022  Patient name: Jimbo Hayes  : 1980  MRN: [id number]  Referring provider: Eloy Juan  Dx:   Encounter Diagnosis     ICD-10-CM    1  Adhesive capsulitis of left shoulder  M75 02        Start Time: 4001  Stop Time: 1150  Total time in clinic (min): 65 minutes    Subjective: "My shoulder isn't bad today  The last injection really helped with movement "      Objective: See treatment diary below      Assessment: Tolerated treatment well  End range self stretching continues to be limited and painful  Improved PROM noted in all planes despite pain  "Pop" in shoulder noted during PROM ER in POS, no increase in pain noted  Will continue to progress as able to address deficits  Patient demonstrated fatigue post treatment and would benefit from continued PT      Plan: Continue per plan of care  Progress treatment as tolerated           Precautions: s/p L RTC repair, NO AROM X 6 weeks       Re-eval Date: 2/10    Date 12/30 1/3 1/6 1/10 1/13   Visit Count 1 2 3 4 5   FOTO        Pain In 0/10 1-2/10 0-1/10 1-2/10 1-2   Pain Out 2-3/10 1-2/10   1-2/10 1-2         Manuals 12/30 1/3 1/6 1/10 1/13   PROM L shoulder   GENTLE , inferior glides L shoulder to tolerance, posterior glides L shoulder to tolerance    Supine  15' total   Grade 2-3 mobs as inder    Supine  20' total   Grade 2-3 mobs as inder     Supine  20' total   Grade 2-3 mobs as inder  Supine  20' total   Grade 2-3 mobs as inder                            Neuro Re-Ed                                                                Ther Ex        Ube warm up  Alt 90rpm  10'  10'  10' 10' 10'   Posterior capsule stretch      Self L shoulder caudal glide 10x10-15"          10x10-15" 10x/10-15"           10x 10-15" 10x/10-15"           10x 10-15" 10x/10-15"           10x 10-15" 10x/10-15"    Pulleys flexion/scaption 3' ea NP not available  3' ea 3' ea unvailable   Finger ladder flexion/scaption 10x/10"ea  10x10" ea 10x10" ea  10x10" ea  10x10" ea    Wall slides vs table slides flexion/abduction      Supine AAROM shoulder flexion Reviewed              reviewed                10x10-15"               10x10-15"               10x10-15"               10x10-15"   Sleeper stretch      Supine 90/90 self ER stretch  10x10-15"        10x/10-15" HEP        10x10-15"         10x10-15"         10x10-15"         10x10-15"             Pec stretch doorway    10x 10-15" B Pec stretch doorway    10x 10-15" B Pec stretch doorway    10x 10-15" B Pec stretch doorway    10x 10-15" B   Ther Activity                        Gait Training                        Modalities CP 10' no adverse effects CP 10' no adverse effects  CP 10' no adverse effects CP 10' no adverse effects

## 2022-01-18 ENCOUNTER — OFFICE VISIT (OUTPATIENT)
Dept: PHYSICAL THERAPY | Facility: CLINIC | Age: 42
End: 2022-01-18
Payer: OTHER MISCELLANEOUS

## 2022-01-18 DIAGNOSIS — M75.02 ADHESIVE CAPSULITIS OF LEFT SHOULDER: Primary | ICD-10-CM

## 2022-01-18 PROCEDURE — 97140 MANUAL THERAPY 1/> REGIONS: CPT

## 2022-01-18 PROCEDURE — 97110 THERAPEUTIC EXERCISES: CPT

## 2022-01-18 NOTE — PROGRESS NOTES
Daily Note     Today's date: 2022  Patient name: Dom Bruno  : 1980  MRN: [id number]  Referring provider: Wang Horan  Dx:   Encounter Diagnosis     ICD-10-CM    1  Adhesive capsulitis of left shoulder  M75 02        Start Time: 0700  Stop Time: 0745  Total time in clinic (min): 45 minutes    Subjective: No new concerns offered by pt  Objective: See treatment diary below      Assessment: Tolerated treatment well  Pt continues with shoulder stiffness and end range pain  Empty end feel noted in all directions  Patient demonstrated fatigue post treatment and would benefit from continued PT      Plan: Continue per plan of care  Progress treatment as tolerated           Precautions: s/p L RTC repair, NO AROM X 6 weeks       Re-eval Date: 2/10    Date        Visit Count 6       FOTO        Pain In 0/10       Pain Out 2-3/10             Manuals        PROM L shoulder   GENTLE , inferior glides L shoulder to tolerance, posterior glides L shoulder to tolerance    Supine  20' total   Grade 2-3 mobs as inder                                  Neuro Re-Ed                                                                Ther Ex        Ube warm up  Alt 90rpm  10'        Posterior capsule stretch      Self L shoulder caudal glide 10x10-15"          10x10-15"       Pulleys flexion/scaption 3' ea       Finger ladder flexion/scaption 10x/10"ea        Wall slides vs table slides flexion/abduction      Supine AAROM shoulder flexion               10x10-15"         Sleeper stretch      Supine 90/90 self ER stretch          10x/10-15"                Pec stretch doorway    10x 10-15" B       Ther Activity                        Gait Training                        Modalities CP 10' no adverse effects

## 2022-01-20 ENCOUNTER — OFFICE VISIT (OUTPATIENT)
Dept: PHYSICAL THERAPY | Facility: CLINIC | Age: 42
End: 2022-01-20
Payer: OTHER MISCELLANEOUS

## 2022-01-20 DIAGNOSIS — M75.02 ADHESIVE CAPSULITIS OF LEFT SHOULDER: Primary | ICD-10-CM

## 2022-01-20 PROCEDURE — 97110 THERAPEUTIC EXERCISES: CPT

## 2022-01-20 PROCEDURE — 97140 MANUAL THERAPY 1/> REGIONS: CPT

## 2022-01-20 NOTE — PROGRESS NOTES
Daily Note     Today's date: 2022  Patient name: Arash Wren  : 1980  MRN: [id number]  Referring provider: Margarita Toribio  Dx:   Encounter Diagnosis     ICD-10-CM    1  Adhesive capsulitis of left shoulder  M75 02        Start Time: 3908  Stop Time: 1145  Total time in clinic (min): 60 minutes    Subjective: "My shoulder doesn't feel as bad today  I don't have that catching that I was getting before "      Objective: See treatment diary below      Assessment: Tolerated treatment well  Slow improves with motion noted in all directions  End range pain continues in all motions however increased motion before discomfort noted  Rotations most painful and restricted  Will continue to progress as able to address deficits  Patient demonstrated fatigue post treatment and would benefit from continued PT      Plan: Continue per plan of care  Progress treatment as tolerated           Precautions: s/p L RTC repair, NO AROM X 6 weeks       Re-eval Date: 2/10    Date       Visit Count 6 7      FOTO        Pain In 0/10 0/10      Pain Out 2-3/10 0/10            Manuals       PROM L shoulder   GENTLE , inferior glides L shoulder to tolerance, posterior glides L shoulder to tolerance    Supine  20' total   Grade 2-3 mobs as inder    PROM L shoulder   GENTLE , inferior glides L shoulder to tolerance, posterior glides L shoulder to tolerance                               Neuro Re-Ed                                                                Ther Ex        Ube warm up  Alt 90rpm  10'  10'       Posterior capsule stretch      Self L shoulder caudal glide 10x10-15"          10x10-15" 10x10-15"      Pulleys flexion/scaption 3' ea 3' ea      Finger ladder flexion/scaption 10x/10"ea  10x/10"ea       Wall slides vs table slides flexion/abduction      Supine AAROM shoulder flexion               10x10-15"                 10x10-15"      Sleeper stretch      Supine 90/90 self ER stretch 10x/10-15"         10x/10-15"               Pec stretch doorway    10x 10-15" B Pec stretch doorway    10x 10-15" B      Ther Activity                        Gait Training                        Modalities CP 10' no adverse effects

## 2022-01-24 ENCOUNTER — OFFICE VISIT (OUTPATIENT)
Dept: PHYSICAL THERAPY | Facility: CLINIC | Age: 42
End: 2022-01-24
Payer: OTHER MISCELLANEOUS

## 2022-01-24 DIAGNOSIS — M75.02 ADHESIVE CAPSULITIS OF LEFT SHOULDER: Primary | ICD-10-CM

## 2022-01-24 PROCEDURE — 97110 THERAPEUTIC EXERCISES: CPT

## 2022-01-24 PROCEDURE — 97140 MANUAL THERAPY 1/> REGIONS: CPT

## 2022-01-24 NOTE — PROGRESS NOTES
Daily Note     Today's date: 2022  Patient name: Richie Aguirre  : 1980  MRN: [id number]  Referring provider: Gigi Morelos  Dx:   Encounter Diagnosis     ICD-10-CM    1  Adhesive capsulitis of left shoulder  M75 02        Start Time:   Stop Time: 1145  Total time in clinic (min): 60 minutes    Subjective: "My shoulder doesn't feel that bad this morning "      Objective: See treatment diary below      Assessment: Tolerated treatment well  Slow and steady gains noted with motion  Able to tolerate increased motion before pain starts  End range tightness noted in all planes  Will continue to progress as able to address deficits  Patient demonstrated fatigue post treatment and would benefit from continued PT      Plan: Continue per plan of care  Progress treatment as tolerated           Precautions: s/p L RTC repair, NO AROM X 6 weeks       Re-eval Date: 2/10    Date      Visit Count 6 7 8     FOTO        Pain In 0/10 0/10      Pain Out 2-3/10 0/10            Manuals      PROM L shoulder   GENTLE , inferior glides L shoulder to tolerance, posterior glides L shoulder to tolerance    Supine  20' total   Grade 2-3 mobs as inder    PROM L shoulder   GENTLE , inferior glides L shoulder to tolerance, posterior glides L shoulder to tolerance                               Neuro Re-Ed                                                                Ther Ex        Ube warm up  Alt 90rpm  10'  10'  70 RPM  10'      Posterior capsule stretch      Self L shoulder caudal glide 10x10-15"          10x10-15" 10x10-15"      Pulleys flexion/scaption 3' ea 3' ea 3' ea     Finger ladder flexion/scaption 10x/10"ea  10x/10"ea  10x/10"ea      Wall slides vs table slides flexion/abduction      Supine AAROM shoulder flexion               10x10-15"                 10x10-15"      Sleeper stretch      Supine 90/90 self ER stretch          10x/10-15"         10x/10-15"               Pec stretch doorway    10x 10-15" B Pec stretch doorway    10x 10-15" B      Ther Activity                        Gait Training                        Modalities CP 10' no adverse effects

## 2022-01-27 ENCOUNTER — OFFICE VISIT (OUTPATIENT)
Dept: PHYSICAL THERAPY | Facility: CLINIC | Age: 42
End: 2022-01-27
Payer: OTHER MISCELLANEOUS

## 2022-01-27 DIAGNOSIS — M75.02 ADHESIVE CAPSULITIS OF LEFT SHOULDER: Primary | ICD-10-CM

## 2022-01-27 PROCEDURE — 97140 MANUAL THERAPY 1/> REGIONS: CPT | Performed by: PHYSICAL MEDICINE & REHABILITATION

## 2022-01-27 PROCEDURE — 97110 THERAPEUTIC EXERCISES: CPT | Performed by: PHYSICAL MEDICINE & REHABILITATION

## 2022-01-27 NOTE — PROGRESS NOTES
Daily Note     Today's date: 2022  Patient name: Isaac Juarez  : 1980  MRN: [id number]  Referring provider: Filiberto Medellin  Dx:   Encounter Diagnosis     ICD-10-CM    1  Adhesive capsulitis of left shoulder  M75 02      Treatment provided by JAKE Cohen under the direct supervision of Ani Saeed DPT  Subjective: Zohaib Tobias reports feeling fine today with continued discomfort with left shoulder AROM  Objective: See treatment diary below      Assessment: Zohaib Tobias tolerated treatment well  He experienced mild to moderate discomfort during manual PROM and posterior glides, but he demonstrated improved AROM and reported less pain with movement  Tiago Toribio would benefit from continued PT to decrease his pain with left shoulder movement and increase his AROM to return to his PLOF at home and work  Plan: Continue per plan of care          Precautions: s/p L RTC repair, NO AROM X 6 weeks       Re-eval Date: 2/10    Date     Visit Count 6 7 8 9    FOTO        Pain In 0/10 0/10  0/10    Pain Out 2-3/10 0/10  0/10          Manuals     PROM L shoulder   GENTLE , inferior glides L shoulder to tolerance, posterior glides L shoulder to tolerance    Supine  20' total   Grade 2-3 mobs as inder    PROM L shoulder   GENTLE , inferior glides L shoulder to tolerance, posterior glides L shoulder to tolerance   PROM L shoulder (FLX/ABD/IR/ER)    Posterior glides to tolerance    10'                              Neuro Re-Ed                                                                Ther Ex        Ube warm up  Alt 90rpm  10'  10'  70 RPM  10'  10'    Posterior capsule stretch      Self L shoulder caudal glide 10x10-15"          10x10-15" 10x10-15"  10x/10-15"          10x/10-15"    Pulleys flexion/scaption 3' ea 3' ea 3' ea 3' each    Finger ladder flexion/scaption 10x/10"ea  10x/10"ea  10x/10"ea  10x/10" ea    Wall slides vs table slides flexion/abduction      Supine AAROM shoulder flexion               10x10-15"                 10x10-15"                10x/10-15"    Sleeper stretch      Supine 90/90 self ER stretch          10x/10-15"         10x/10-15"          10x/10-15"             Pec stretch doorway    10x 10-15" B Pec stretch doorway    10x 10-15" B  Pec Stretch Doorway  10x/10-15" B/L    Ther Activity                        Gait Training                        Modalities CP 10' no adverse effects   CP 10' No Adverse Effects

## 2022-01-31 ENCOUNTER — OFFICE VISIT (OUTPATIENT)
Dept: PHYSICAL THERAPY | Facility: CLINIC | Age: 42
End: 2022-01-31
Payer: OTHER MISCELLANEOUS

## 2022-01-31 DIAGNOSIS — M75.02 ADHESIVE CAPSULITIS OF LEFT SHOULDER: Primary | ICD-10-CM

## 2022-01-31 PROCEDURE — 97110 THERAPEUTIC EXERCISES: CPT

## 2022-01-31 PROCEDURE — 97140 MANUAL THERAPY 1/> REGIONS: CPT

## 2022-01-31 NOTE — PROGRESS NOTES
Daily Note     Today's date: 2022  Patient name: Catarina Crane  : 1980  MRN: [id number]  Referring provider: Shelley Chan  Dx:   Encounter Diagnosis     ICD-10-CM    1  Adhesive capsulitis of left shoulder  M75 02        Start Time: 915  Stop Time:   Total time in clinic (min): 60 minutes    Subjective: "I think my shoulder is moving better  It still hurts in some directions "      Objective: See treatment diary below      Assessment: Tolerated treatment well  Pt continues to improve with stretching program   End range tightness and pain noted in all directions  Occasional 'pop' noted during PROM without increase in pain  Will continue to progress as able to address deficits  Patient demonstrated fatigue post treatment and would benefit from continued PT      Plan: Continue per plan of care  Progress treatment as tolerated           Precautions: s/p L RTC repair, NO AROM X 6 weeks       Re-eval Date: 2/10    Date    Visit Count 6 7 8 9 10   FOTO        Pain In 0/10 0/10  0/10 0   Pain Out 2-3/10 0/10  0/10          Manuals    PROM L shoulder   GENTLE , inferior glides L shoulder to tolerance, posterior glides L shoulder to tolerance    Supine  20' total   Grade 2-3 mobs as inder    PROM L shoulder   GENTLE , inferior glides L shoulder to tolerance, posterior glides L shoulder to tolerance   PROM L shoulder (FLX/ABD/IR/ER)    Posterior glides to tolerance    10'                              Neuro Re-Ed                                                                Ther Ex        Ube warm up  Alt 90rpm  10'  10'  70 RPM  10'  10' 10'   Posterior capsule stretch      Self L shoulder caudal glide 10x10-15"          10x10-15" 10x10-15"  10x/10-15"          10x/10-15" 10x/10-15"   Pulleys flexion/scaption 3' ea 3' ea 3' ea 3' each 3' each   Finger ladder flexion/scaption 10x/10"ea  10x/10"ea  10x/10"ea  10x/10" ea 10x/10" ea   Wall slides vs table slides flexion/abduction      Supine AAROM shoulder flexion               10x10-15"                 10x10-15"                10x/10-15"    Sleeper stretch      Supine 90/90 self ER stretch          10x/10-15"         10x/10-15"          10x/10-15"             Pec stretch doorway    10x 10-15" B Pec stretch doorway    10x 10-15" B  Pec Stretch Doorway  10x/10-15" B/L    Ther Activity                        Gait Training                        Modalities CP 10' no adverse effects   CP 10' No Adverse Effects

## 2022-02-03 ENCOUNTER — OFFICE VISIT (OUTPATIENT)
Dept: PHYSICAL THERAPY | Facility: CLINIC | Age: 42
End: 2022-02-03
Payer: OTHER MISCELLANEOUS

## 2022-02-03 DIAGNOSIS — M75.02 ADHESIVE CAPSULITIS OF LEFT SHOULDER: Primary | ICD-10-CM

## 2022-02-03 PROCEDURE — 97140 MANUAL THERAPY 1/> REGIONS: CPT

## 2022-02-03 PROCEDURE — 97110 THERAPEUTIC EXERCISES: CPT

## 2022-02-03 NOTE — PROGRESS NOTES
Daily Note     Today's date: 2/3/2022  Patient name: Priyanka Paniagua  : 1980  MRN: [id number]  Referring provider: Raymond Ramsey  Dx:   Encounter Diagnosis     ICD-10-CM    1  Adhesive capsulitis of left shoulder  M75 02        Start Time: 1000  Stop Time: 1055  Total time in clinic (min): 55 minutes    Subjective: "I notice I can go through part of the day and not really feel anything in my shoulder "      Objective: See treatment diary below      Assessment: Tolerated treatment well  Pt continues to improve motion with decreased pain actively and passively  Limitations continue especially with rotations  Will continue to progress as able to address deficits  Patient demonstrated fatigue post treatment and would benefit from continued PT      Plan: Continue per plan of care  Progress treatment as tolerated           Precautions: s/p L RTC repair, NO AROM X 6 weeks       Re-eval Date: 2/10    Date 2/3       Visit Count 11       FOTO        Pain In 0/10       Pain Out 2-3/10             Manuals 2/3       PROM L shoulder   GENTLE , inferior glides L shoulder to tolerance, posterior glides L shoulder to tolerance    Supine  20' total PROM L shoulder                                    Neuro Re-Ed                                                                Ther Ex        Ube warm up  Alt 90rpm  10'  70 RPM         Posterior capsule stretch      Self L shoulder caudal glide 10x10-15"          10x10-15"       Pulleys flexion/scaption unavailable 3' ea       Finger ladder flexion/scaption 10x/10"ea        Wall slides vs table slides flexion/abduction      Supine AAROM shoulder flexion               10x10-15"         Sleeper stretch      Supine 90/90 self ER stretch          10x/10-15"                Pec stretch doorway    10x 10-15" B       Ther Activity                        Gait Training                        Modalities CP 10' no adverse effects

## 2022-02-08 ENCOUNTER — OFFICE VISIT (OUTPATIENT)
Dept: PHYSICAL THERAPY | Facility: CLINIC | Age: 42
End: 2022-02-08
Payer: OTHER MISCELLANEOUS

## 2022-02-08 DIAGNOSIS — M75.02 ADHESIVE CAPSULITIS OF LEFT SHOULDER: Primary | ICD-10-CM

## 2022-02-08 PROCEDURE — 97110 THERAPEUTIC EXERCISES: CPT

## 2022-02-08 PROCEDURE — 97140 MANUAL THERAPY 1/> REGIONS: CPT

## 2022-02-08 NOTE — PROGRESS NOTES
Daily Note     Today's date: 2022  Patient name: Isaac Juarez  : 1980  MRN: [id number]  Referring provider: Sinan Lema  Dx:   Encounter Diagnosis     ICD-10-CM    1  Adhesive capsulitis of left shoulder  M75 02        Start Time: 915  Stop Time:   Total time in clinic (min): 55 minutes    Subjective: "my shoulder is feeling better "      Objective: See treatment diary below      Assessment: Tolerated treatment well  Improved motion A/PROM with decreased end range pain  Will continue to to monitor and progress as able  Patient demonstrated fatigue post treatment and would benefit from continued PT      Plan: Continue per plan of care  Progress treatment as tolerated           Precautions: s/p L RTC repair, NO AROM X 6 weeks       Re-eval Date: 2/10    Date 2/3 2/8      Visit Count 11 12      FOTO        Pain In 0/10 0      Pain Out -3/10 2-3            Manuals 2/3       PROM L shoulder   GENTLE , inferior glides L shoulder to tolerance, posterior glides L shoulder to tolerance    Supine  20' total PROM L shoulder      Supine  20' total PROM L shoulder                              Neuro Re-Ed                                                                Ther Ex        Ube warm up  Alt 90rpm  10'  70 RPM   10'  70 RPM       Posterior capsule stretch      Self L shoulder caudal glide 10x10-15"          10x10-15" 10x10-15"          10x10-15"      Pulleys flexion/scaption unavailable 3' ea 3' ea      Finger ladder flexion/scaption 10x/10"ea  10x/10"ea       Wall slides vs table slides flexion/abduction      Supine AAROM shoulder flexion               10x10-15"                 10x10-15"      Sleeper stretch      Supine 90/90 self ER stretch          10x/10-15"         10x/10-15"               Pec stretch doorway    10x 10-15" B Pec stretch doorway    10x 10-15" B      Ther Activity                        Gait Training                        Modalities CP 10' no adverse effects CP 10' no adverse effects

## 2022-02-09 ENCOUNTER — OFFICE VISIT (OUTPATIENT)
Dept: PHYSICAL THERAPY | Facility: CLINIC | Age: 42
End: 2022-02-09
Payer: OTHER MISCELLANEOUS

## 2022-02-09 DIAGNOSIS — M75.02 ADHESIVE CAPSULITIS OF LEFT SHOULDER: Primary | ICD-10-CM

## 2022-02-09 PROCEDURE — 97140 MANUAL THERAPY 1/> REGIONS: CPT

## 2022-02-09 PROCEDURE — 97110 THERAPEUTIC EXERCISES: CPT

## 2022-02-09 NOTE — PROGRESS NOTES
Daily Note     Today's date: 2022  Patient name: Fab Alvarez  : 1980  MRN: [id number]  Referring provider: Eris De La Cruz  Dx:   Encounter Diagnosis     ICD-10-CM    1  Adhesive capsulitis of left shoulder  M75 02        Start Time: 915  Stop Time: 1010  Total time in clinic (min): 55 minutes    Subjective: No specific concerns regarding shoulder "      Objective: See treatment diary below      Assessment: Tolerated treatment well  Motion continues to improve with range and decreased pain  Will continue to monitor and progress as able to reach goals  Patient demonstrated fatigue post treatment and would benefit from continued PT      Plan: Continue per plan of care  Progress treatment as tolerated           Precautions: s/p L RTC repair, NO AROM X 6 weeks       Re-eval Date: 2/10    Date 2/3 2/8 2/9     Visit Count 11 12 13     FOTO        Pain In 010 0 0     Pain Out -3/10 2-3 0           Manuals 2/3  2/9     PROM L shoulder   GENTLE , inferior glides L shoulder to tolerance, posterior glides L shoulder to tolerance    Supine  20' total PROM L shoulder      Supine  20' total PROM L shoulder Supine  20' total PROM L shoulder                             Neuro Re-Ed                                                                Ther Ex        Ube warm up  Alt 90rpm  10'  70 RPM   10'  70 RPM  10'  70 RPM      Posterior capsule stretch      Self L shoulder caudal glide 10x10-15"          10x10-15" 10x10-15"          10x10-15" 10x10-15"          10x10-15"     Pulleys flexion/scaption unavailable 3' ea 3' ea 3' ea     Finger ladder flexion/scaption 10x/10"ea  10x/10"ea  10x/10"ea      Wall slides vs table slides flexion/abduction      Supine AAROM shoulder flexion               10x10-15"                 10x10-15"               10x10-15"     Sleeper stretch      Supine 90/90 self ER stretch          10x/10-15"         10x/10-15"         10x/10-15"              Pec stretch doorway    10x 10-15" B Pec stretch doorway    10x 10-15" B Pec stretch doorway    10x 10-15" B     Ther Activity                        Gait Training                        Modalities CP 10' no adverse effects CP 10' no adverse effects Pec stretch doorway    10x 10-15" B

## 2022-02-10 ENCOUNTER — APPOINTMENT (OUTPATIENT)
Dept: PHYSICAL THERAPY | Facility: CLINIC | Age: 42
End: 2022-02-10
Payer: OTHER MISCELLANEOUS

## 2022-02-15 ENCOUNTER — OFFICE VISIT (OUTPATIENT)
Dept: PHYSICAL THERAPY | Facility: CLINIC | Age: 42
End: 2022-02-15
Payer: OTHER MISCELLANEOUS

## 2022-02-15 DIAGNOSIS — M75.02 ADHESIVE CAPSULITIS OF LEFT SHOULDER: Primary | ICD-10-CM

## 2022-02-15 PROCEDURE — 97140 MANUAL THERAPY 1/> REGIONS: CPT

## 2022-02-15 PROCEDURE — 97110 THERAPEUTIC EXERCISES: CPT

## 2022-02-15 NOTE — PROGRESS NOTES
Daily Note     Today's date: 2/15/2022  Patient name: Lilly Serrano  : 1980  MRN: [id number]  Referring provider: Negra Lamas  Dx:   Encounter Diagnosis     ICD-10-CM    1  Adhesive capsulitis of left shoulder  M75 02        Start Time: 915  Stop Time:   Total time in clinic (min): 55 minutes    Subjective: "My shoulder is less painful but I don't have the strength to get it as high as you do "      Objective: See treatment diary below      Assessment: Tolerated treatment well  Pt progressing well with motion of L shoulder  Able to increase motion with decreased pain  Will continue to progress as able  Patient demonstrated fatigue post treatment and would benefit from continued PT      Plan: Continue per plan of care  Progress treatment as tolerated           Precautions: s/p L RTC repair, NO AROM X 6 weeks       Re-eval Date: 2/10    Date 2/3 2/8 2/9 2/15    Visit Count 11 12 13 14    FOTO        Pain In 010 0 0 0    Pain Out -3/10 2-3 0 0          Manuals 2/3  2/9 2/15    PROM L shoulder   GENTLE , inferior glides L shoulder to tolerance, posterior glides L shoulder to tolerance    Supine  20' total PROM L shoulder      Supine  20' total PROM L shoulder Supine  20' total PROM L shoulder Supine  20' total PROM L shoulder                            Neuro Re-Ed                                                                Ther Ex        Ube warm up  Alt 90rpm  10'  70 RPM   10'  70 RPM  10'  70 RPM   unavail    Posterior capsule stretch      Self L shoulder caudal glide 10x10-15"          10x10-15" 10x10-15"          10x10-15" 10x10-15"          10x10-15" 10x10-15"          10x10-15"    Pulleys flexion/scaption unavailable 3' ea 3' ea 3' ea 3' ea    Finger ladder flexion/scaption 10x/10"ea  10x/10"ea  10x/10"ea  10x/10"ea     Wall slides vs table slides flexion/abduction      Supine AAROM shoulder flexion               10x10-15"                 10x10-15"               10x10-15" 10x10-15"    Sleeper stretch      Supine 90/90 self ER stretch          10x/10-15"         10x/10-15"         10x/10-15"         10x/10-15"             Pec stretch doorway    10x 10-15" B Pec stretch doorway    10x 10-15" B Pec stretch doorway    10x 10-15" B Pec stretch doorway    10x 10-15" B    Ther Activity                        Gait Training                        Modalities CP 10' no adverse effects CP 10' no adverse effects Pec stretch doorway    10x 10-15" B CP 10' no adverse effects

## 2022-02-17 ENCOUNTER — OFFICE VISIT (OUTPATIENT)
Dept: PHYSICAL THERAPY | Facility: CLINIC | Age: 42
End: 2022-02-17
Payer: OTHER MISCELLANEOUS

## 2022-02-17 DIAGNOSIS — M75.02 ADHESIVE CAPSULITIS OF LEFT SHOULDER: Primary | ICD-10-CM

## 2022-02-17 PROCEDURE — 97110 THERAPEUTIC EXERCISES: CPT

## 2022-02-17 PROCEDURE — 97140 MANUAL THERAPY 1/> REGIONS: CPT

## 2022-02-17 NOTE — PROGRESS NOTES
Daily Note     Today's date: 2022  Patient name: Catarina Crane  : 1980  MRN: [id number]  Referring provider: Sydni Granda  Dx:   Encounter Diagnosis     ICD-10-CM    1  Adhesive capsulitis of left shoulder  M75 02        Start Time: 830  Stop Time: 920  Total time in clinic (min): 50 minutes    Subjective: "Nothing new"      Objective: See treatment diary below      Assessment: Tolerated treatment well  Motion continues to improve passively with decreased pain  Strength limiting progression of active movements  Will continue to progress as able to address deficits  Patient demonstrated fatigue post treatment and would benefit from continued PT      Plan: Continue per plan of care  Progress treatment as tolerated           Precautions: s/p L RTC repair, NO AROM X 6 weeks       Re-eval Date: 2/10    Date 2/3 2/8 2/9 2/15 2/17   Visit Count 11 12 13 14 15   FOTO        Pain In 0/10 0 0 0 0   Pain Out 2-3/10 2-3 0 0 0         Manuals 2/3  2/9 2/15 2/17   PROM L shoulder   GENTLE , inferior glides L shoulder to tolerance, posterior glides L shoulder to tolerance    Supine  20' total PROM L shoulder      Supine  20' total PROM L shoulder Supine  20' total PROM L shoulder Supine  20' total PROM L shoulder Supine  20' total PROM L shoulder                           Neuro Re-Ed                                                                Ther Ex        Ube warm up  Alt 90rpm  10'  70 RPM   10'  70 RPM  10'  70 RPM   unavail 10'  70 RPM    Posterior capsule stretch      Self L shoulder caudal glide 10x10-15"          10x10-15" 10x10-15"          10x10-15" 10x10-15"          10x10-15" 10x10-15"          10x10-15"    Pulleys flexion/scaption unavailable 3' ea 3' ea 3' ea 3' ea 3' ea   Finger ladder flexion/scaption 10x/10"ea  10x/10"ea  10x/10"ea  10x/10"ea  10x/10"ea   Wall slides vs table slides flexion/abduction      Supine AAROM shoulder flexion               10x10-15" 10x10-15"               10x10-15"               10x10-15"               10x10-15"   Sleeper stretch      Supine 90/90 self ER stretch          10x/10-15"         10x/10-15"         10x/10-15"         10x/10-15"         10x/10-15"            Pec stretch doorway    10x 10-15" B Pec stretch doorway    10x 10-15" B Pec stretch doorway    10x 10-15" B Pec stretch doorway    10x 10-15" B Pec stretch doorway    10x 10-15" B   Ther Activity                        Gait Training                        Modalities CP 10' no adverse effects CP 10' no adverse effects Pec stretch doorway    10x 10-15" B CP 10' no adverse effects CP 10' no adverse effects

## 2022-02-22 ENCOUNTER — OFFICE VISIT (OUTPATIENT)
Dept: PHYSICAL THERAPY | Facility: CLINIC | Age: 42
End: 2022-02-22
Payer: OTHER MISCELLANEOUS

## 2022-02-22 DIAGNOSIS — M75.02 ADHESIVE CAPSULITIS OF LEFT SHOULDER: Primary | ICD-10-CM

## 2022-02-22 PROCEDURE — 97140 MANUAL THERAPY 1/> REGIONS: CPT

## 2022-02-22 PROCEDURE — 97110 THERAPEUTIC EXERCISES: CPT

## 2022-02-22 NOTE — PROGRESS NOTES
PT Re-Evaluation     Today's date: 2022  Patient name: Lilly Serrano  : 1980   MRN: [id number]  Referring provider: Jasmin Sanchez  Dx:   Encounter Diagnosis     ICD-10-CM    1  Adhesive capsulitis of left shoulder  M75 02                   Assessment  Assessment details: Lilly Serrano is a 39 y o  male returning to outpatient physical therapy with noted impairments including cont'd L shoulder pain, impaired soft tissue mobility, reduced range of motion, reduced strength, reduced postural awareness, and reduced activity tolerance  Signs and symptoms at present are consistent with referring diagnosis of L should adhesive capsulitis s/p RTC repair 2021  He has resumed OPPT 2x/week focusing solely on AAROM and PROM exercises and MT to improve his L shoulder mobility  Over the past several weeks, Ankit Elmore has demonstrated improvement in his L shoulder active and PROM and L shoulder mobility  His pain levels and shoulder sx irritability have also reduced  He notes some improvement in ADLs with improved use of L UE with less pain  He continues with limited end ROM L shoulder/GHJ , especially above 90* elevation  He continues with painful end ranges all planes with L GHJ active and passive ROM  He notes minimal limitations with activities < 90* elevation, however notes cont'd limitations with activities that involve reaching, lifting, carrying at or above 90* shoulder elevation  HEP has been extensively reviewed and encouraged for self ROM/stretching exercises to maintain ROM that has been gained  Pt is I with HEP  Will hold PT x 1-2 weeks for pt to complete HEP  Will see pt in 1-2 weeks for re-eval prior to f/u with MD  Pt in agreement with plan with no questions/concerns end of session     Impairments: abnormal muscle firing, abnormal muscle tone, abnormal or restricted ROM, activity intolerance, impaired physical strength and pain with function    Symptom irritability: highUnderstanding of Dx/Px/POC: good   Prognosis: fair    Goals  STGs to be achieved in 2-4 weeks:  1  Pt to demonstrate reduced subjective pain rating "at worst" by at least 2-3 points from Initial Eval in order to allow for reduced pain with ADLs and improved functional activity tolerance  - met   2  Pt to demonstrate grossly WFL PROM L shoulder without increase in pain/sx  - not met, continue    3  Pt will increase L shoulder AROM elevation to at least 150* without increased pain or compensatory strategies  Met pROM, progressing AROM       LTGs to be achieved in 9-12 weeks:  1  Pt will be I with HEP in order to continue to improve quality of life and independence and reduce risk for re-injury  - met   2  Pt to demonstrate return to ADLs and work without limitations or restrictions  - partially met, limited above 90*   3  Pt to demonstrate improved function as noted by achieving or exceeding predicted score on FOTO outcomes assessment tool  - progressing     Plan  Plan details: Hold PT x 1-2 weeks; pt to cont with HEP  Patient would benefit from: skilled physical therapy  Planned modality interventions: cryotherapy and thermotherapy: hydrocollator packs  Planned therapy interventions: joint mobilization, manual therapy, motor coordination training, neuromuscular re-education, patient education, stretching, therapeutic activities, therapeutic exercise, functional ROM exercises and home exercise program  Frequency: 2x week  Duration in visits: 12  Duration in weeks: 6  Plan of Care beginning date: 2/22/2022  Plan of Care expiration date: 4/5/2022  Treatment plan discussed with: patient        Subjective Evaluation    History of Present Illness  Mechanism of injury: surgery  Mechanism of injury: Pt notes no new sx/complaints  Overall feels he has made progress from [facility]  Notes the pain is less in his shoulder  Feels his ROM has improved with stretching  Notes he can reach higher than before and it is not as painful   Notes he still has pain in the L shoulder  Notes pain is worse with reaching away from his body, especially overhead  Notes no limitations/issues with working below shoulder height  Remains limited with working/reaching/lifting > 90* with pain and reduced strength  Notes his shoulder still "feels like it wants to pop out" at times  RTD in a few weeks  Quality of life: good    Pain  Current pain ratin  At best pain ratin (at rest)  At worst pain ratin  Location: L shoulder  Quality: dull ache, tight and burning (catching, popping)  Relieving factors: rest, support and ice  Progression: improved    Social Support  Steps to enter house: yes  2  Stairs in house: no   Lives in: [facility]  Lives with: spouse    Employment status: not working ([employer])  Hand dominance: right    Treatments  Previous treatment: physical therapy, injection treatment and medication  Current treatment: medication and physical therapy  Current treatment comments: s/p RTC repair  Patient Goals  Patient goals for therapy: decreased pain, increased motion, increased strength, independence with ADLs/IADLs, return to sport/leisure activities and return to work          Objective     Postural Observations  Seated posture: fair  Standing posture: fair    Additional Postural Observation Details  Forward head/rounded shoulders  Increased thoracic kyphosis   B scapular depression and protraction with mild winging     L scapular depression and winging mildly more noticeable L vs R - improving/more symmetrical           Observations   Left Shoulder   Positive for incision  Negative for edema       Additional Observation Details    "Cyst" noted L superior medial shoulder/UT region; pt notes this is chronic   Incisions healed/closed L shoulder         Cervical/Thoracic Screen   Cervical range of motion within normal limits with the following exceptions: Grossly WFL without pain/sx        Neurological Testing     Sensation     Shoulder   Left Shoulder   Intact: light touch    Right Shoulder   Intact: light touch    Additional Neurological Details  Sensation intact to light touch     Active Range of Motion   Left Shoulder   Flexion: 140 degrees with pain  Abduction: 150 degrees with pain  External rotation 0°: Left shoulder active external rotation at 0 degrees: ~20-30* beyond neutral  with pain  External rotation BTH: T1 with pain  Internal rotation 0°: Left shoulder active internal rotation at 0 degrees: to body  Internal rotation BTB: T12 with pain    Additional Active Range of Motion Details  Pain and apprehension approaching end ROM > 90* elevation   Feels L shoulder will "pop out" if he pushes it too far away from his body  Burning pain L GHJ if he "moves it too much"  Pain local to L GHJ  Mild scapular /shoulder elevation with end ROM ;will cont to monitor ; especially noted with abduction     Passive Range of Motion   Left Shoulder   Flexion: 150 degrees with pain  Abduction: 175 degrees with pain  External rotation 45°: 75 degrees with pain  Internal rotation 45°: 70 degrees with pain    Additional Passive Range of Motion Details  Limited end ROM with apprehension/guarding- improved   Mildly stiff end feel with creep; limited by guarding - improved, stiff end feels with creep, limited  By pain   Improved tolerance for gentle stretching /end ROM vs prior PT sessions with manual tx, especially past 2 weeks   Will cont to assess      L elbow AROM/PROM WNL         Joint Play   Left Shoulder  Hypomobile in the posterior capsule and inferior capsule      Additional Joint Play Details  Gross hypomobility L GHJ as noted above - limited by stiffness and mm guarding       Strength/Myotome Testing     Left Shoulder     Planes of Motion   Flexion: 4 (pain)   Abduction: 4   External rotation at 0°: 4+   Internal rotation at 0°: 4+     Isolated Muscles   Biceps: 4+   Upper trapezius: 4+     Additional Strength Details  MMT assessed with break test with arm at side IR/ER and mid range elevation to pt tolerance  Pain with MMT flexion > abduction, most painful with resisted flexion   No pain with IR/ER   Continues to generally report flexion of L shoulder is more painful than abduction       L wrist grossly 4+/5  L hand grossly 4+/5    L elbow 4+/5 flexion without pain/sx; extension 4+/5 with shoulder pain         Flowsheet Rows      Most Recent Value   PT/OT G-Codes    Current Score 69   Projected Score 72

## 2022-02-22 NOTE — PROGRESS NOTES
Daily Note     Today's date: 2022  Patient name: Richie Aguirre  : 1980  MRN: [id number]  Referring provider: Gigi Morelos  Dx:   Encounter Diagnosis     ICD-10-CM    1  Adhesive capsulitis of left shoulder  M75 02        Start Time: 915  Stop Time:   Total time in clinic (min): 55 minutes    Subjective: "I have been stretching at home  It doesn't always feel good but it's getting better "      Objective: See treatment diary below      Assessment: Tolerated treatment well  Motion continues to improve with decreased tightness and pain  Pt to trial stretching at home x2 weeks and return to see home progression  Refer to RE for full details  Will continue as able  Patient demonstrated fatigue post treatment and would benefit from continued PT      Plan: Continue per plan of care  Progress treatment as tolerated           Precautions: s/p L RTC repair, NO AROM X 6 weeks       Re-eval Date: 2/10    Date        Visit Count 16       FOTO        Pain In 0/10       Pain Out 2-3/10             Manuals 2/22  2/9 2/15 2/17   PROM L shoulder   GENTLE , inferior glides L shoulder to tolerance, posterior glides L shoulder to tolerance    Supine  20' total PROM L shoulder      Supine  20' total PROM L shoulder Supine  20' total PROM L shoulder Supine  20' total PROM L shoulder Supine  20' total PROM L shoulder                           Neuro Re-Ed                                                                Ther Ex        Ube warm up  Alt 90rpm  10'  70 RPM   10'  70 RPM  10'  70 RPM   unavail 10'  70 RPM    Posterior capsule stretch      Self L shoulder caudal glide 10x10-15"          10x10-15" 10x10-15"          10x10-15" 10x10-15"          10x10-15" 10x10-15"          10x10-15"    Pulleys flexion/scaption  3' ea       Finger ladder flexion/scaption 10x/10"ea        Wall slides vs table slides flexion/abduction      Supine AAROM shoulder flexion               10x10-15" 10x10-15"               10x10-15"               10x10-15"               10x10-15"   Sleeper stretch      Supine 90/90 self ER stretch          10x/10-15"         10x/10-15"         10x/10-15"         10x/10-15"         10x/10-15"            Pec stretch doorway    10x 10-15" B Pec stretch doorway    10x 10-15" B Pec stretch doorway    10x 10-15" B Pec stretch doorway    10x 10-15" B Pec stretch doorway    10x 10-15" B   Ther Activity                        Gait Training                        Modalities CP 10' no adverse effects CP 10' no adverse effects Pec stretch doorway    10x 10-15" B CP 10' no adverse effects CP 10' no adverse effects

## 2022-02-24 ENCOUNTER — OFFICE VISIT (OUTPATIENT)
Dept: PHYSICAL THERAPY | Facility: CLINIC | Age: 42
End: 2022-02-24
Payer: OTHER MISCELLANEOUS

## 2022-02-24 DIAGNOSIS — M75.02 ADHESIVE CAPSULITIS OF LEFT SHOULDER: Primary | ICD-10-CM

## 2022-02-24 PROCEDURE — 97140 MANUAL THERAPY 1/> REGIONS: CPT

## 2022-02-24 PROCEDURE — 97110 THERAPEUTIC EXERCISES: CPT

## 2022-02-24 NOTE — PROGRESS NOTES
Daily Note     Today's date: 2022  Patient name: José Miguel Claire  : 1980  MRN: [id number]  Referring provider: Elizabeth Ureña  Dx:   Encounter Diagnosis     ICD-10-CM    1  Adhesive capsulitis of left shoulder  M75 02        Start Time: 915  Stop Time: 1010  Total time in clinic (min): 55 minutes    Subjective: Pt offered no specific concerns  Objective: See treatment diary below      Assessment: Tolerated treatment well  Slow and steady improvement noted with motion in all planes  End range tightness continues with rotational pain/discomfort  Pt to continue with home stretches x2 weeks and return for motion check prior to RTD  Patient demonstrated fatigue post treatment and would benefit from continued PT      Plan: Continue per plan of care  Progress treatment as tolerated         Precautions: s/p L RTC repair, NO AROM X 6 weeks       Re-eval Date: 2/10    Date       Visit Count 16 17      FOTO        Pain In 0/10 0/10      Pain Out 2-3/10 0/10            Manuals       PROM L shoulder   GENTLE , inferior glides L shoulder to tolerance, posterior glides L shoulder to tolerance    Supine  20' total PROM L shoulder      Supine  20' total PROM L shoulder                              Neuro Re-Ed                                                                Ther Ex        Ube warm up  Alt 90rpm  10'  70 RPM   10'  70 RPM       Posterior capsule stretch      Self L shoulder caudal glide 10x10-15"          10x10-15" 10x10-15"          10x10-15"      Pulleys flexion/scaption  3' ea 3' ea      Finger ladder flexion/scaption 10x/10"ea  10x/10"ea       Wall slides vs table slides flexion/abduction      Supine AAROM shoulder flexion               10x10-15"                 10x10-15"      Sleeper stretch      Supine 90/90 self ER stretch          10x/10-15"         10x/10-15"               Pec stretch doorway    10x 10-15" B Pec stretch doorway    10x 10-15" B      Ther Activity                        Gait Training                        Modalities CP 10' no adverse effects CP 10' no adverse effects

## 2022-03-15 ENCOUNTER — EVALUATION (OUTPATIENT)
Dept: PHYSICAL THERAPY | Facility: CLINIC | Age: 42
End: 2022-03-15
Payer: OTHER MISCELLANEOUS

## 2022-03-15 DIAGNOSIS — M75.02 ADHESIVE CAPSULITIS OF LEFT SHOULDER: Primary | ICD-10-CM

## 2022-03-15 PROCEDURE — 97140 MANUAL THERAPY 1/> REGIONS: CPT | Performed by: PHYSICAL MEDICINE & REHABILITATION

## 2022-03-15 PROCEDURE — 97110 THERAPEUTIC EXERCISES: CPT | Performed by: PHYSICAL MEDICINE & REHABILITATION

## 2022-03-15 NOTE — PROGRESS NOTES
PT Re-Evaluation     Today's date: 3/15/2022  Patient name: Isaac Juarez  : 1980   MRN: [id number]  Referring provider: Filiberto Medellin  Dx:   Encounter Diagnosis     ICD-10-CM    1  Adhesive capsulitis of left shoulder  M75 02                   Assessment  Assessment details: Isaac Juarez is a 39 y o  male returning to outpatient physical therapy with noted impairments including cont'd L shoulder pain, impaired soft tissue mobility, reduced range of motion, reduced strength, reduced postural awareness, and reduced activity tolerance  Signs and symptoms at present are consistent with referring diagnosis of L should adhesive capsulitis s/p RTC repair 2021  He was placed on hold for trial of HEP ~2 weeks ago  Pt notes he has been I performing HEP without questions/concerns and notes his ROM is steadily improving with AAROm/PROM  He reports pain continues L GHJ with AROM > 90*, lifting away from his body, or with laying on L side  Continues with limited functional use of L UE overhead  RE performed today and pt demonstrates that he has maintained AROM of L GHJ vs his last attended session of PT  He has demonstrated improvement in his L GHJ PROM with improved tolerance for end range motion and improved end feel of L GHJ  He continues with pain with brining L arm away from his body, especially at 90* and greater  He RTD for follow up end of this month  HEP was again reviewed for self stretching/ROM; understanding noted  As pt has maintained AROM and increased PROM over the last few weeks and is I with HEP, will cont to hold PT until he f/u with MD  Pt in agreement with no questions/concerns  Will see pt back in PT after next MD visit as appropriate/prn  Pt to call if he has any questions/concerns sooner     Impairments: abnormal muscle firing, abnormal muscle tone, abnormal or restricted ROM, activity intolerance, impaired physical strength and pain with function    Symptom irritability: lowUnderstanding of Dx/Px/POC: good   Prognosis: fair    Goals  STGs to be achieved in 2-4 weeks:  1  Pt to demonstrate reduced subjective pain rating "at worst" by at least 2-3 points from Initial Eval in order to allow for reduced pain with ADLs and improved functional activity tolerance  - met   2  Pt to demonstrate grossly WFL PROM L shoulder without increase in pain/sx  - progressing    3  Pt will increase L shoulder AROM elevation to at least 150* without increased pain or compensatory strategies  Met pROM, progressing AROM       LTGs to be achieved in 9-12 weeks:  1  Pt will be I with HEP in order to continue to improve quality of life and independence and reduce risk for re-injury  - met   2  Pt to demonstrate return to ADLs and work without limitations or restrictions  - partially met, limited above 90*   3  Pt to demonstrate improved function as noted by achieving or exceeding predicted score on FOTO outcomes assessment tool  - progressing     Plan  Plan details: Hold PT x 1-2 weeks; pt to cont with HEP  Patient would benefit from: skilled physical therapy  Planned modality interventions: cryotherapy and thermotherapy: hydrocollator packs  Planned therapy interventions: joint mobilization, manual therapy, motor coordination training, neuromuscular re-education, patient education, stretching, therapeutic activities, therapeutic exercise, functional ROM exercises and home exercise program  Frequency: 2x week  Duration in visits: 12  Duration in weeks: 6  Plan of Care beginning date: 3/15/2022  Plan of Care expiration date: 4/26/2022  Treatment plan discussed with: patient        Subjective Evaluation    History of Present Illness  Mechanism of injury: surgery  Mechanism of injury: Pt notes he has been compliant with HEP over the past few weeks  Feels he is getting better AA/PROM with self ROM/stretching   He notes pain seems to be "a little better" however notes he does continue to get pain with end range motion/stretching of L shoulder and with any lifting/reaching overhead or > 90*  Notes he still cannot lay on L shoulder with pain  No new sx/complaints  Continues to avoid activity > 90* 2* pain  Notes less popping/catching of L shoulder with AROM  MD f/u was pushed back to end of this month  No questions/concerns with HEP  Quality of life: good    Pain  Current pain ratin  At best pain ratin (at rest)  At worst pain ratin  Location: L shoulder  Quality: dull ache, tight and burning  Relieving factors: rest, support and ice  Progression: improved    Social Support  Steps to enter house: yes  2  Stairs in house: no   Lives in: [employer] [place]  Lives with: spouse    Employment status: not working ([employer])  Hand dominance: right    Treatments  Previous treatment: physical therapy, injection treatment and medication  Current treatment: medication and physical therapy  Current treatment comments: s/p RTC repair  Patient Goals  Patient goals for therapy: decreased pain, increased motion, increased strength, independence with ADLs/IADLs, return to sport/leisure activities and return to work          Objective     Postural Observations  Seated posture: fair  Standing posture: fair    Additional Postural Observation Details  Forward head/rounded shoulders  Increased thoracic kyphosis   B scapular depression and protraction with mild winging     L scapular depression and winging mildly more noticeable L vs R - improving/more symmetrical           Observations   Left Shoulder   Positive for incision  Negative for edema       Additional Observation Details    "Cyst" noted L superior medial shoulder/UT region; pt notes this is chronic   Incisions healed/closed L shoulder         Cervical/Thoracic Screen   Cervical range of motion within normal limits with the following exceptions: Grossly WFL without pain/sx        Neurological Testing     Sensation     Shoulder   Left Shoulder   Intact: light touch    Right Shoulder   Intact: light touch    Additional Neurological Details  Sensation intact to light touch     Active Range of Motion   Left Shoulder   Flexion: 140 degrees with pain  Abduction: 145 degrees with pain  External rotation 0°: WFL and with pain  External rotation BTH: T2 with pain  Internal rotation 0°: Left shoulder active internal rotation at 0 degrees: to body  Internal rotation BTB: T10 with pain    Additional Active Range of Motion Details  Pain and apprehension approaching end ROM > 90* elevation - continues   Feels L shoulder will "pop out" if he pushes it too far away from his body- continues, pain is more sharp with AROm than it is with PROM per pt  Burning pain L GHJ if he "moves it too much"  Pain local to L GHJ  Mild scapular /shoulder elevation with end ROM ;will cont to monitor ; especially noted with abduction - resolved     Passive Range of Motion   Left Shoulder   Flexion: 165 degrees with pain  Abduction: 160 (scaption 180*) degrees with pain  External rotation 90°: 68 degrees with pain  Internal rotation 90°: 74 degrees with pain    Additional Passive Range of Motion Details  Limited end ROM with apprehension/guarding- improved   Mildly stiff end feel with creep; limited by guarding - improved, stiff end feels with creep, limited  By pain ; more painful with flexion than abduction per pt with pain approaching end range and increased at end range   Improved tolerance for gentle stretching /end ROM vs prior PT sessions with manual tx, especially past 2 weeks   Will cont to assess      L elbow AROM/PROM WNL         Joint Play   Left Shoulder  Hypomobile in the posterior capsule and inferior capsule      Additional Joint Play Details  Gross hypomobility L GHJ as noted above - limited by stiffness and mm guarding       Strength/Myotome Testing     Left Shoulder     Planes of Motion   Flexion: 4 (pain)   Abduction: 4   External rotation at 0°: 4+   Internal rotation at 0°: 4+     Isolated Muscles Biceps: 4+   Upper trapezius: 4+     Additional Strength Details  MMT assessed with break test with arm at side IR/ER and mid range elevation to pt tolerance  Pain with MMT flexion > abduction, most painful with resisted flexion   No pain with IR/ER   Continues to generally report flexion of L shoulder is more painful than abduction       L wrist grossly 4+/5  L hand grossly 4+/5    L elbow 4+/5 flexion without pain/sx; extension 4+/5 with shoulder pain                Precautions: s/p L RTC repair, NO AROM X 6 weeks       Re-eval Date: 2/10    Date 2/22 2/24 3/15     Visit Count 16 17 18     FOTO  2/22      Pain In 0/10 0/10 0/10     Pain Out 2-3/10 0/10 0/10           Manuals 2/22 2/24 3/15     PROM L shoulder   GENTLE , inferior glides L shoulder to tolerance, posterior glides L shoulder to tolerance    Supine  20' total PROM L shoulder      Supine  20' total PROM L shoulder Supine  25' total PROM L shoulder                             Neuro Re-Ed                                                                Ther Ex        Ube warm up  Alt 90rpm  10'  70 RPM   10'  70 RPM  10'  70 RPM      Posterior capsule stretch      Self L shoulder caudal glide 10x10-15"          10x10-15" 10x10-15"          10x10-15" 10x10-15"          10x10-15"     Pulleys flexion/scaption  3' ea 3' ea 3' ea     Finger ladder flexion/scaption 10x/10"ea  10x/10"ea  10x/10"ea      Wall slides vs table slides flexion/abduction      Supine AAROM shoulder flexion               10x10-15"                 10x10-15"      Sleeper stretch      Supine 90/90 self ER stretch          10x/10-15"         10x/10-15"               Pec stretch doorway    10x 10-15" B Pec stretch doorway    10x 10-15" B      Ther Activity                        Gait Training                        Modalities CP 10' no adverse effects CP 10' no adverse effects CP 10' no adverse effects

## 2022-03-17 ENCOUNTER — APPOINTMENT (OUTPATIENT)
Dept: PHYSICAL THERAPY | Facility: CLINIC | Age: 42
End: 2022-03-17
Payer: OTHER MISCELLANEOUS

## 2022-03-22 ENCOUNTER — APPOINTMENT (OUTPATIENT)
Dept: PHYSICAL THERAPY | Facility: CLINIC | Age: 42
End: 2022-03-22
Payer: OTHER MISCELLANEOUS

## 2022-03-24 ENCOUNTER — OFFICE VISIT (OUTPATIENT)
Dept: PHYSICAL THERAPY | Facility: CLINIC | Age: 42
End: 2022-03-24
Payer: OTHER MISCELLANEOUS

## 2022-03-24 DIAGNOSIS — M75.02 ADHESIVE CAPSULITIS OF LEFT SHOULDER: Primary | ICD-10-CM

## 2022-03-24 PROCEDURE — 97112 NEUROMUSCULAR REEDUCATION: CPT | Performed by: PHYSICAL MEDICINE & REHABILITATION

## 2022-03-24 PROCEDURE — 97110 THERAPEUTIC EXERCISES: CPT | Performed by: PHYSICAL MEDICINE & REHABILITATION

## 2022-03-24 NOTE — PROGRESS NOTES
Daily Note     Today's date: 3/24/2022  Patient name: José Miguel Claire  : 1980  MRN: [id number]  Referring provider: Elizabeth Ureña  Dx:   Encounter Diagnosis     ICD-10-CM    1  Adhesive capsulitis of left shoulder  M75 02                   Subjective: Pt notes his shoulder is "sore today because of the rain"  No new sx/complaints otherwise  Feels he continues to maintain ROM well with HEP  Continues to be limited with AROM or lifting/reaching away from his body due to pain L shoulder  F/u with MD Monday  Hoping he can have "another injection" as that helped him last time  Objective: See treatment diary below      Assessment: Tolerated treatment well  He continues to maintain PROM/AAROM L shoulder all planes while he has been performing HEP the past few weeks  He demonstrates overall improvement in his L shoulder PROM all planes from last re-eval  Continues with end range tightness into ER and pure abduction; stiff end feels with creep  Continues to be limited with AROM > 90* L shoulder with c/o pain; demonstrates cont'd apprehension with L shoulder AROM  No complaints after tx  HEP /self ROM reviewed  Patient demonstrated fatigue post treatment and would benefit from continued PT pending f/u with MD Monday  Plan: Continue per plan of care  Progress treatment as tolerated         Precautions: s/p L RTC repair, NO AROM X 6 weeks       Re-eval Date: 2/10    Date 2/22 2/24 3/15 3/24    Visit Count 16 17 18 19    FOTO        Pain In 0/10 0/10 0/10 3/10    Pain Out 2-3/10 0/10 0/10 4/10          Manuals 2/22 2/24 3/15 3/24    PROM L shoulder   GENTLE , inferior glides L shoulder to tolerance, posterior glides L shoulder to tolerance    Supine  20' total PROM L shoulder      Supine  20' total PROM L shoulder Supine  25' total PROM L shoulder Supine  20' total PROM L shoulder                            Neuro Re-Ed                                                                Ther Ex Ube warm up  Alt 90rpm  10'  70 RPM   10'  70 RPM  10'  70 RPM  10'  70 RPM     Posterior capsule stretch      Self L shoulder caudal glide 10x10-15"          10x10-15" 10x10-15"          10x10-15" 10x10-15"          10x10-15" 10x10-15"          10x10-15"    Pulleys flexion/scaption  3' ea 3' ea 3' ea 3' ea    Finger ladder flexion/scaption 10x/10"ea  10x/10"ea  10x/10"ea  10x/10"ea     Wall slides vs table slides flexion/abduction      Supine AAROM shoulder flexion               10x10-15"                 10x10-15"      Sleeper stretch      Supine 90/90 self ER stretch          10x/10-15"         10x/10-15"               Pec stretch doorway    10x 10-15" B Pec stretch doorway    10x 10-15" B      Ther Activity                        Gait Training                        Modalities CP 10' no adverse effects CP 10' no adverse effects CP 10' no adverse effects deferred

## 2022-03-28 ENCOUNTER — OFFICE VISIT (OUTPATIENT)
Dept: OBGYN CLINIC | Facility: OTHER | Age: 42
End: 2022-03-28
Payer: OTHER MISCELLANEOUS

## 2022-03-28 VITALS
HEIGHT: 78 IN | SYSTOLIC BLOOD PRESSURE: 170 MMHG | DIASTOLIC BLOOD PRESSURE: 117 MMHG | BODY MASS INDEX: 36.45 KG/M2 | WEIGHT: 315 LBS

## 2022-03-28 DIAGNOSIS — M75.02 ADHESIVE CAPSULITIS OF LEFT SHOULDER: Primary | ICD-10-CM

## 2022-03-28 PROCEDURE — 99214 OFFICE O/P EST MOD 30 MIN: CPT | Performed by: PHYSICIAN ASSISTANT

## 2022-03-28 RX ORDER — COVID-19 ANTIGEN TEST
KIT MISCELLANEOUS
COMMUNITY
Start: 2022-02-12

## 2022-03-28 NOTE — PATIENT INSTRUCTIONS

## 2022-03-28 NOTE — PROGRESS NOTES
Assessment  Diagnoses and all orders for this visit:    Adhesive capsulitis of left shoulder      Discussion and Plan:    Nadege Palomino has improved motion since last visit  He would benefit with continued stretching with therapy as well as on his own at home  I would advise against progression to strengthening at this time  I will message therapist with parameters for strengthening  1  Continue with PT  2  Continue with HEP  3  Left shoulder intra-articular injection  4  Follow up in 2 months  5  Note for work provided - light duty ( in [employer] [facility] [employer])    Subjective:   Patient ID: Richie Aguirre is a 39 y o  male      Nadege Palomino presents in follow up of his adhesive capsulitis  He reports overall improvement in motion and pain  He has pain with activities and rest   Pain at night is improving  Denies new injury or trauma  Nadege Palomino has been compliant with formal therapy as well as his HEP  Therapy has been stretching and avoiding the strengthening exercises as we suggested  Nadege Palomino reports immediate relief with the previous cortisone injection  Denies numbness or tingling  Denies catching (was previous complaint)  The following portions of the patient's history were reviewed and updated as appropriate: allergies, current medications, past family history, past medical history, past social history, past surgical history and problem list     Review of Systems   Constitutional: Negative for chills and fever  HENT: Negative for hearing loss  Eyes: Negative for visual disturbance  Respiratory: Negative for shortness of breath  Cardiovascular: Negative for chest pain  Gastrointestinal: Negative for abdominal pain  Musculoskeletal:        As reviewed in the HPI   Skin: Negative for rash  Neurological:        As reviewed in the HPI   Psychiatric/Behavioral: Negative for agitation         Objective:  BP (!) 170/117 (BP Location: Right arm, Patient Position: Sitting, Cuff Size: Adult) Comment: patient states he has white coat syndrom  Ht 6' 6" (1 981 m)   Wt (!) 145 kg (319 lb)   BMI 36 86 kg/m²       Right Shoulder Exam     Range of Motion   External rotation: 80       Left Shoulder Exam     Tenderness   The patient is experiencing no tenderness  Range of Motion   Active abduction: 90   Passive abduction: 120   External rotation: 30   Forward flexion: 110 (passive 145)   Internal rotation 0 degrees: Lumbar     Tests   Apprehension: negative    Other   Erythema: absent  Sensation: normal  Pulse: present             Physical Exam  Constitutional:       Appearance: He is well-developed  HENT:      Head: Normocephalic  Pulmonary:      Effort: No respiratory distress  Breath sounds: No wheezing  Musculoskeletal:      Cervical back: Normal range of motion  Skin:     General: Skin is warm and dry  Neurological:      Mental Status: He is alert and oriented to person, place, and time  Psychiatric:         Behavior: Behavior normal          Thought Content:  Thought content normal          Judgment: Judgment normal

## 2022-03-28 NOTE — LETTER
March 28, 2022     Patient: Luis Fernando Hidalgo   YOB: 1980   Date of Visit: 3/28/2022       To Whom it May Concern:    Sheridan Sotelo is under my professional care  He was seen in my office on 3/28/2022  He may return to work with limitations : continue with light duty restrictions  Next follow up in 2 months  If you have any questions or concerns, please don't hesitate to call  Sincerely,          Elisabet Salcedo PA-C        CC: Maame Puckett

## 2022-03-29 ENCOUNTER — OFFICE VISIT (OUTPATIENT)
Dept: PHYSICAL THERAPY | Facility: CLINIC | Age: 42
End: 2022-03-29
Payer: OTHER MISCELLANEOUS

## 2022-03-29 DIAGNOSIS — M75.02 ADHESIVE CAPSULITIS OF LEFT SHOULDER: Primary | ICD-10-CM

## 2022-03-29 PROCEDURE — 97110 THERAPEUTIC EXERCISES: CPT

## 2022-03-29 PROCEDURE — 97112 NEUROMUSCULAR REEDUCATION: CPT

## 2022-03-29 PROCEDURE — 97140 MANUAL THERAPY 1/> REGIONS: CPT

## 2022-03-29 NOTE — PROGRESS NOTES
Daily Note     Today's date: 3/29/2022  Patient name: Brittney Johnson  : 1980  MRN: [id number]  Referring provider: Pippa Driscoll  Dx:   Encounter Diagnosis     ICD-10-CM    1  Adhesive capsulitis of left shoulder  M75 02        Start Time: 915  Stop Time:   Total time in clinic (min): 65 minutes    Subjective: "I saw the PA-C and she was pleased with my motion but not ready to strengthen yet  She doesn't want to it freeze up again  She did give me an injection and shoulder is sore today "      Objective: See treatment diary below      Assessment: Per message to PT from PA-C, pt allowed to strength periscapular muscles  Tolerated treatment well  Added light strengthening with good tolerance  Good AROM in all planes with appropriate end range tightness and slight pain with flexion and ER  Will continue to progress as able to address deficits  Patient demonstrated fatigue post treatment and would benefit from continued PT      Plan: Continue per plan of care  Progress treatment as tolerated         Precautions: s/p L RTC repair, NO AROM X 6 weeks       Re-eval Date: 2/10    Date 2/22 2/24 3/15 3/24 3/29   Visit Count 16 17 18 19 20   FOTO        Pain In 0/10 0/10 0/10 310 2-3/10   Pain Out 2-3/10 0/10 0/10 4/10 2-3/10         Manuals 2/22 2/24 3/15 3/24 3/29   PROM L shoulder   GENTLE , inferior glides L shoulder to tolerance, posterior glides L shoulder to tolerance    Supine  20' total PROM L shoulder      Supine  20' total PROM L shoulder Supine  25' total PROM L shoulder Supine  20' total PROM L shoulder Supine  20' total PROM L shoulder                           Neuro Re-Ed                                                                Ther Ex        Ube warm up  Alt 90rpm  10'  70 RPM   10'  70 RPM  10'  70 RPM  10'  70 RPM  10'  70 RPM    Posterior capsule stretch      Self L shoulder caudal glide 10x10-15"          10x10-15" 10x10-15"          10x10-15" 10x10-15"          10x10-15" 10x10-15"          10x10-15" 10x10-15"   Pulleys flexion/scaption  3' ea 3' ea 3' ea 3' ea    Finger ladder flexion/scaption 10x/10"ea  10x/10"ea  10x/10"ea  10x/10"ea  10x/10"ea   Wall slides vs table slides flexion/abduction      Supine AAROM shoulder flexion               10x10-15"                 10x10-15"   MTP/LTP  grn 2x10/3-5"   Sleeper stretch      Supine 90/90 self ER stretch          10x/10-15"         10x/10-15"   Prone scap/hori abd/ext  2x10/3-5"        Prone row  2x10/3-5"    Pec stretch doorway    10x 10-15" B Pec stretch doorway    10x 10-15" B   Pec stretch doorway    10x 10-15" B   Ther Activity                        Gait Training                        Modalities CP 10' no adverse effects CP 10' no adverse effects CP 10' no adverse effects deferred CP 10' no adverse effects

## 2022-04-01 ENCOUNTER — OFFICE VISIT (OUTPATIENT)
Dept: PHYSICAL THERAPY | Facility: CLINIC | Age: 42
End: 2022-04-01
Payer: OTHER MISCELLANEOUS

## 2022-04-01 DIAGNOSIS — M75.02 ADHESIVE CAPSULITIS OF LEFT SHOULDER: Primary | ICD-10-CM

## 2022-04-01 PROCEDURE — 97112 NEUROMUSCULAR REEDUCATION: CPT

## 2022-04-01 PROCEDURE — 97110 THERAPEUTIC EXERCISES: CPT

## 2022-04-01 PROCEDURE — 97140 MANUAL THERAPY 1/> REGIONS: CPT

## 2022-04-01 NOTE — PROGRESS NOTES
Daily Note     Today's date: 2022  Patient name: Lizett Bell  : 1980  MRN: [id number]  Referring provider: Beena Garnica  Dx:   Encounter Diagnosis     ICD-10-CM    1  Adhesive capsulitis of left shoulder  M75 02        Start Time: 916  Stop Time:   Total time in clinic (min): 64 minutes    Subjective: Patient is feeling good today  Felt okay after last visit  Objective: See treatment diary below    Assessment: Tolerated treatment well  Fair tolerance to passive ROM and gentle joint mobilization to L shoulder  Pt demonstrated good scapular activation w/prone exercises today  Patient would benefit from continued PT  Plan: Continue per plan of care        Precautions: s/p L RTC repair, NO AROM X 6 weeks       Re-eval Date: 2/10    Date 4/1 2/24 3/15 3/24 3/29   Visit Count 21 17 18 19 20   FOTO        Pain In  0/10 0/10 3/10 2-3/10   Pain Out  0/10 0/10 4/10 2-3/10         Manuals 4/1 2/24 3/15 3/24 3/29   PROM L shoulder   GENTLE , inferior glides L shoulder to tolerance, posterior glides L shoulder to tolerance    Supine  25' total PROM L shoulder Supine  20' total PROM L shoulder Supine  25' total PROM L shoulder Supine  20' total PROM L shoulder Supine  20' total PROM L shoulder                           Neuro Re-Ed                                                                Ther Ex        Ube warm up  Alt 90rpm  10'  70 RPM  10'  70 RPM  10'  70 RPM  10'  70 RPM  10'  70 RPM    Posterior capsule stretch      Self L shoulder caudal glide  10x10-15"          10x10-15" 10x10-15"          10x10-15" 10x10-15"          10x10-15" 10x10-15"   Pulleys flexion/scaption 3' ea 3' ea 3' ea 3' ea    Finger ladder flexion/scaption  10x/10"ea  10x/10"ea  10x/10"ea  10x/10"ea   Wall slides vs table slides flexion/abduction      Supine AAROM shoulder flexion MTP/LTP  grn 2x10/3-5"               10x10-15"   MTP/LTP  grn 2x10/3-5"   Sleeper stretch      Supine 90/90 self ER stretch  Prone scap/hori abd/ext  2x10/3-5"         10x/10-15"   Prone scap/hori abd/ext  2x10/3-5"    Prone row  2x10/3-5"    Prone row  2x10/3-5"    Pec stretch doorway    10x 10-15" B Pec stretch doorway    10x 10-15" B   Pec stretch doorway    10x 10-15" B   Ther Activity                        Gait Training                        Modalities  CP 10' no adverse effects CP 10' no adverse effects deferred CP 10' no adverse effects

## 2022-04-05 ENCOUNTER — OFFICE VISIT (OUTPATIENT)
Dept: PHYSICAL THERAPY | Facility: CLINIC | Age: 42
End: 2022-04-05
Payer: OTHER MISCELLANEOUS

## 2022-04-05 DIAGNOSIS — M75.02 ADHESIVE CAPSULITIS OF LEFT SHOULDER: Primary | ICD-10-CM

## 2022-04-05 PROCEDURE — 97112 NEUROMUSCULAR REEDUCATION: CPT

## 2022-04-05 PROCEDURE — 97140 MANUAL THERAPY 1/> REGIONS: CPT

## 2022-04-05 PROCEDURE — 97110 THERAPEUTIC EXERCISES: CPT

## 2022-04-05 NOTE — PROGRESS NOTES
Daily Note     Today's date: 2022  Patient name: Quinn Barbour  : 1980  MRN: [id number]  Referring provider: Rian Banks  Dx:   Encounter Diagnosis     ICD-10-CM    1  Adhesive capsulitis of left shoulder  M75 02        Start Time: 915  Stop Time: 1030  Total time in clinic (min): 75 minutes    Subjective: "I have been sore all weekend  I was really stretched out last time and it hurt "      Objective: See treatment diary below      Assessment: Tolerated treatment well  Pt continues with good PROM nearly full range with overall decreased pain  Strength deficits noted, however no strengthening was initiated at this time  Will continue to progress as able to return to PLOF  Patient demonstrated fatigue post treatment and would benefit from continued PT      Plan: Continue per plan of care  Progress treatment as tolerated         Precautions: s/p L RTC repair, NO AROM X 6 weeks       Re-eval Date:     Date       Visit Count 21 22      FOTO  4/5      Pain In  2-3/10      Pain Out  0-1/10            Manuals       PROM L shoulder   GENTLE , inferior glides L shoulder to tolerance, posterior glides L shoulder to tolerance    Supine  25' total PROM L shoulder Supine  20' total PROM L shoulder                              Neuro Re-Ed                                                                Ther Ex        Ube warm up  Alt 90rpm  10'  70 RPM  10'  70 RPM       Posterior capsule stretch      Self L shoulder caudal glide        Pulleys flexion/scaption 3' ea 3' ea      Finger ladder flexion/scaption  10x/10"ea       Wall slides vs table slides flexion/abduction      Supine AAROM shoulder flexion MTP/LTP  grn 2x10/3-5" MTP/LTP  grn 2x10/3-5"                              Sleeper stretch      Supine 90/90 self ER stretch  Prone scap/hori abd/ext  2x10/3-5" Prone scap/hori abd/ext  2x10/3-5"             Prone row  2x10/3-5" Prone row  2x10/3-5"       Pec stretch doorway    10x 10-15" B Pec stretch doorway    10x 10-15" B      Ther Activity                        Gait Training                        Modalities  CP 10' no adverse effects

## 2022-04-07 ENCOUNTER — OFFICE VISIT (OUTPATIENT)
Dept: PHYSICAL THERAPY | Facility: CLINIC | Age: 42
End: 2022-04-07
Payer: OTHER MISCELLANEOUS

## 2022-04-07 DIAGNOSIS — M75.02 ADHESIVE CAPSULITIS OF LEFT SHOULDER: Primary | ICD-10-CM

## 2022-04-07 PROCEDURE — 97140 MANUAL THERAPY 1/> REGIONS: CPT

## 2022-04-07 PROCEDURE — 97110 THERAPEUTIC EXERCISES: CPT

## 2022-04-07 PROCEDURE — 97112 NEUROMUSCULAR REEDUCATION: CPT

## 2022-04-07 NOTE — PROGRESS NOTES
Daily Note     Today's date: 2022  Patient name: Ellie Brown  : 1980  MRN: [id number]  Referring provider: Dusty Prabhakar  Dx:   Encounter Diagnosis     ICD-10-CM    1  Adhesive capsulitis of left shoulder  M75 02        Start Time: 915  Stop Time:   Total time in clinic (min): 55 minutes    Subjective: "My shoulder is sore but not as bad as it was  The motion is getting better, I just don't have strength "      Objective: See treatment diary below      Assessment: Tolerated treatment well  Pt improving with motion with continued end range that has decreased  Rotations continues to me most limited and painful  Will continue to progress as able to address deficits  Patient demonstrated fatigue post treatment and would benefit from continued PT      Plan: Continue per plan of care  Progress treatment as tolerated         Precautions: s/p L RTC repair, NO AROM X 6 weeks       Re-eval Date:      Date      Visit Count 21 22 23     FOTO        Pain In  2-310      Pain Out  0-1/10            Manuals      PROM L shoulder   GENTLE , inferior glides L shoulder to tolerance, posterior glides L shoulder to tolerance    Supine  25' total PROM L shoulder Supine  20' total PROM L shoulder                              Neuro Re-Ed                                                                Ther Ex        Ube warm up  Alt 90rpm  10'  70 RPM  10'  70 RPM  10'  70 RPM      Posterior capsule stretch      Self L shoulder caudal glide        Pulleys flexion/scaption 3' ea 3' ea 3' ea     Finger ladder flexion/scaption  10x/10"ea  10x/10"ea      Wall slides vs table slides flexion/abduction      Supine AAROM shoulder flexion MTP/LTP  grn 2x10/3-5" MTP/LTP  grn 2x10/3-5"                         MTP/LTP  grn 2x10/3-5"            standing AAROM shoulder flexion/scap  10x10"     Sleeper stretch      Supine 90/90 self ER stretch  Prone scap/hori abd/ext  2x10/3-5" Prone scap/hori abd/ext  2x10/3-5"       Prone scap/hori abd/ext  2x10/3-5"      Prone row  2x10/3-5" Prone row  2x10/3-5" Prone row  2x10/3-5"      Pec stretch doorway    10x 10-15" B Pec stretch doorway    10x 10-15" B      Ther Activity                        Gait Training                        Modalities  CP 10' no adverse effects

## 2022-04-12 ENCOUNTER — OFFICE VISIT (OUTPATIENT)
Dept: PHYSICAL THERAPY | Facility: CLINIC | Age: 42
End: 2022-04-12
Payer: OTHER MISCELLANEOUS

## 2022-04-12 DIAGNOSIS — M75.02 ADHESIVE CAPSULITIS OF LEFT SHOULDER: Primary | ICD-10-CM

## 2022-04-12 PROCEDURE — 97140 MANUAL THERAPY 1/> REGIONS: CPT | Performed by: PHYSICAL MEDICINE & REHABILITATION

## 2022-04-12 PROCEDURE — 97110 THERAPEUTIC EXERCISES: CPT | Performed by: PHYSICAL MEDICINE & REHABILITATION

## 2022-04-12 NOTE — PROGRESS NOTES
Daily Note     Today's date: 2022  Patient name: Raya Dumont  : 1980  MRN: [id number]  Referring provider: Fawn Loera  Dx:   Encounter Diagnosis     ICD-10-CM    1  Adhesive capsulitis of left shoulder  M75 02                   Subjective: Pt notes no new sx/complaints  Notes he feels his ROM is improving gradually  Notes he "wishes it didn't hurt though"  Notes cont'd pain L GHJ with reaching away from his body or overhead  Objective: See treatment diary below      Assessment: Tolerated treatment well  Focused more on PROM/GHJ stretching and mobilizations to tolerance to restore/increase ROM L GHJ  Pt tolerated well  Cont with end range pain/discomfort all planes  Increased L shoulder PROM noted into ER this date; noted "pinching" pain posterior GHJ at end range only; resolved at rest  No other sx/complaints  Cont with reduced inferior glide mobility L GHJ  PROM L GHJ abduction and ER approaching normal limits  May initiate light strengthening upcoming sessions and see how pt responds/toelrates  "the usual" general soreness L GHJ noted after session; reduced with CP  No complaints after tx  Patient demonstrated fatigue post treatment and would benefit from continued PT      Plan: Continue per plan of care  Progress treatment as tolerated         Precautions: s/p L RTC repair, NO AROM X 6 weeks       Re-eval Date:      Date     Visit Count 21 22 23 24    FOTO  /5      Pain In  2-3/10  0-1/10    Pain Out  0-1/10  3/10          Manuals     PROM L shoulder   GENTLE , inferior glides L shoulder to tolerance, posterior glides L shoulder to tolerance    Supine  25' total PROM L shoulder Supine  20' total PROM L shoulder  Supine  25' total PROM L shoulder  End range stretch to tolerance all planes with light distraction  Grade 3-4 supine posterior glides and inferior glides L GHJ to tolerance                             Neuro Re-Ed Ther Ex        Ube warm up  Alt 90rpm  10'  70 RPM  10'  70 RPM  10'  70 RPM  10'  60 RPM     Posterior capsule stretch      Self L shoulder caudal glide        Pulleys flexion/scaption 3' ea 3' ea 3' ea 3' ea     Finger ladder flexion/scaption  10x/10"ea  10x/10"ea  Wall slides flexion /abduction  10x10-15" ea    Wall slides vs table slides flexion/abduction      Supine AAROM shoulder flexion MTP/LTP  grn 2x10/3-5" MTP/LTP  grn 2x10/3-5"                         MTP/LTP  grn 2x10/3-5"            standing AAROM shoulder flexion/scap  10x10"     Sleeper stretch      Supine 90/90 self ER stretch  Prone scap/hori abd/ext  2x10/3-5" Prone scap/hori abd/ext  2x10/3-5"       Prone scap/hori abd/ext  2x10/3-5"      Prone row  2x10/3-5" Prone row  2x10/3-5" Prone row  2x10/3-5"      Pec stretch doorway    10x 10-15" B Pec stretch doorway    10x 10-15" B      Ther Activity                        Gait Training                        Modalities  CP 10' no adverse effects  CP 10' no adverse effects

## 2022-04-14 ENCOUNTER — OFFICE VISIT (OUTPATIENT)
Dept: PHYSICAL THERAPY | Facility: CLINIC | Age: 42
End: 2022-04-14
Payer: OTHER MISCELLANEOUS

## 2022-04-14 DIAGNOSIS — M75.02 ADHESIVE CAPSULITIS OF LEFT SHOULDER: Primary | ICD-10-CM

## 2022-04-14 PROCEDURE — 97110 THERAPEUTIC EXERCISES: CPT

## 2022-04-14 PROCEDURE — 97140 MANUAL THERAPY 1/> REGIONS: CPT

## 2022-04-14 NOTE — PROGRESS NOTES
Daily Note     Today's date: 2022  Patient name: Juma Archer  : 1980  MRN: [id number]  Referring provider: Shraddha Monson  Dx:   Encounter Diagnosis     ICD-10-CM    1  Adhesive capsulitis of left shoulder  M75 02        Start Time: 745  Stop Time: 840  Total time in clinic (min): 55 minutes     Subjective: Pt offered no concerns with shoulder "      Objective: See treatment diary below      Assessment: Tolerated treatment well  Pt continues to gain motion in all planes  Slow and steady improvement with AROM  Improved tolerance of PROM especially with rotations  Will continue to progress as able to address remaining deficits  Patient demonstrated fatigue post treatment and would benefit from continued PT      Plan: Continue per plan of care  Progress treatment as tolerated         Precautions: s/p L RTC repair, NO AROM X 6 weeks       Re-eval Date:      Date    Visit Count 21 22 23 24 25   FOTO        Pain In  2-3/10  0-1/10 0-1   Pain Out  0-1/10  3/10          Manuals    PROM L shoulder   GENTLE , inferior glides L shoulder to tolerance, posterior glides L shoulder to tolerance    Supine  25' total PROM L shoulder Supine  20' total PROM L shoulder  Supine  25' total PROM L shoulder  End range stretch to tolerance all planes with light distraction  Grade 3-4 supine posterior glides and inferior glides L GHJ to tolerance                             Neuro Re-Ed                                                                Ther Ex        Ube warm up  Alt 90rpm  10'  70 RPM  10'  70 RPM  10'  70 RPM  10'  60 RPM  10'  60 RPM    Posterior capsule stretch      Self L shoulder caudal glide        Pulleys flexion/scaption 3' ea 3' ea 3' ea 3' ea  3' ea    Finger ladder flexion/scaption  10x/10"ea  10x/10"ea  Wall slides flexion /abduction  10x10-15" ea Wall slides flexion /abduction  10x10-15"   Wall slides vs table slides flexion/abduction      Supine AAROM shoulder flexion MTP/LTP  grn 2x10/3-5" MTP/LTP  grn 2x10/3-5"                         MTP/LTP  grn 2x10/3-5"            standing AAROM shoulder flexion/scap  10x10" MTP/LTP  grn 2x10/3-5" MTP/LTP  grn 3x10/3-5"            standing AAROM shoulder flexion/scap  10x10"   Sleeper stretch      Supine 90/90 self ER stretch  Prone scap/hori abd/ext  2x10/3-5" Prone scap/hori abd/ext  2x10/3-5"       Prone scap/hori abd/ext  2x10/3-5"  Prone scap/hori abd/ext  2x10/3-5"    Prone row  2x10/3-5" Prone row  2x10/3-5" Prone row  2x10/3-5"  Prone row  2x10/3-5"    Pec stretch doorway    10x 10-15" B Pec stretch doorway    10x 10-15" B      Ther Activity                        Gait Training                        Modalities  CP 10' no adverse effects  CP 10' no adverse effects

## 2022-04-19 ENCOUNTER — OFFICE VISIT (OUTPATIENT)
Dept: PHYSICAL THERAPY | Facility: CLINIC | Age: 42
End: 2022-04-19
Payer: OTHER MISCELLANEOUS

## 2022-04-19 DIAGNOSIS — M75.02 ADHESIVE CAPSULITIS OF LEFT SHOULDER: Primary | ICD-10-CM

## 2022-04-19 PROCEDURE — 97140 MANUAL THERAPY 1/> REGIONS: CPT

## 2022-04-19 PROCEDURE — 97110 THERAPEUTIC EXERCISES: CPT

## 2022-04-19 NOTE — PROGRESS NOTES
Daily Note     Today's date: 2022  Patient name: Tariq Patton  : 1980  MRN: [id number]  Referring provider: Dl Gómez  Dx:   Encounter Diagnosis     ICD-10-CM    1  Adhesive capsulitis of left shoulder  M75 02        Start Time: 0700  Stop Time: 08  Total time in clinic (min): 65 minutes    Subjective: Pt offered no specific concerns  Objective: See treatment diary below      Assessment: Tolerated treatment well  Improved AROM noted with strength deficits to complete range  PROM nearing full range with decreased end range pain  Will continue to progress as able to address deficits  Patient demonstrated fatigue post treatment and would benefit from continued PT      Plan: Continue per plan of care  Progress treatment as tolerated         Precautions: s/p L RTC repair, NO AROM X 6 weeks       Re-eval Date:      Date        Visit Count 26       FOTO        Pain In        Pain Out              Manuals        PROM L shoulder   GENTLE , inferior glides L shoulder to tolerance, posterior glides L shoulder to tolerance    Supine  15' total PROM L shoulder                               Neuro Re-Ed                                                                Ther Ex        Ube warm up  Alt 90rpm  10'  70 RPM        Posterior capsule stretch      Self L shoulder caudal glide        Pulleys flexion/scaption        Finger ladder flexion/scaption Wall slides flexion /abduction  10x10-15"       Wall slides vs table slides flexion/abduction      Supine AAROM shoulder flexion MTP/LTP  grn 3x10/3-5"          Standing AROM  2x10/3-5'                               Sleeper stretch      Supine 90/90 self ER stretch  Prone scap/hori abd/ext  2x10/3-5"        Prone row  2x10/3-5"               Ther Activity                        Gait Training                        Modalities CP 10' no adverse effects

## 2022-04-21 ENCOUNTER — OFFICE VISIT (OUTPATIENT)
Dept: PHYSICAL THERAPY | Facility: CLINIC | Age: 42
End: 2022-04-21
Payer: OTHER MISCELLANEOUS

## 2022-04-21 DIAGNOSIS — M75.02 ADHESIVE CAPSULITIS OF LEFT SHOULDER: Primary | ICD-10-CM

## 2022-04-21 PROCEDURE — 97140 MANUAL THERAPY 1/> REGIONS: CPT

## 2022-04-21 PROCEDURE — 97110 THERAPEUTIC EXERCISES: CPT

## 2022-04-21 NOTE — PROGRESS NOTES
Daily Note     Today's date: 2022  Patient name: Garcia Peterson  : 1980  MRN: [id number]  Referring provider: Army Scheuermann  Dx:   Encounter Diagnosis     ICD-10-CM    1  Adhesive capsulitis of left shoulder  M75 02        Start Time: 915  Stop Time:   Total time in clinic (min): 55 minutes    Subjective: "I woke up feeling pretty good this morning "      Objective: See treatment diary below      Assessment: Tolerated treatment well  Noted tricep tingling and decreased feeling in forearm; resolves with lowering arm  Appears pt positions arm in nerve glide position with wrist extended causing stretch in arm  Will monitor  Motion continues to improve with decreased end range pain  Added sidelying AROM without incident  Will continue to progress as able  Patient demonstrated fatigue post treatment and would benefit from continued PT      Plan: Continue per plan of care  Progress treatment as tolerated         Precautions: s/p L RTC repair, NO AROM X 6 weeks       Re-eval Date:      Date       Visit Count 26 27      FOTO        Pain In        Pain Out              Manuals       PROM L shoulder   GENTLE , inferior glides L shoulder to tolerance, posterior glides L shoulder to tolerance    Supine  15' total PROM L shoulder Supine  15' total PROM L shoulder                              Neuro Re-Ed                                                                Ther Ex        Ube warm up  Alt 90rpm  10'  70 RPM  10'  70 RPM       Posterior capsule stretch      Self L shoulder caudal glide        Pulleys flexion/scaption        Finger ladder flexion/scaption Wall slides flexion /abduction  10x10-15" Wall slides flexion /abduction  10x10-15"      Wall slides vs table slides flexion/abduction      Supine AAROM shoulder flexion MTP/LTP  grn 3x10/3-5"          Standing AROM  2x10/3-5' MTP/LTP  grn 3x10/3-5"            Standing AROM  2x10/3-5'        Sleeper stretch      Supine 90/90 self ER stretch  Prone scap/hori abd/ext  2x10/3-5" Prone scap/hori abd/ext  2x10/3-5"       Prone row  2x10/3-5" Prone row  2x10/3-5"        sidelying ER  2x10    sidelying Flex  2x10    sidelying ABD  2x10      Ther Activity                        Gait Training                        Modalities CP 10' no adverse effects

## 2022-04-26 ENCOUNTER — EVALUATION (OUTPATIENT)
Dept: PHYSICAL THERAPY | Facility: CLINIC | Age: 42
End: 2022-04-26
Payer: OTHER MISCELLANEOUS

## 2022-04-26 DIAGNOSIS — M75.02 ADHESIVE CAPSULITIS OF LEFT SHOULDER: Primary | ICD-10-CM

## 2022-04-26 PROCEDURE — 97140 MANUAL THERAPY 1/> REGIONS: CPT | Performed by: PHYSICAL MEDICINE & REHABILITATION

## 2022-04-26 PROCEDURE — 97110 THERAPEUTIC EXERCISES: CPT | Performed by: PHYSICAL MEDICINE & REHABILITATION

## 2022-04-26 NOTE — PROGRESS NOTES
PT Re-Evaluation     Today's date: 2022  Patient name: Raya Dumont  : 1980   MRN: [id number]  Referring provider: Fawn Loera  Dx:   Encounter Diagnosis     ICD-10-CM    1  Adhesive capsulitis of left shoulder  M75 02                   Assessment  Assessment details: Raya Dumont is a 39 y o  male returning to outpatient physical therapy with noted impairments including cont'd L shoulder pain, impaired soft tissue mobility, reduced range of motion, reduced strength, reduced postural awareness, and reduced activity tolerance  Signs and symptoms at present are consistent with referring diagnosis of L should adhesive capsulitis s/p RTC repair 2021  Pt has been compliant with PT and HEP  He has cont'd to make progress with his L shoulder/GHJ mobility with improvements in active and passive ROM  Most notable improvements have been made in L shoulder abduction and ER ROM vs prior sessions  Gradual improvements noted in shoulder AROM however continues with pain with AROM approaching end range, especially > *  He notes improved functional use of L UE with ADLs, however notes he remains limited by pain and ROM/strength with ADLs/activity above * flexion/abduction  No new sx/complaits  RTD in a few weeks  Reports overall about 70% return to PLOF/improvement in sx to date  As his A/PROM are approaching normal limits, he may benefit from trial of light strengthening  If sx increase/exacerbate, will d/c strengthening  Pt in agreement with no questions/concerns  Impairments: abnormal muscle firing, abnormal muscle tone, abnormal or restricted ROM, activity intolerance, impaired physical strength and pain with function    Symptom irritability: lowUnderstanding of Dx/Px/POC: good   Prognosis: fair    Goals  STGs to be achieved in 2-4 weeks:  1   Pt to demonstrate reduced subjective pain rating "at worst" by at least 2-3 points from Initial Eval in order to allow for reduced pain with ADLs and improved functional activity tolerance  - met   2  Pt to demonstrate grossly WFL PROM L shoulder without increase in pain/sx  - progressing    3  Pt will increase L shoulder AROM elevation to at least 150* without increased pain or compensatory strategies  Met pROM, progressing AROM - METl increase AROM elevation to at least 160-165* in 2-4 weeks to improve functional use of L UE with ADLs       LTGs to be achieved in 9-12 weeks:  1  Pt will be I with HEP in order to continue to improve quality of life and independence and reduce risk for re-injury  - met   2  Pt to demonstrate return to ADLs and work without limitations or restrictions  - partially met, limited above 90*   3  Pt to demonstrate improved function as noted by achieving or exceeding predicted score on FOTO outcomes assessment tool  - progressing     Plan  Patient would benefit from: skilled physical therapy  Planned modality interventions: cryotherapy and thermotherapy: hydrocollator packs  Planned therapy interventions: joint mobilization, manual therapy, motor coordination training, neuromuscular re-education, patient education, stretching, therapeutic activities, therapeutic exercise, functional ROM exercises and home exercise program  Frequency: 2x week  Duration in visits: 12  Duration in weeks: 6  Plan of Care beginning date: 4/26/2022  Plan of Care expiration date: 6/7/2022  Treatment plan discussed with: patient        Subjective Evaluation    History of Present Illness  Mechanism of injury: surgery  Mechanism of injury: Pt reports overall improvements from last RE  Notes pain is lessening; not as intense or as often  Able to tolerate more activity with L UE with less pain/sx  Notes overall about 70% improvement in sx and return to PLOF at present  Notes below shoulder height he is 100% back to normal with activity  However he continues with pain/sx > 90* with pain in L shoulder   Notes some improvement in that now he can perform activities at shoulder height with less pain/sx and improved ROM  Notes he continues to have pain with reaching and lifting > shoulder height  Notes he feels his active and passive ROM has improved with PT, especially recently  Less catching/popping L shoulder with AROM  No new sx/complaints  Quality of life: good    Pain  Current pain ratin  At best pain ratin (at rest)  At worst pain ratin (Brief periods of 6-7/10 that come/go, lasts briefly )  Location: L shoulder  Quality: dull ache, tight and burning  Relieving factors: rest, support and ice  Progression: improved    Social Support  Steps to enter house: yes  2  Stairs in house: no   Lives in: [facility] house  Lives with: spouse    Employment status: not working ([employer])  Hand dominance: right    Treatments  Previous treatment: physical therapy, injection treatment and medication  Current treatment: medication and physical therapy  Current treatment comments: s/p RTC repair  Patient Goals  Patient goals for therapy: decreased pain, increased motion, increased strength, independence with ADLs/IADLs, return to sport/leisure activities and return to work          Objective     Postural Observations  Seated posture: fair  Standing posture: fair    Additional Postural Observation Details  Forward head/rounded shoulders  Increased thoracic kyphosis   B scapular depression and protraction with mild winging     L scapular depression and winging mildly more noticeable L vs R - improving/more symmetrical           Observations   Left Shoulder   Positive for incision  Negative for edema       Additional Observation Details    "Cyst" noted L superior medial shoulder/UT region; pt notes this is chronic   Incisions healed/closed L shoulder         Cervical/Thoracic Screen   Cervical range of motion within normal limits with the following exceptions: Grossly WFL without pain/sx        Neurological Testing     Sensation     Shoulder   Left Shoulder   Intact: light touch    Right Shoulder   Intact: light touch    Additional Neurological Details  Sensation intact to light touch     Active Range of Motion   Left Shoulder   Flexion: 155 degrees with pain  Abduction: 150 degrees with pain  External rotation 0°: WFL and with pain  External rotation BTH: T3 with pain  Internal rotation 0°: Left shoulder active internal rotation at 0 degrees: to body  Internal rotation BTB: T9 with pain    Additional Active Range of Motion Details  Pain and apprehension approaching end ROM > 90* elevation - continues , less painful, more stiff per pt  Burning pain L GHJ if he "moves it too much"; just sore now per pt; more pain with ROM > 90* approaching end ranges   Pain local to L GHJ/anterior shoulder  Mild scapular /shoulder elevation with end ROM ;will cont to monitor ; especially noted with abduction - resolved     Passive Range of Motion   Left Shoulder   Flexion: 165 degrees with pain  Abduction: 178 (scaption 180*) degrees with pain  External rotation 90°: 75 degrees with pain  Internal rotation 90°: 75 degrees with pain    Additional Passive Range of Motion Details  Limited end ROM with apprehension/guarding- improved   Mildly stiff end feel with creep; limited by guarding - improved, stiff end feels with creep, limited  By pain ; more painful with flexion than abduction per pt with pain approaching end range and increased at end range - improved/less painful, continues to be stiff/tight at end ranges , most discomfort with end range flexion and ER with slower progress/improvement   Improved tolerance for gentle stretching /end ROM vs prior PT sessions with manual tx, especially past 2 weeks   Will cont to assess      L elbow AROM/PROM WNL         Joint Play   Left Shoulder  Hypomobile in the posterior capsule and inferior capsule      Additional Joint Play Details  Gross hypomobility L GHJ as noted above - limited by stiffness and mm guarding       Strength/Myotome Testing     Left Shoulder     Planes of Motion   Flexion: 4 (pain)   Abduction: 4   External rotation at 0°: 4+   Internal rotation at 0°: 4+     Isolated Muscles   Biceps: 4+   Upper trapezius: 4+     Additional Strength Details  MMT assessed with break test with arm at side IR/ER and mid range elevation to pt tolerance  Pain with MMT flexion > abduction, most painful with resisted flexion   No pain with IR/ER   Continues to generally report flexion of L shoulder is more painful than abduction       L wrist grossly 4+/5  L hand grossly 4+/5    L elbow 4+/5 flexion without pain/sx; extension 4+/5 with shoulder pain                Precautions: s/p L RTC repair, NO AROM X 6 weeks       Re-eval Date: 6/7     Date 4/19 4/21 4/26     Visit Count 26 27 28     FOTO        Pain In   0/10     Pain Out   3/10           Manuals 4/19 4/21 4/26     PROM L shoulder   GENTLE , inferior glides L shoulder to tolerance, posterior glides L shoulder to tolerance    Supine  15' total PROM L shoulder Supine  15' total PROM L shoulder Supine  10' total PROM L shoulder                             Neuro Re-Ed                                                                Ther Ex        Ube warm up  Alt 90rpm  10'  70 RPM  10'  70 RPM  10' 60 rpm      Posterior capsule stretch      Self L shoulder caudal glide        Pulleys flexion/scaption        Finger ladder flexion/scaption Wall slides flexion /abduction  10x10-15" Wall slides flexion /abduction  10x10-15" Wall slides flexion /abduction  10x10-15"     Wall slides vs table slides flexion/abduction      Supine AAROM shoulder flexion MTP/LTP  grn 3x10/3-5"          Standing AROM  2x10/3-5' MTP/LTP  grn 3x10/3-5"            Standing AROM  2x10/3-5'                 Standing AROM  2x10/3-5'     Sleeper stretch      Supine 90/90 self ER stretch  Prone scap/hori abd/ext  2x10/3-5" Prone scap/hori abd/ext  2x10/3-5" Prone scap/hori abd/ext  2x10/3-5"      Prone row  2x10/3-5" Prone row  2x10/3-5" Prone row  2x10/3-5"       sidelying ER  2x10    sidelying Flex  2x10    sidelying ABD  2x10 sidelying ER  2x10    sidelying Flex  2x10    sidelying ABD  2x10     Ther Activity                        Gait Training                        Modalities CP 10' no adverse effects  CP 10' no adverse effects

## 2022-04-28 ENCOUNTER — OFFICE VISIT (OUTPATIENT)
Dept: PHYSICAL THERAPY | Facility: CLINIC | Age: 42
End: 2022-04-28
Payer: OTHER MISCELLANEOUS

## 2022-04-28 DIAGNOSIS — M75.02 ADHESIVE CAPSULITIS OF LEFT SHOULDER: Primary | ICD-10-CM

## 2022-04-28 PROCEDURE — 97110 THERAPEUTIC EXERCISES: CPT

## 2022-04-28 PROCEDURE — 97112 NEUROMUSCULAR REEDUCATION: CPT

## 2022-04-28 PROCEDURE — 97140 MANUAL THERAPY 1/> REGIONS: CPT

## 2022-04-28 NOTE — PROGRESS NOTES
Daily Note     Today's date: 2022  Patient name: Madelyn Sanchez  : 1980  MRN: [id number]  Referring provider: Garrison Turner  Dx:   Encounter Diagnosis     ICD-10-CM    1  Adhesive capsulitis of left shoulder  M75 02        Start Time: 915  Stop Time:   Total time in clinic (min): 55 minutes    Subjective: "My shoulder is sore this morning "      Objective: See treatment diary below      Assessment: Tolerated treatment well  Pt continues with strength and motion  End range pain has been decreasing with increased tolerance of MT  Scapular fatigue noted during side lying exercises; challenged to maintain straight lines in shoulder movements  Will continue to progress as able to address deficits  Patient demonstrated fatigue post treatment and would benefit from continued PT      Plan: Continue per plan of care  Progress treatment as tolerated         Precautions: s/p L RTC repair, NO AROM X 6 weeks       Re-eval Date:      Date     Visit Count 26 27 28 29    FOTO        Pain In   0/10     Pain Out   3/10           Manuals     PROM L shoulder   GENTLE , inferior glides L shoulder to tolerance, posterior glides L shoulder to tolerance    Supine  15' total PROM L shoulder Supine  15' total PROM L shoulder Supine  10' total PROM L shoulder Supine  10' total PROM L shoulder                            Neuro Re-Ed                                                                Ther Ex        Ube warm up  Alt 90rpm  10'  70 RPM  10'  70 RPM  10' 60 rpm  10' 60 rpm     Posterior capsule stretch      Self L shoulder caudal glide        Pulleys flexion/scaption        Finger ladder flexion/scaption Wall slides flexion /abduction  10x10-15" Wall slides flexion /abduction  10x10-15" Wall slides flexion /abduction  10x10-15" Wall slides flexion /abduction  10x10-15"    Wall slides vs table slides flexion/abduction      Supine AAROM shoulder flexion MTP/LTP  grn 3x10/3-5"          Standing AROM  2x10/3-5' MTP/LTP  grn 3x10/3-5"            Standing AROM  2x10/3-5'                 Standing AROM  2x10/3-5' MTP/LTP  blue 3x10/3-5"    IR/ER  Blue  2x10/3-5"    Standing AROM  2x10/3-5'    Sleeper stretch      Supine 90/90 self ER stretch  Prone scap/hori abd/ext  2x10/3-5" Prone scap/hori abd/ext  2x10/3-5" Prone scap/hori abd/ext  2x10/3-5" Prone scap/hori abd/ext  2x10/3-5"     Prone row  2x10/3-5" Prone row  2x10/3-5" Prone row  2x10/3-5" Prone row  2x10/3-5"      sidelying ER  2x10    sidelying Flex  2x10    sidelying ABD  2x10 sidelying ER  2x10    sidelying Flex  2x10    sidelying ABD  2x10 sidelying ER  2x10    sidelying Flex  2x10    sidelying ABD  2x10    Ther Activity                        Gait Training                        Modalities CP 10' no adverse effects  CP 10' no adverse effects CP 10' no adverse effects

## 2022-05-03 ENCOUNTER — OFFICE VISIT (OUTPATIENT)
Dept: PHYSICAL THERAPY | Facility: CLINIC | Age: 42
End: 2022-05-03
Payer: OTHER MISCELLANEOUS

## 2022-05-03 DIAGNOSIS — M75.02 ADHESIVE CAPSULITIS OF LEFT SHOULDER: Primary | ICD-10-CM

## 2022-05-03 PROCEDURE — 97140 MANUAL THERAPY 1/> REGIONS: CPT

## 2022-05-03 PROCEDURE — 97110 THERAPEUTIC EXERCISES: CPT

## 2022-05-03 PROCEDURE — 97112 NEUROMUSCULAR REEDUCATION: CPT

## 2022-05-03 NOTE — PROGRESS NOTES
Daily Note     Today's date: 5/3/2022  Patient name: Fany Ordaz  : 1980  MRN: [id number]  Referring provider: Lula Huff  Dx:   Encounter Diagnosis     ICD-10-CM    1  Adhesive capsulitis of left shoulder  M75 02        Start Time: 915  Stop Time:   Total time in clinic (min): 55 minutes    Subjective: "It seems more "clunky" today  Objective: See treatment diary below      Assessment: Tolerated treatment well  Pain noted at end range of AROM in flexion with muscle fatigue  Noted anterior shoulder pain at bicep tendon region with IR with tband; decreased resistance with improved tolerance  PROM continues to improve nearly full range with minimal end range pain in flexion and abduction  Rotations remain tight with end range pain  Will continue to progress as able to address deficits  Patient demonstrated fatigue post treatment and would benefit from continued PT      Plan: Continue per plan of care  Progress treatment as tolerated         Precautions: s/p L RTC repair, NO AROM X 6 weeks       Re-eval Date:      Date 4/19 4/21 4/26 4/28 5/3   Visit Count 26 27 28 29 30   FOTO        Pain In   0/10     Pain Out   3/10           Manuals 4/19 4/21 4/26 4/28 5/3   PROM L shoulder   GENTLE , inferior glides L shoulder to tolerance, posterior glides L shoulder to tolerance    Supine  15' total PROM L shoulder Supine  15' total PROM L shoulder Supine  10' total PROM L shoulder Supine  10' total PROM L shoulder Supine  10' total PROM L shoulder                           Neuro Re-Ed                                                                Ther Ex        Ube warm up  Alt 90rpm  10'  70 RPM  10'  70 RPM  10' 60 rpm  10' 60 rpm  10' 80 rpm    Posterior capsule stretch      Self L shoulder caudal glide        Pulleys flexion/scaption        Finger ladder flexion/scaption Wall slides flexion /abduction  10x10-15" Wall slides flexion /abduction  10x10-15" Wall slides flexion /abduction  10x10-15" Wall slides flexion /abduction  10x10-15" Wall slides flexion /abduction  10x10-15"   Wall slides vs table slides flexion/abduction      Supine AAROM shoulder flexion MTP/LTP  grn 3x10/3-5"          Standing AROM  2x10/3-5' MTP/LTP  grn 3x10/3-5"            Standing AROM  2x10/3-5'                 Standing AROM  2x10/3-5' MTP/LTP  blue 3x10/3-5"    IR/ER  Blue  2x10/3-5"    Standing AROM  2x10/3-5' MTP/LTP  blue 3x10/3-5"    IR grn  32x10/3-5"    ER blue  2x10/3-5      Standing AROM  2x10/3-5'   Sleeper stretch      Supine 90/90 self ER stretch  Prone scap/hori abd/ext  2x10/3-5" Prone scap/hori abd/ext  2x10/3-5" Prone scap/hori abd/ext  2x10/3-5" Prone scap/hori abd/ext  2x10/3-5" Prone scap/hori abd/ext  2x10/3-5"    Prone row  2x10/3-5" Prone row  2x10/3-5" Prone row  2x10/3-5" Prone row  2x10/3-5" Prone row  2x10/3-5"     sidelying ER  2x10    sidelying Flex  2x10    sidelying ABD  2x10 sidelying ER  2x10    sidelying Flex  2x10    sidelying ABD  2x10 sidelying ER  2x10    sidelying Flex  2x10    sidelying ABD  2x10 sidelying ER  2x10    sidelying Flex  2x10    sidelying ABD  2x10   Ther Activity                        Gait Training                        Modalities CP 10' no adverse effects  CP 10' no adverse effects CP 10' no adverse effects CP 10' no adverse effects

## 2022-05-05 ENCOUNTER — OFFICE VISIT (OUTPATIENT)
Dept: PHYSICAL THERAPY | Facility: CLINIC | Age: 42
End: 2022-05-05
Payer: OTHER MISCELLANEOUS

## 2022-05-05 DIAGNOSIS — M75.02 ADHESIVE CAPSULITIS OF LEFT SHOULDER: Primary | ICD-10-CM

## 2022-05-05 PROCEDURE — 97110 THERAPEUTIC EXERCISES: CPT | Performed by: PHYSICAL MEDICINE & REHABILITATION

## 2022-05-05 PROCEDURE — 97140 MANUAL THERAPY 1/> REGIONS: CPT | Performed by: PHYSICAL MEDICINE & REHABILITATION

## 2022-05-05 NOTE — PROGRESS NOTES
Daily Note     Today's date: 2022  Patient name: Dirk Little  : 1980  MRN: [id number]  Referring provider: Ember Liao  Dx:   Encounter Diagnosis     ICD-10-CM    1  Adhesive capsulitis of left shoulder  M75 02                   Subjective: Pt notes his shoulder is a "little sore today"; "not sure why"  Notes it "just gets like that sometimes"  Feels he has maintained the gains in his shoulder ROM  No new sx/complaints  Objective: See treatment diary below      Assessment: Tolerated treatment well  Continues to maintain A/PROM gains made in PT  Continues to demonstrate gradual progress with end range PROM L GHJ flexion and ER; stiff end feels with creep  Pt cont to report "clicking" with AROM L shoulder, especially abduction  L shoulder flexion approaching normal limits  Abduction AROM remains limited by pain  Continues with limited and painful AROM L GHJ > 90*  No complaints after tx  Patient demonstrated fatigue post treatment and would benefit from continued PT       Plan: Continue per plan of care  Progress treatment as tolerated         Precautions: s/p L RTC repair, NO AROM X 6 weeks       Re-eval Date:      Date        Visit Count 31       FOTO        Pain In 3/10       Pain Out /10             Manuals        PROM L shoulder   GENTLE , inferior glides L shoulder to tolerance, posterior glides L shoulder to tolerance    Supine  10' total PROM L shoulder, LAD L GHJ                                Neuro Re-Ed                                                                Ther Ex        Ube warm up  Alt 90rpm  10' 80 rpm        Posterior capsule stretch      Self L shoulder caudal glide        Pulleys flexion/scaption        Finger ladder flexion/scaption Wall slides flexion /abduction  10x10-15"       Wall slides vs table slides flexion/abduction      Supine AAROM shoulder flexion MTP/LTP  Blue 3x10/3-5"    IR blue  32x10/3-5"    ER blue  2x10/3-5      Standing AROM  2x10/3-5' ea       Sleeper stretch      Supine 90/90 self ER stretch  Prone scap/hori abd/ext  2x10/3-5"        Prone row  2x10/3-5"        sidelying ER  2x10    sidelying Flex  2x10    sidelying ABD  2x10       Ther Activity                        Gait Training                        Modalities CP 10' no adverse effects

## 2022-05-10 ENCOUNTER — OFFICE VISIT (OUTPATIENT)
Dept: PHYSICAL THERAPY | Facility: CLINIC | Age: 42
End: 2022-05-10
Payer: OTHER MISCELLANEOUS

## 2022-05-10 DIAGNOSIS — M75.02 ADHESIVE CAPSULITIS OF LEFT SHOULDER: Primary | ICD-10-CM

## 2022-05-10 PROCEDURE — 97140 MANUAL THERAPY 1/> REGIONS: CPT

## 2022-05-10 PROCEDURE — 97110 THERAPEUTIC EXERCISES: CPT

## 2022-05-10 NOTE — PROGRESS NOTES
Daily Note     Today's date: 5/10/2022  Patient name: Clint Smith  : 1980  MRN: [id number]  Referring provider: Humera Puckett  Dx:   Encounter Diagnosis     ICD-10-CM    1  Adhesive capsulitis of left shoulder  M75 02        Start Time: 915  Stop Time:   Total time in clinic (min): 55 minutes    Subjective: "My shoulder feels pretty good this morning "      Objective: See treatment diary below      Assessment: Tolerated treatment well  Added light weight to scapular strengthening without incident  Slow improvements with AROM 2* weakness at end range  Pt noted joint pain during shoulder flexion; described as a chicken bone that is getting twisted out of the joint  PROM in flexion most painful, rotations are tight  Will continue to progress as able to address deficits  Patient demonstrated fatigue post treatment and would benefit from continued PT      Plan: Continue per plan of care  Progress treatment as tolerated         Precautions: s/p L RTC repair, NO AROM X 6 weeks       Re-eval Date:      Date 5/5 5/10      Visit Count 31 32      FOTO        Pain In 3/10 1-2      Pain Out 4/10             Manuals 5/5 5/10      PROM L shoulder   GENTLE , inferior glides L shoulder to tolerance, posterior glides L shoulder to tolerance    Supine  10' total PROM L shoulder, LAD L GHJ  Supine  10' total PROM L shoulder, LAD L GHJ                               Neuro Re-Ed                                                                Ther Ex        Ube warm up  Alt 90rpm  10' 80 rpm  10' 80 rpm       Posterior capsule stretch      Self L shoulder caudal glide        Pulleys flexion/scaption        Finger ladder flexion/scaption Wall slides flexion /abduction  10x10-15" Wall slides flexion /abduction  10x10-15"      Wall slides vs table slides flexion/abduction      Supine AAROM shoulder flexion MTP/LTP  Blue 3x10/3-5"    IR blue  32x10/3-5"    ER blue  2x10/3-5      Standing AROM  2x10/3-5' ea MTP/LTP  Blue 3x10/3-5"    IR blue  32x10/3-5"    ER blue  2x10/3-5      Standing AROM  2x10/3-5' ea      Sleeper stretch      Supine 90/90 self ER stretch  Prone scap/hori abd/ext  2x10/3-5" Prone scap/hori abd/ext  1# 2x10/3-5"       Prone row  2x10/3-5" Prone row  2# 2x10/3-5"       sidelying ER  2x10    sidelying Flex  2x10    sidelying ABD  2x10 sidelying ER  0# 2x10    sidelying Flex  0# 2x10    sidelying ABD  1# 2x10      Ther Activity                        Gait Training                        Modalities CP 10' no adverse effects

## 2022-05-12 ENCOUNTER — OFFICE VISIT (OUTPATIENT)
Dept: PHYSICAL THERAPY | Facility: CLINIC | Age: 42
End: 2022-05-12
Payer: OTHER MISCELLANEOUS

## 2022-05-12 DIAGNOSIS — M75.02 ADHESIVE CAPSULITIS OF LEFT SHOULDER: Primary | ICD-10-CM

## 2022-05-12 PROCEDURE — 97110 THERAPEUTIC EXERCISES: CPT | Performed by: PHYSICAL MEDICINE & REHABILITATION

## 2022-05-12 PROCEDURE — 97140 MANUAL THERAPY 1/> REGIONS: CPT | Performed by: PHYSICAL MEDICINE & REHABILITATION

## 2022-05-12 NOTE — PROGRESS NOTES
Daily Note     Today's date: 2022  Patient name: Karyn Enriquez  : 1980  MRN: [id number]  Referring provider: Jorge Byrnes  Dx:   Encounter Diagnosis     ICD-10-CM    1  Adhesive capsulitis of left shoulder  M75 02                   Subjective: Pt notes no new sx/complaints  Minimal soreness today  Cont to note intermittent sensation that his L shoulder wants to "pop" or "come out" of socket; noted this with steering his lawnmower  Cont to have pain and difficulty with overhead activity  Objective: See treatment diary below      Assessment: Tolerated treatment well  Progressing fairly with PREs  No decline in ROM or increase in pain since adding light resistance with PREs  Added 1# to SL shoulder flexion and ER today without incident  Notes cont'd discomfort with shoulder raises > 90*, especially abduction  Notes discomfort L GHJ with IR; feels like the shoulder is "sliding out and back in" last 1/3 ROM into IR  No complaints after tx  Does not feel he is regressing since adding resistance to program  Continues to be limited with lifting and overhead ADLs  Patient demonstrated fatigue post treatment and would benefit from continued PT to address cont'd ROM and strength deficits L GHJ  Plan: Continue per plan of care  Progress treatment as tolerated         Precautions: s/p L RTC repair, NO AROM X 6 weeks       Re-eval Date:      Date 5/5 5/10 5/12     Visit Count 31 32 33     FOTO        Pain In 3/10 1-2 1/10     Pain Out 4/10  2-3/10           Manuals 5/5 5/10 5/12     PROM L shoulder   GENTLE , inferior glides L shoulder to tolerance, posterior glides L shoulder to tolerance    Supine  10' total PROM L shoulder, LAD L GHJ  Supine  10' total PROM L shoulder, LAD L GHJ  Supine  10' total PROM L shoulder, LAD L GHJ   End range stretch all planes to tolerance                              Neuro Re-Ed                                                                Ther Ex        Jessi Lies warm up  Alt 90rpm  10' 80 rpm  10' 80 rpm  10' 80 rpm      Posterior capsule stretch      Self L shoulder caudal glide        Pulleys flexion/scaption        Finger ladder flexion/scaption Wall slides flexion /abduction  10x10-15" Wall slides flexion /abduction  10x10-15" Wall slides flexion /abduction  10x10-15"     Wall slides vs table slides flexion/abduction      Supine AAROM shoulder flexion MTP/LTP  Blue 3x10/3-5"    IR blue  32x10/3-5"    ER blue  2x10/3-5      Standing AROM  2x10/3-5' ea MTP/LTP  Blue 3x10/3-5"    IR blue  32x10/3-5"    ER blue  2x10/3-5      Standing AROM  2x10/3-5' ea MTP/LTP  Blue 3x10/3-5"    IR blue  32x10/3-5"    ER blue  2x10/3-5      Standing AROM  3x10/3-5' ea     Sleeper stretch      Supine 90/90 self ER stretch  Prone scap/hori abd/ext  2x10/3-5" Prone scap/hori abd/ext  1# 2x10/3-5" Prone scap/hori abd/ext  1# 2x10/3-5"      Prone row  2x10/3-5" Prone row  2# 2x10/3-5" Prone row  2# 2x10/3-5"      sidelying ER  2x10    sidelying Flex  2x10    sidelying ABD  2x10 sidelying ER  0# 2x10    sidelying Flex  0# 2x10    sidelying ABD  1# 2x10 sidelying ER  1# 2x10    sidelying Flex  1# 2x10    sidelying ABD  1# 2x10     Ther Activity                        Gait Training                        Modalities CP 10' no adverse effects  CP 10' no adverse effects

## 2022-05-17 ENCOUNTER — OFFICE VISIT (OUTPATIENT)
Dept: PHYSICAL THERAPY | Facility: CLINIC | Age: 42
End: 2022-05-17
Payer: OTHER MISCELLANEOUS

## 2022-05-17 DIAGNOSIS — M75.02 ADHESIVE CAPSULITIS OF LEFT SHOULDER: Primary | ICD-10-CM

## 2022-05-17 PROCEDURE — 97110 THERAPEUTIC EXERCISES: CPT

## 2022-05-17 PROCEDURE — 97112 NEUROMUSCULAR REEDUCATION: CPT

## 2022-05-17 PROCEDURE — 97140 MANUAL THERAPY 1/> REGIONS: CPT

## 2022-05-17 NOTE — PROGRESS NOTES
Daily Note     Today's date: 2022  Patient name: Sherlyn Tang  : 1980  MRN: [id number]  Referring provider: Alaina Wheeler  Dx:   Encounter Diagnosis     ICD-10-CM    1  Adhesive capsulitis of left shoulder  M75 02        Start Time: 745  Stop Time: 845  Total time in clinic (min): 60 minutes    Subjective: "I think I have more motion, I can lift it up higher but I still have the pain in the front "      Objective: See treatment diary below      Assessment: Tolerated treatment well  Able to increase weight performing prone scapular strengthening  Improved motion noted in all directions; anterior shoulder pain continues at end range  Pt RTD 22 and progress as able  Patient demonstrated fatigue post treatment and would benefit from continued PT      Plan: Continue per plan of care  Progress treatment as tolerated         Precautions: s/p L RTC repair, NO AROM X 6 weeks       Re-eval Date:      Date 5/5 5/10 5/12 5/17    Visit Count 31 32 33 34    FOTO        Pain In 3/10 1-2 1/10 1/10    Pain Out 4/10  2-3/10 2/10          Manuals 5/5 5/10 5/12     PROM L shoulder   GENTLE , inferior glides L shoulder to tolerance, posterior glides L shoulder to tolerance    Supine  10' total PROM L shoulder, LAD L GHJ  Supine  10' total PROM L shoulder, LAD L GHJ  Supine  10' total PROM L shoulder, LAD L GHJ   End range stretch all planes to tolerance  Supine  15' total PROM L shoulder, LAD L GHJ   End range stretch all planes to tolerance                             Neuro Re-Ed                                                                Ther Ex        Ube warm up  Alt 90rpm  10' 80 rpm  10' 80 rpm  10' 80 rpm  10' 80 rpm     Posterior capsule stretch      Self L shoulder caudal glide        Pulleys flexion/scaption        Finger ladder flexion/scaption Wall slides flexion /abduction  10x10-15" Wall slides flexion /abduction  10x10-15" Wall slides flexion /abduction  10x10-15" Wall slides flexion /abduction  10x10-15"    Wall slides vs table slides flexion/abduction      Supine AAROM shoulder flexion MTP/LTP  Blue 3x10/3-5"    IR blue  32x10/3-5"    ER blue  2x10/3-5      Standing AROM  2x10/3-5' ea MTP/LTP  Blue 3x10/3-5"    IR blue  32x10/3-5"    ER blue  2x10/3-5      Standing AROM  2x10/3-5' ea MTP/LTP  Blue 3x10/3-5"    IR blue  32x10/3-5"    ER blue  2x10/3-5      Standing AROM  3x10/3-5' ea MTP/LTP  Blue 3x10/3-5"    IR blue  32x10/3-5"    ER blue  2x10/3-5      Standing AROM  3x10/3-5' ea    Sleeper stretch      Supine 90/90 self ER stretch  Prone scap/hori abd/ext  2x10/3-5" Prone scap/hori abd/ext  1# 2x10/3-5" Prone scap/hori abd/ext  1# 2x10/3-5" Prone scap/hori abd/ext  2# 2x10/3-5"     Prone row  2x10/3-5" Prone row  2# 2x10/3-5" Prone row  2# 2x10/3-5" Prone row  2# 2x10/3-5"     sidelying ER  2x10    sidelying Flex  2x10    sidelying ABD  2x10 sidelying ER  0# 2x10    sidelying Flex  0# 2x10    sidelying ABD  1# 2x10 sidelying ER  1# 2x10    sidelying Flex  1# 2x10    sidelying ABD  1# 2x10 sidelying ER  2# 2x10    sidelying Flex  2# 2x10    sidelying ABD  2# 2x10    Ther Activity                        Gait Training                        Modalities CP 10' no adverse effects  CP 10' no adverse effects

## 2022-05-19 ENCOUNTER — OFFICE VISIT (OUTPATIENT)
Dept: PHYSICAL THERAPY | Facility: CLINIC | Age: 42
End: 2022-05-19
Payer: OTHER MISCELLANEOUS

## 2022-05-19 DIAGNOSIS — M75.02 ADHESIVE CAPSULITIS OF LEFT SHOULDER: Primary | ICD-10-CM

## 2022-05-19 PROCEDURE — 97110 THERAPEUTIC EXERCISES: CPT

## 2022-05-19 PROCEDURE — 97140 MANUAL THERAPY 1/> REGIONS: CPT

## 2022-05-19 PROCEDURE — 97112 NEUROMUSCULAR REEDUCATION: CPT

## 2022-05-19 NOTE — PROGRESS NOTES
Daily Note     Today's date: 2022  Patient name: Karyn Enriquez  : 1980  MRN: [id number]  Referring provider: Tim Wu  Dx:   Encounter Diagnosis     ICD-10-CM    1  Adhesive capsulitis of left shoulder  M75 02        Start Time: 915  Stop Time: 1010  Total time in clinic (min): 55 minutes    Subjective: Pt offered no concerns  Objective: See treatment diary below      Assessment: Tolerated treatment well  Pt continued with slow gains in motion and strength  Anterior shoulder pain continues at end range in all directions  Pt to RTD next week  Will continue to progress as able  Patient demonstrated fatigue post treatment and would benefit from continued PT      Plan: Continue per plan of care  Progress treatment as tolerated         Precautions: s/p L RTC repair, NO AROM X 6 weeks       Re-eval Date:      Date 5/5 5/10 5/12 5/17 5/19   Visit Count 31 32 33 34 35   FOTO        Pain In 3/10 1-2 1/10 1/10 1-2   Pain Out 4/10  2-3/10 2/10 1-2         Manuals 5/5 5/10 5/12  5/19   PROM L shoulder   GENTLE , inferior glides L shoulder to tolerance, posterior glides L shoulder to tolerance    Supine  10' total PROM L shoulder, LAD L GHJ  Supine  10' total PROM L shoulder, LAD L GHJ  Supine  10' total PROM L shoulder, LAD L GHJ   End range stretch all planes to tolerance  Supine  15' total PROM L shoulder, LAD L GHJ   End range stretch all planes to tolerance  Supine  15' total PROM L shoulder, LAD L GHJ   End range stretch all planes to tolerance                           Neuro Re-Ed                                                                Ther Ex        Ube warm up  Alt 90rpm  10' 80 rpm  10' 80 rpm  10' 80 rpm  10' 80 rpm  10' 70 rpm    Posterior capsule stretch      Self L shoulder caudal glide        Pulleys flexion/scaption        Finger ladder flexion/scaption Wall slides flexion /abduction  10x10-15" Wall slides flexion /abduction  10x10-15" Wall slides flexion /abduction  10x10-15" Wall slides flexion /abduction  10x10-15" resume   Wall slides vs table slides flexion/abduction      Supine AAROM shoulder flexion MTP/LTP  Blue 3x10/3-5"    IR blue  32x10/3-5"    ER blue  2x10/3-5      Standing AROM  2x10/3-5' ea MTP/LTP  Blue 3x10/3-5"    IR blue  32x10/3-5"    ER blue  2x10/3-5      Standing AROM  2x10/3-5' ea MTP/LTP  Blue 3x10/3-5"    IR blue  32x10/3-5"    ER blue  2x10/3-5      Standing AROM  3x10/3-5' ea MTP/LTP  Blue 3x10/3-5"    IR blue  32x10/3-5"    ER blue  2x10/3-5      Standing AROM  3x10/3-5' ea MTP/LTP  Blue 3x10/3-5"    IR blue  32x10/3-5"    ER blue  2x10/3-5      Standing AROM  3x10/3-5' ea   Sleeper stretch      Supine 90/90 self ER stretch  Prone scap/hori abd/ext  2x10/3-5" Prone scap/hori abd/ext  1# 2x10/3-5" Prone scap/hori abd/ext  1# 2x10/3-5" Prone scap/hori abd/ext  2# 2x10/3-5" Prone scap/hori abd/ext  2# 2x10/3-5"    Prone row  2x10/3-5" Prone row  2# 2x10/3-5" Prone row  2# 2x10/3-5" Prone row  2# 2x10/3-5" Prone row  2# 2x10/3-5"    sidelying ER  2x10    sidelying Flex  2x10    sidelying ABD  2x10 sidelying ER  0# 2x10    sidelying Flex  0# 2x10    sidelying ABD  1# 2x10 sidelying ER  1# 2x10    sidelying Flex  1# 2x10    sidelying ABD  1# 2x10 sidelying ER  2# 2x10    sidelying Flex  2# 2x10    sidelying ABD  2# 2x10 sidelying ER  2# 2x10    sidelying Flex  2# 2x10    sidelying ABD  2# 2x10   Ther Activity                        Gait Training                        Modalities CP 10' no adverse effects  CP 10' no adverse effects  CP 10' no adverse effects

## 2022-05-24 ENCOUNTER — OFFICE VISIT (OUTPATIENT)
Dept: PHYSICAL THERAPY | Facility: CLINIC | Age: 42
End: 2022-05-24
Payer: OTHER MISCELLANEOUS

## 2022-05-24 DIAGNOSIS — M75.02 ADHESIVE CAPSULITIS OF LEFT SHOULDER: Primary | ICD-10-CM

## 2022-05-24 PROCEDURE — 97110 THERAPEUTIC EXERCISES: CPT

## 2022-05-24 PROCEDURE — 97140 MANUAL THERAPY 1/> REGIONS: CPT

## 2022-05-24 PROCEDURE — 97112 NEUROMUSCULAR REEDUCATION: CPT

## 2022-05-24 NOTE — PROGRESS NOTES
Daily Note     Today's date: 2022  Patient name: Dirk Little  : 1980  MRN: [id number]  Referring provider: Kimberley Gonzalez  Dx:   Encounter Diagnosis     ICD-10-CM    1  Adhesive capsulitis of left shoulder  M75 02        Start Time:   Stop Time: 930  Total time in clinic (min): 55 minutes    Subjective: "I am going to the Dr on Thursday "      Objective: See treatment diary below      Assessment: Tolerated treatment well  Pt continues to improve motion and strength  Noted "catching" in anterior shoulder with returning to neutral   Will await recommendation to progress with therapy  Patient demonstrated fatigue post treatment and would benefit from continued PT      Plan: Continue per plan of care  Progress treatment as tolerated         Precautions: s/p L RTC repair, NO AROM X 6 weeks       Re-eval Date:      Date        Visit Count 36       FOTO NA       Pain In 2/10       Pain Out 3/10             Manuals        PROM L shoulder   GENTLE , inferior glides L shoulder to tolerance, posterior glides L shoulder to tolerance    Supine  15' total PROM L shoulder, LAD L GHJ   End range stretch all planes to tolerance                               Neuro Re-Ed                                                                Ther Ex        Ube warm up  Alt 90rpm  10' L3 alt       Posterior capsule stretch      Self L shoulder caudal glide        Pulleys flexion/scaption        Finger ladder flexion/scaption Wall slides flexion /abduction  10x10-15"       Wall slides vs table slides flexion/abduction      Supine AAROM shoulder flexion MTP/LTP  Black 2x10/3-5"    IR black  2x10/3-5"    ER black  2x10/3-5      Standing AROM  1# 2x10/3-5' ea       Sleeper stretch      Supine 90/90 self ER stretch  Prone scap/hori abd/ext  2# 2x10/3-5"        Prone row  2# 2x10/3-5"        sidelying ER  2# 2x10    sidelying Flex  2# 2x10    sidelying ABD  2# 2x10       Ther Activity Gait Training                        Modalities CP 10' no adverse effects

## 2022-05-26 ENCOUNTER — OFFICE VISIT (OUTPATIENT)
Dept: OBGYN CLINIC | Facility: OTHER | Age: 42
End: 2022-05-26
Payer: OTHER MISCELLANEOUS

## 2022-05-26 VITALS
WEIGHT: 313.4 LBS | BODY MASS INDEX: 36.26 KG/M2 | HEIGHT: 78 IN | HEART RATE: 114 BPM | DIASTOLIC BLOOD PRESSURE: 96 MMHG | SYSTOLIC BLOOD PRESSURE: 143 MMHG

## 2022-05-26 DIAGNOSIS — M75.02 ADHESIVE CAPSULITIS OF LEFT SHOULDER: Primary | ICD-10-CM

## 2022-05-26 PROCEDURE — 99213 OFFICE O/P EST LOW 20 MIN: CPT | Performed by: PHYSICIAN ASSISTANT

## 2022-05-26 NOTE — LETTER
May 26, 2022     Patient: Ajith Soliz  YOB: 1980  Date of Visit: 5/26/2022      To Whom it May Concern:    Mitch Marino is under my professional care  Berkley Dumont was seen in my office on 5/26/2022  Berkley Dumont may return to work with limitations : continue with light duty until follow up in 2 months  If you have any questions or concerns, please don't hesitate to call           Sincerely,          Hilton Gutierrez PA-C        CC: No Recipients

## 2022-05-26 NOTE — PROGRESS NOTES
Assessment  Diagnoses and all orders for this visit:    Adhesive capsulitis of left shoulder      Discussion and Plan:    Rolf Telles continues to make progress  He has 90 FF without any pain, and active to 145 with pain  Passively has has more  I think it will be good if therapy reintroduced strengthening but patient is aware that he should stop if he has pain  1  Continue with formal therapy  2  HEP - gentle stretching multiple times daily  3  Tylenol and Aleve as needed for pain  4  Activities to tolerance  5  Continue with Light Duty  6  Follow up in 2 months - sooner if problems arise    Subjective:   Patient ID: Carlos Winn is a 39 y o  male      Rolf Telles presents to the office in follow up of his left shoulder  He is 1 year out from rotator cuff repair performed by another surgeon  Rolf Telles developed post-operative adhesive capsulitis for which we have been following for 6 months  Rolf Telles received an intra-articular steroid injection at last visit 2 months ago  Reports good relief with that injection  He reports continued improvement in his left shoulder motion and pain  He denies any pain with activities below shoulder height  Pain occurs with rotation and overhead motion  Pain improves with rest and Aleve  Denies new injury or trauma  The following portions of the patient's history were reviewed and updated as appropriate: allergies, current medications, past family history, past medical history, past social history, past surgical history and problem list     Review of Systems   Constitutional: Negative for chills and fever  HENT: Negative for hearing loss  Eyes: Negative for visual disturbance  Respiratory: Negative for shortness of breath  Cardiovascular: Negative for chest pain  Gastrointestinal: Negative for abdominal pain  Musculoskeletal:        As reviewed in the HPI   Skin: Negative for rash     Neurological:        As reviewed in the HPI   Psychiatric/Behavioral: Negative for agitation  Objective:  /96 (BP Location: Right arm, Patient Position: Sitting, Cuff Size: Adult)   Pulse (!) 114   Ht 6' 6" (1 981 m)   Wt (!) 142 kg (313 lb 6 4 oz)   BMI 36 22 kg/m²       Right Shoulder Exam     Range of Motion   External rotation: 80       Left Shoulder Exam     Tenderness   The patient is experiencing no tenderness  Range of Motion   Passive abduction: 90   External rotation: 50   Forward flexion: 140 (passive 160)   Internal rotation 0 degrees: Lumbar     Muscle Strength   External rotation: 5/5     Tests   Drop arm: negative    Other   Erythema: absent  Sensation: normal  Pulse: present             Physical Exam  Constitutional:       Appearance: He is well-developed  HENT:      Head: Normocephalic  Pulmonary:      Effort: No respiratory distress  Breath sounds: No wheezing  Musculoskeletal:      Cervical back: Normal range of motion  Skin:     General: Skin is warm and dry  Neurological:      Mental Status: He is alert and oriented to person, place, and time  Psychiatric:         Behavior: Behavior normal          Thought Content:  Thought content normal          Judgment: Judgment normal

## 2022-05-31 ENCOUNTER — OFFICE VISIT (OUTPATIENT)
Dept: PHYSICAL THERAPY | Facility: CLINIC | Age: 42
End: 2022-05-31
Payer: OTHER MISCELLANEOUS

## 2022-05-31 DIAGNOSIS — M75.02 ADHESIVE CAPSULITIS OF LEFT SHOULDER: Primary | ICD-10-CM

## 2022-05-31 PROCEDURE — 97112 NEUROMUSCULAR REEDUCATION: CPT

## 2022-05-31 PROCEDURE — 97110 THERAPEUTIC EXERCISES: CPT

## 2022-05-31 PROCEDURE — 97140 MANUAL THERAPY 1/> REGIONS: CPT

## 2022-05-31 NOTE — PROGRESS NOTES
Daily Note     Today's date: 2022  Patient name: Sherlyn Tang  : 1980  MRN: [id number]  Referring provider: Alaina Wheeler  Dx:   Encounter Diagnosis     ICD-10-CM    1  Adhesive capsulitis of left shoulder  M75 02        Start Time: 915  Stop Time:   Total time in clinic (min): 55 minutes    Subjective: "I saw the PA-C and she was pleased with my motion  She said it will go either of 2 ways, improve motion or digress and need a manipulation and have my shoulder cleaned out  I can begin to strengthening but if it hurts, I am suppose to stop "      Objective: See treatment diary below      Assessment: Tolerated treatment well  Pt progressing well  Motion gain noted with end range pain  Increased weight with AROM exercises without incident  Will continue to progress as able to address deficits  Patient demonstrated fatigue post treatment and would benefit from continued PT      Plan: Continue per plan of care  Progress treatment as tolerated         Precautions: s/p L RTC repair, NO AROM X 6 weeks       Re-eval Date:      Date       Visit Count 36 37      FOTO NA       Pain In 2/10 1/10      Pain Out 3/10 1/10            Manuals       PROM L shoulder   GENTLE , inferior glides L shoulder to tolerance, posterior glides L shoulder to tolerance    Supine  15' total PROM L shoulder, LAD L GHJ   End range stretch all planes to tolerance Supine  15' total PROM L shoulder, LAD L GHJ   End range stretch all planes to tolerance                              Neuro Re-Ed        Ball on wall  4way red ball  10x ea      Wall push up plus  2x10/3-5"                                              Ther Ex        Ube warm up  Alt 90rpm  10' L3 alt 10' L3 alt      Posterior capsule stretch      Self L shoulder caudal glide        Pulleys flexion/scaption        Finger ladder flexion/scaption Wall slides flexion /abduction  10x10-15" Wall slides flexion /abduction  10x10-15"      Wall slides vs table slides flexion/abduction      Supine AAROM shoulder flexion MTP/LTP  Black 2x10/3-5"    IR black  2x10/3-5"    ER black  2x10/3-5      Standing AROM  1# 2x10/3-5' ea PNF NV                      Standing AROM  2# 2x10/3-5' ea      Sleeper stretch      Supine 90/90 self ER stretch  Prone scap/hori abd/ext  2# 2x10/3-5" Prone scap/hori abd/ext  2# 2x10/3-5"       Prone row  2# 2x10/3-5" Prone row  2# 2x10/3-5"       sidelying ER  2# 2x10    sidelying Flex  2# 2x10    sidelying ABD  2# 2x10 sidelying ER  2# 2x10    sidelying Flex  2# 2x10    sidelying ABD  2# 2x10      Ther Activity                        Gait Training                        Modalities CP 10' no adverse effects CP 10' no adverse effects

## 2022-06-02 ENCOUNTER — OFFICE VISIT (OUTPATIENT)
Dept: PHYSICAL THERAPY | Facility: CLINIC | Age: 42
End: 2022-06-02
Payer: OTHER MISCELLANEOUS

## 2022-06-02 DIAGNOSIS — M75.02 ADHESIVE CAPSULITIS OF LEFT SHOULDER: Primary | ICD-10-CM

## 2022-06-02 PROCEDURE — 97140 MANUAL THERAPY 1/> REGIONS: CPT

## 2022-06-02 PROCEDURE — 97112 NEUROMUSCULAR REEDUCATION: CPT

## 2022-06-02 PROCEDURE — 97110 THERAPEUTIC EXERCISES: CPT

## 2022-06-02 NOTE — PROGRESS NOTES
Daily Note     Today's date: 2022  Patient name: Raya Dumont  : 1980  MRN: [id number]  Referring provider: Kathleen Garcia  Dx:   Encounter Diagnosis     ICD-10-CM    1  Adhesive capsulitis of left shoulder  M75 02        Start Time: 745  Stop Time: 845  Total time in clinic (min): 60 minutes    Subjective: Pt offered no new concerns  Objective: See treatment diary below      Assessment: Tolerated treatment well  Progressed to PNF with resistance with good response  Extension pattern most difficulty 2* strength deficits  Good PROM in all directions with decreased end range pain and "catching" noted in anterior shoulder  Will continue to progress as able  Patient demonstrated fatigue post treatment and would benefit from continued PT      Plan: Continue per plan of care  Progress treatment as tolerated         Precautions: s/p L RTC repair, NO AROM X 6 weeks       Re-eval Date:      Date      Visit Count 36 37 38     FOTO NA       Pain In 2/10 1/10 1/10     Pain Out 3/10 1/10 1/10           Manuals      PROM L shoulder   GENTLE , inferior glides L shoulder to tolerance, posterior glides L shoulder to tolerance    Supine  15' total PROM L shoulder, LAD L GHJ   End range stretch all planes to tolerance Supine  15' total PROM L shoulder, LAD L GHJ   End range stretch all planes to tolerance Supine  15' total PROM L shoulder, LAD L GHJ   End range stretch all planes to tolerance                             Neuro Re-Ed        Ball on wall  4way red ball  10x ea 4way red ball  10x ea     Wall push up plus  2x10/3-5"                                              Ther Ex        Ube warm up  Alt 90rpm  10' L3 alt 10' L3 alt 10' L3 alt     Posterior capsule stretch      Self L shoulder caudal glide        Pulleys flexion/scaption        Finger ladder flexion/scaption Wall slides flexion /abduction  10x10-15" Wall slides flexion /abduction  10x10-15" Wall slides flexion /abduction  10x10-15"     Wall slides vs table slides flexion/abduction      Supine AAROM shoulder flexion MTP/LTP  Black 2x10/3-5"    IR black  2x10/3-5"    ER black  2x10/3-5      Standing AROM  1# 2x10/3-5' ea PNF NV                      Standing AROM  2# 2x10/3-5' ea grn 2x10/3-5"                      Standing AROM  2# 2x10/3-5' ea       Sleeper stretch      Supine 90/90 self ER stretch  Prone scap/hori abd/ext  2# 2x10/3-5" Prone scap/hori abd/ext  2# 2x10/3-5" Prone scap/hori abd/ext  3# 3x10/3-5"      Prone row  2# 2x10/3-5" Prone row  2# 2x10/3-5" Prone row  3# 2x10/3-5"      sidelying ER  2# 2x10    sidelying Flex  2# 2x10    sidelying ABD  2# 2x10 sidelying ER  2# 2x10    sidelying Flex  2# 2x10    sidelying ABD  2# 2x10 sidelying ER  3# 2x10    sidelying Flex  3# 2x10    sidelying ABD  3# 2x10     Ther Activity                        Gait Training                        Modalities CP 10' no adverse effects CP 10' no adverse effects CP 10' no adverse effects

## 2022-06-07 ENCOUNTER — OFFICE VISIT (OUTPATIENT)
Dept: PHYSICAL THERAPY | Facility: CLINIC | Age: 42
End: 2022-06-07
Payer: OTHER MISCELLANEOUS

## 2022-06-07 DIAGNOSIS — M75.02 ADHESIVE CAPSULITIS OF LEFT SHOULDER: Primary | ICD-10-CM

## 2022-06-07 PROCEDURE — 97110 THERAPEUTIC EXERCISES: CPT

## 2022-06-07 PROCEDURE — 97112 NEUROMUSCULAR REEDUCATION: CPT

## 2022-06-07 PROCEDURE — 97140 MANUAL THERAPY 1/> REGIONS: CPT

## 2022-06-07 NOTE — PROGRESS NOTES
Daily Note     Today's date: 2022  Patient name: Ajith Soliz  : 1980  MRN: [id number]  Referring provider: Eunice Villatoro  Dx:   Encounter Diagnosis     ICD-10-CM    1  Adhesive capsulitis of left shoulder  M75 02        Start Time: 920  Stop Time:   Total time in clinic (min): 55 minutes    Subjective: "My shoulder is feeling better but it feels tighter today "      Objective: See treatment diary below      Assessment: Tolerated treatment well  Pt is progressing with strength, however challenged with motions above shoulder with extended arm  Tightness noted at end range in all directions today without loss of motion  Will continue to progress as able to address deficits  Patient demonstrated fatigue post treatment and would benefit from continued PT      Plan: Continue per plan of care  Progress treatment as tolerated         Precautions: s/p L RTC repair, NO AROM X 6 weeks       Re-eval Date:      Date     Visit Count 36 37 38 39    FOTO NA       Pain In 2/10 1/10 1/10 1/10    Pain Out 3/10 1/10 1/10 1/10          Manuals     PROM L shoulder   GENTLE , inferior glides L shoulder to tolerance, posterior glides L shoulder to tolerance    Supine  15' total PROM L shoulder, LAD L GHJ   End range stretch all planes to tolerance Supine  15' total PROM L shoulder, LAD L GHJ   End range stretch all planes to tolerance Supine  15' total PROM L shoulder, LAD L GHJ   End range stretch all planes to tolerance Supine  15' total PROM L shoulder, LAD L GHJ   End range stretch all planes to tolerance                            Neuro Re-Ed        Ball on wall  4way red ball  10x ea 4way red ball  10x ea 4way red ball  10x ea    Wall push up plus  2x10/3-5"                                              Ther Ex        Ube warm up  Alt 90rpm  10' L3 alt 10' L3 alt 10' L3 alt 70 RPM  10'    Posterior capsule stretch      Self L shoulder caudal glide        Pulleys flexion/scaption        Finger ladder flexion/scaption Wall slides flexion /abduction  10x10-15" Wall slides flexion /abduction  10x10-15" Wall slides flexion /abduction  10x10-15" Reviewed HEP    Wall slides vs table slides flexion/abduction      Supine AAROM shoulder flexion MTP/LTP  Black 2x10/3-5"    IR black  2x10/3-5"    ER black  2x10/3-5      Standing AROM  1# 2x10/3-5' ea PNF NV                      Standing AROM  2# 2x10/3-5' ea grn 2x10/3-5"                      Standing AROM  2# 2x10/3-5' ea   grn 2x10/3-5"                    Standing AROM  2# 2x10/3-5' ea    Sleeper stretch      Supine 90/90 self ER stretch  Prone scap/hori abd/ext  2# 2x10/3-5" Prone scap/hori abd/ext  2# 2x10/3-5" Prone scap/hori abd/ext  3# 3x10/3-5" Prone scap/hori abd/ext  3# 3x10/3-5"     Prone row  2# 2x10/3-5" Prone row  2# 2x10/3-5" Prone row  3# 2x10/3-5" Prone row  3# 2x10/3-5"     sidelying ER  2# 2x10    sidelying Flex  2# 2x10    sidelying ABD  2# 2x10 sidelying ER  2# 2x10    sidelying Flex  2# 2x10    sidelying ABD  2# 2x10 sidelying ER  3# 2x10    sidelying Flex  3# 2x10    sidelying ABD  3# 2x10 sidelying ER  3# 2x10    sidelying Flex  3# 2x10    sidelying ABD  3# 2x10    Ther Activity                        Gait Training                        Modalities CP 10' no adverse effects CP 10' no adverse effects CP 10' no adverse effects CP 10' no adverse effects

## 2022-06-09 ENCOUNTER — EVALUATION (OUTPATIENT)
Dept: PHYSICAL THERAPY | Facility: CLINIC | Age: 42
End: 2022-06-09
Payer: OTHER MISCELLANEOUS

## 2022-06-09 DIAGNOSIS — M75.02 ADHESIVE CAPSULITIS OF LEFT SHOULDER: Primary | ICD-10-CM

## 2022-06-09 PROCEDURE — 97110 THERAPEUTIC EXERCISES: CPT

## 2022-06-09 PROCEDURE — 97112 NEUROMUSCULAR REEDUCATION: CPT

## 2022-06-09 PROCEDURE — 97140 MANUAL THERAPY 1/> REGIONS: CPT

## 2022-06-09 NOTE — PROGRESS NOTES
PT Re-Evaluation     Today's date: 2022  Patient name: Miguel Ángel Ford  : 1980   MRN: [id number]  Referring provider: Rosalina Ernandez  Dx:   Encounter Diagnosis     ICD-10-CM    1  Adhesive capsulitis of left shoulder  M75 02        Start Time: 740  Stop Time: 845  Total time in clinic (min): 65 minutes    Assessment  Assessment details: Miguel Ángel Ford is a 39 y o  male returning to outpatient physical therapy with noted impairments including cont'd L shoulder pain, impaired soft tissue mobility, reduced range of motion, reduced strength, reduced postural awareness, and reduced activity tolerance  Signs and symptoms at present are consistent with referring diagnosis of L should adhesive capsulitis s/p RTC repair 2021  Pt has been compliant with PT and HEP  He has cont'd to make gradual progress with his L shoulder/GHJ mobility with improvements in active and passive ROM  Most notable improvements have been made in L shoulder abduction and ER ROM vs prior sessions  Continues with improvements in PROM; AROM remains about the same as last RE  AROM L GHJ remains limited by pt c/o pain, especially forward flexion  He continues with weakness with overhead movements of L shoulder with pain  He is progressing fairly well with PREs in PT without exacerbation of pain/sx or decline in ROM/progress  He remains limited with ADLs involving overhead ROM with pain  He would benefit from cont'd skilled OPPT in order to improve strength and stability of L GHJ (especially overhead) and restore max ROM of L GHJ in order to allow pt full return to PLOF with reduced limitations  Pt in agreement with no questions/concerns  Impairments: abnormal muscle firing, abnormal muscle tone, abnormal or restricted ROM, activity intolerance, impaired physical strength and pain with function    Symptom irritability: lowUnderstanding of Dx/Px/POC: good   Prognosis: fair    Goals  STGs to be achieved in 2-4 weeks:  1   Pt to demonstrate reduced subjective pain rating "at worst" by at least 2-3 points from Initial Eval in order to allow for reduced pain with ADLs and improved functional activity tolerance  - met   2  Pt to demonstrate grossly WFL PROM L shoulder without increase in pain/sx  - progressing    3  Pt will increase L shoulder AROM elevation to at least 150* without increased pain or compensatory strategies  Met pROM, progressing AROM - METl increase AROM elevation to at least 160-165* in 2-4 weeks to improve functional use of L UE with ADLs - progressing       LTGs to be achieved in 9-12 weeks:  1  Pt will be I with HEP in order to continue to improve quality of life and independence and reduce risk for re-injury  - met   2  Pt to demonstrate return to ADLs and work without limitations or restrictions  - partially met, limited above 90* - continue   3  Pt to demonstrate improved function as noted by achieving or exceeding predicted score on FOTO outcomes assessment tool  - progressing     Plan  Patient would benefit from: skilled physical therapy  Planned modality interventions: cryotherapy and thermotherapy: hydrocollator packs  Planned therapy interventions: joint mobilization, manual therapy, motor coordination training, neuromuscular re-education, patient education, stretching, therapeutic activities, therapeutic exercise, functional ROM exercises and home exercise program  Frequency: 2x week  Duration in visits: 12  Duration in weeks: 6  Plan of Care beginning date: 6/9/2022  Plan of Care expiration date: 7/21/2022  Treatment plan discussed with: patient        Subjective Evaluation    History of Present Illness  Mechanism of injury: surgery  Mechanism of injury: Pt reports he feels he continues to progress with PT  He notes he feels he has been having less pain in his shoulder and improving use of shoulder with ADLs  Notes improvements in A/PROM in PT   Continues to note intermittent "random" pains in L shoulder; pain 1* anterior L shoulder; can be dull/aching; other times can be random and sharp /shooting  Notes pain with end ROM with stretching  Moved his arm quickly the other day and had a "pop" and some "sharp pain"  Cont intermittent catching and popping L GHJ however notes this occurs less often  Continues to have good days/bad days  Moving arm better out to the side, but continues with most pain and limited motion raising the arm straight forward  Denies any new sx/complaints  Continues with pain and difficulty raising L arm > 90*; struggles to put things away or takes things out of cabinets; pain and weakness L shoulder per pt  RTD in July  Quality of life: good    Pain  Current pain ratin  At best pain ratin (at rest)  At worst pain ratin (Brief periods of 6-8/10 that come/go, lasts briefly )  Location: L shoulder  Quality: dull ache, tight, burning and sharp  Relieving factors: rest, support and ice  Progression: improved    Social Support  Steps to enter house: yes  2  Stairs in house: no   Lives in: [facility]  Lives with: spouse    Employment status: not working ([employer])  Hand dominance: right    Treatments  Previous treatment: physical therapy, injection treatment and medication  Current treatment: medication and physical therapy  Current treatment comments: s/p RTC repair  Patient Goals  Patient goals for therapy: decreased pain, increased motion, increased strength, independence with ADLs/IADLs, return to sport/leisure activities and return to work          Objective     Postural Observations  Seated posture: fair  Standing posture: fair    Additional Postural Observation Details  Forward head/rounded shoulders  Increased thoracic kyphosis   B scapular depression and protraction with mild winging     L scapular depression and winging mildly more noticeable L vs R - improving/more symmetrical           Observations   Left Shoulder   Positive for incision  Negative for edema       Additional Observation Details    "Cyst" noted L superior medial shoulder/UT region; pt notes this is chronic   Incisions healed/closed L shoulder         Cervical/Thoracic Screen   Cervical range of motion within normal limits with the following exceptions: Grossly WFL without pain/sx        Neurological Testing     Sensation     Shoulder   Left Shoulder   Intact: light touch    Right Shoulder   Intact: light touch    Additional Neurological Details  Sensation intact to light touch     Active Range of Motion   Left Shoulder   Flexion: 150 degrees with pain  Abduction: 155 degrees with pain  External rotation 0°: WFL and with pain  External rotation BTH: T3 with pain  Internal rotation 0°: Left shoulder active internal rotation at 0 degrees: to body     Internal rotation BTB: T9 with pain    Additional Active Range of Motion Details  Pain and apprehension approaching end ROM > 90* elevation - continues , less painful, more stiff per pt, most pain with forward flexion and IR/ER at end ranges    Burning pain L GHJ if he "moves it too much"; just sore now per pt; more pain with ROM > 90* approaching end ranges , especially flexion   Pain local to L GHJ/anterior shoulder  Mild scapular /shoulder elevation with end ROM ;will cont to monitor ; especially noted with abduction - resolved     Passive Range of Motion   Left Shoulder   Flexion: 155 degrees with pain  Abduction: 180 degrees with pain  External rotation 90°: 80 degrees with pain  Internal rotation 90°: 75 degrees with pain    Additional Passive Range of Motion Details  Limited end ROM with apprehension/guarding- improved   Mildly stiff end feel with creep; limited by guarding - improved, stiff end feels with creep, limited  By pain ; more painful with flexion than abduction per pt with pain approaching end range and increased at end range - improved/less painful, continues to be stiff/tight at end ranges , most discomfort with end range flexion and ER with slower progress/improvement   Improved tolerance for gentle stretching /end ROM vs prior PT sessions with manual tx, especially past 2 weeks   Will cont to assess      L elbow AROM/PROM WNL         Joint Play   Left Shoulder  Hypomobile in the posterior capsule and inferior capsule      Additional Joint Play Details  Gross hypomobility L GHJ as noted above - limited by stiffness and mm guarding       Strength/Myotome Testing     Left Shoulder     Planes of Motion   Flexion: 4 (pain)   Abduction: 4 (4-4+)   External rotation at 0°: 4+   Internal rotation at 0°: 4+     Isolated Muscles   Biceps: 4+   Upper trapezius: 4+     Additional Strength Details  MMT assessed with break test with arm at side IR/ER and mid range elevation to pt tolerance  Pain with MMT flexion > abduction, most painful with resisted flexion   No pain with IR/ER   Continues to generally report flexion of L shoulder is more painful than abduction       L wrist grossly 4+/5  L hand grossly 4+/5    L elbow 4+-5/5 flexion without pain/sx; extension 4+/5 with shoulder pain                Precautions: s/p L RTC repair, NO AROM X 6 weeks       Re-eval Date: 6/7     Date 4/19 4/21 4/26     Visit Count 26 27 28     FOTO        Pain In   0/10     Pain Out   3/10           Manuals 4/19 4/21 4/26     PROM L shoulder   GENTLE , inferior glides L shoulder to tolerance, posterior glides L shoulder to tolerance    Supine  15' total PROM L shoulder Supine  15' total PROM L shoulder Supine  10' total PROM L shoulder                             Neuro Re-Ed                                                                Ther Ex        Ube warm up  Alt 90rpm  10'  70 RPM  10'  70 RPM  10' 60 rpm      Posterior capsule stretch      Self L shoulder caudal glide        Pulleys flexion/scaption        Finger ladder flexion/scaption Wall slides flexion /abduction  10x10-15" Wall slides flexion /abduction  10x10-15" Wall slides flexion /abduction  10x10-15"     Wall slides vs table slides flexion/abduction      Supine AAROM shoulder flexion MTP/LTP  grn 3x10/3-5"          Standing AROM  2x10/3-5' MTP/LTP  grn 3x10/3-5"            Standing AROM  2x10/3-5'                 Standing AROM  2x10/3-5'     Sleeper stretch      Supine 90/90 self ER stretch  Prone scap/hori abd/ext  2x10/3-5" Prone scap/hori abd/ext  2x10/3-5" Prone scap/hori abd/ext  2x10/3-5"      Prone row  2x10/3-5" Prone row  2x10/3-5" Prone row  2x10/3-5"       sidelying ER  2x10    sidelying Flex  2x10    sidelying ABD  2x10 sidelying ER  2x10    sidelying Flex  2x10    sidelying ABD  2x10     Ther Activity                        Gait Training                        Modalities CP 10' no adverse effects  CP 10' no adverse effects

## 2022-06-09 NOTE — PROGRESS NOTES
Daily Note     Today's date: 2022  Patient name: Kiel Perez  : 1980  MRN: [id number]  Referring provider: Erika Oneil  Dx:   Encounter Diagnosis     ICD-10-CM    1  Adhesive capsulitis of left shoulder  M75 02        Start Time: 740  Stop Time: 845  Total time in clinic (min): 65 minutes    Subjective: "My arm is feeling better  I can put my arm up over my head in bed and it doesn't hurt as much "      Objective: See treatment diary below      Assessment: Tolerated treatment well  Pt continues to improve with strength and motion  Pain levels have been slowly decreasing since starting strengthening  Refer to reassessment for full details  Will continue as able  Patient demonstrated fatigue post treatment and would benefit from continued PT      Plan: Continue per plan of care  Progress treatment as tolerated    Pt will be OOT x1 week and return to therapy 22     Precautions: s/p L RTC repair, NO AROM X 6 weeks       Re-eval Date:      Date    Visit Count 36 37 38 39 40   FOTO NA       Pain In 2/10 1/10 1/10 1/10 1/10   Pain Out 3/10 1/10 1/10 1/10          Manuals    PROM L shoulder   GENTLE , inferior glides L shoulder to tolerance, posterior glides L shoulder to tolerance    Supine  15' total PROM L shoulder, LAD L GHJ   End range stretch all planes to tolerance Supine  15' total PROM L shoulder, LAD L GHJ   End range stretch all planes to tolerance Supine  15' total PROM L shoulder, LAD L GHJ   End range stretch all planes to tolerance Supine  15' total PROM L shoulder, LAD L GHJ   End range stretch all planes to tolerance                            Neuro Re-Ed        Ball on wall  4way red ball  10x ea 4way red ball  10x ea 4way red ball  10x ea 4way red ball cueing for scap retraction  10x ea   Wall push up plus  2x10/3-5"   2x10/3-5" cues for retraction                                           Ther Ex        Ube warm up  Alt 90rpm  10' L3 alt 10' L3 alt 10' L3 alt 70 RPM  10' 70 RPM  10'   Posterior capsule stretch      Self L shoulder caudal glide        Pulleys flexion/scaption        Finger ladder flexion/scaption Wall slides flexion /abduction  10x10-15" Wall slides flexion /abduction  10x10-15" Wall slides flexion /abduction  10x10-15" Reviewed HEP    Wall slides vs table slides flexion/abduction      Supine AAROM shoulder flexion MTP/LTP  Black 2x10/3-5"    IR black  2x10/3-5"    ER black  2x10/3-5      Standing AROM  1# 2x10/3-5' ea PNF NV                      Standing AROM  2# 2x10/3-5' ea grn 2x10/3-5"                      Standing AROM  2# 2x10/3-5' ea   grn 2x10/3-5"                    Standing AROM  2# 2x10/3-5' ea blue 2x10/3-5"                    Standing AROM  2# 2x10/3-5' ea   Sleeper stretch      Supine 90/90 self ER stretch  Prone scap/hori abd/ext  2# 2x10/3-5" Prone scap/hori abd/ext  2# 2x10/3-5" Prone scap/hori abd/ext  3# 3x10/3-5" Prone scap/hori abd/ext  3# 3x10/3-5" Prone scap/hori abd/ext  3# 2-3x10/3-5"    Prone row  2# 2x10/3-5" Prone row  2# 2x10/3-5" Prone row  3# 2x10/3-5" Prone row  3# 2x10/3-5" Prone row  3# 2-3x10/3-5"    sidelying ER  2# 2x10    sidelying Flex  2# 2x10    sidelying ABD  2# 2x10 sidelying ER  2# 2x10    sidelying Flex  2# 2x10    sidelying ABD  2# 2x10 sidelying ER  3# 2x10    sidelying Flex  3# 2x10    sidelying ABD  3# 2x10 sidelying ER  3# 2x10    sidelying Flex  3# 2x10    sidelying ABD  3# 2x10 sidelying ER  3# 2-3x10    sidelying Flex  3# 2-3x10    sidelying ABD  3# 2-3x10   Ther Activity                        Gait Training                        Modalities CP 10' no adverse effects CP 10' no adverse effects CP 10' no adverse effects CP 10' no adverse effects

## 2022-06-21 ENCOUNTER — OFFICE VISIT (OUTPATIENT)
Dept: PHYSICAL THERAPY | Facility: CLINIC | Age: 42
End: 2022-06-21
Payer: OTHER MISCELLANEOUS

## 2022-06-21 DIAGNOSIS — M75.02 ADHESIVE CAPSULITIS OF LEFT SHOULDER: Primary | ICD-10-CM

## 2022-06-21 PROCEDURE — 97140 MANUAL THERAPY 1/> REGIONS: CPT | Performed by: PHYSICAL MEDICINE & REHABILITATION

## 2022-06-21 PROCEDURE — 97112 NEUROMUSCULAR REEDUCATION: CPT | Performed by: PHYSICAL MEDICINE & REHABILITATION

## 2022-06-21 PROCEDURE — 97110 THERAPEUTIC EXERCISES: CPT | Performed by: PHYSICAL MEDICINE & REHABILITATION

## 2022-06-21 NOTE — PROGRESS NOTES
Daily Note     Today's date: 2022  Patient name: Addison Finney  : 1980  MRN: [id number]  Referring provider: Leticia Norton  Dx:   Encounter Diagnosis     ICD-10-CM    1  Adhesive capsulitis of left shoulder  M75 02                   Subjective: Pt notes he was compliant with exercises on vacation  Notes overall he feels his shoulder is improving  No longer constantly painful  Notes improvements in ROm and strength L shoulder  No new sx/complaints  Objective: See treatment diary below      Assessment: Tolerated treatment well  Pt has maintained PROM L shoulder from prior sessions  Min tightness persists into end range ER and pure abduction> flexion  Otherwise PROM is grossly WFL  Progressing well with PREs; able to increase weights/reps with PREs with reduced pain and reduced fatigue  Minimal soreness after tx  Cont to report difficulty with overhead activity with L Ue  Patient demonstrated fatigue post treatment and would benefit from continued PT      Plan: Continue per plan of care  Progress treatment as tolerated             Precautions: s/p L RTC repair, NO AROM X 6 weeks       Re-eval Date:     Date        Visit Count 41       FOTO        Pain In 0/10       Pain Out 3/10             Manuals        PROM L shoulder   GENTLE , inferior glides L shoulder to tolerance, posterior glides L shoulder to tolerance    Supine  10' total PROM L shoulder, LAD L GHJ   End range stretch all planes to tolerance                               Neuro Re-Ed        Ball on wall 4way red ball cueing for scap retraction  10x ea       Wall push up plus 3x10/3-5" cues for retraction                                               Ther Ex        Ube warm up  Alt 90rpm  80 rpm alt 10'        Posterior capsule stretch      Self L shoulder caudal glide        Pulleys flexion/scaption        Finger ladder flexion/scaption        Wall slides vs table slides flexion/abduction      Supine AAROM shoulder flexion MTP/LTP  Blue 30 x ea /3-5"    IR black  3x10/3-5"    ER black  3x10/3-5      Standing AROM  2# 2x10/3-5' ea       Sleeper stretch      Supine 90/90 self ER stretch  Prone scap/hori abd/ext  3# 3x10/3-5"ea        Prone row  3# 3x10/3-5"        sidelying ER  3# 3x10    sidelying Flex  3# 3x10    sidelying ABD  3# 3x10       Ther Activity                        Gait Training                        Modalities CP 10' no adverse effects

## 2022-06-23 ENCOUNTER — OFFICE VISIT (OUTPATIENT)
Dept: PHYSICAL THERAPY | Facility: CLINIC | Age: 42
End: 2022-06-23
Payer: OTHER MISCELLANEOUS

## 2022-06-23 DIAGNOSIS — M75.02 ADHESIVE CAPSULITIS OF LEFT SHOULDER: Primary | ICD-10-CM

## 2022-06-23 PROCEDURE — 97110 THERAPEUTIC EXERCISES: CPT

## 2022-06-23 PROCEDURE — 97112 NEUROMUSCULAR REEDUCATION: CPT

## 2022-06-23 PROCEDURE — 97140 MANUAL THERAPY 1/> REGIONS: CPT

## 2022-06-23 NOTE — PROGRESS NOTES
Daily Note     Today's date: 2022  Patient name: Len Francis  : 1980  MRN: [id number]  Referring provider: Connor Hawk  Dx:   Encounter Diagnosis     ICD-10-CM    1  Adhesive capsulitis of left shoulder  M75 02        Start Time: 910  Stop Time: 1005  Total time in clinic (min): 55 minutes    Subjective: "My shoulder is a little more sore this morning "      Objective: See treatment diary below      Assessment: Tolerated treatment well  Motion and strength continue to improve  End range tightness noted with minimal pain  Strength deficits noted OH and at end range  Will continue to progress as able to address deficits  Patient demonstrated fatigue post treatment and would benefit from continued PT      Plan: Continue per plan of care  Progress treatment as tolerated         Precautions: s/p L RTC repair, NO AROM X 6 weeks       Re-eval Date:     Date       Visit Count 41 42      FOTO        Pain In 0/10       Pain Out 3/10             Manuals       PROM L shoulder   GENTLE , inferior glides L shoulder to tolerance, posterior glides L shoulder to tolerance    Supine  10' total PROM L shoulder, LAD L GHJ   End range stretch all planes to tolerance                               Neuro Re-Ed        Ball on wall 4way red ball cueing for scap retraction  10x ea 4way red ball cueing for scap retraction  10x ea      Wall push up plus 3x10/3-5" cues for retraction 3x10/3-5" cues for retraction                                              Ther Ex        Ube warm up  Alt 90rpm  80 rpm alt 10'  80 rpm alt 10'       Posterior capsule stretch      Self L shoulder caudal glide        Pulleys flexion/scaption        Finger ladder flexion/scaption        Wall slides vs table slides flexion/abduction      Supine AAROM shoulder flexion MTP/LTP  Blue 30 x ea /3-5"    IR black  3x10/3-5"    ER black  3x10/3-5      Standing AROM  2# 2x10/3-5' ea MTP/LTP  Blue 30 x ea /3-5"    IR black  3x10/3-5"    ER black  3x10/3-5      Standing AROM  2# 2x10/3-5' ea PNF     Sleeper stretch      Supine 90/90 self ER stretch  Prone scap/hori abd/ext  3# 3x10/3-5"ea        Prone row  3# 3x10/3-5"        sidelying ER  3# 3x10    sidelying Flex  3# 3x10    sidelying ABD  3# 3x10       Ther Activity                        Gait Training                        Modalities CP 10' no adverse effects

## 2022-06-28 ENCOUNTER — OFFICE VISIT (OUTPATIENT)
Dept: PHYSICAL THERAPY | Facility: CLINIC | Age: 42
End: 2022-06-28
Payer: OTHER MISCELLANEOUS

## 2022-06-28 ENCOUNTER — APPOINTMENT (OUTPATIENT)
Dept: LAB | Facility: CLINIC | Age: 42
End: 2022-06-28
Payer: COMMERCIAL

## 2022-06-28 DIAGNOSIS — I10 HYPERTENSION, UNSPECIFIED TYPE: ICD-10-CM

## 2022-06-28 DIAGNOSIS — M75.02 ADHESIVE CAPSULITIS OF LEFT SHOULDER: Primary | ICD-10-CM

## 2022-06-28 LAB
ANION GAP SERPL CALCULATED.3IONS-SCNC: 7 MMOL/L (ref 4–13)
BUN SERPL-MCNC: 12 MG/DL (ref 5–25)
CALCIUM SERPL-MCNC: 9.6 MG/DL (ref 8.3–10.1)
CHLORIDE SERPL-SCNC: 103 MMOL/L (ref 100–108)
CHOLEST SERPL-MCNC: 208 MG/DL
CO2 SERPL-SCNC: 24 MMOL/L (ref 21–32)
CREAT SERPL-MCNC: 1.12 MG/DL (ref 0.6–1.3)
GFR SERPL CREATININE-BSD FRML MDRD: 81 ML/MIN/1.73SQ M
GLUCOSE P FAST SERPL-MCNC: 95 MG/DL (ref 65–99)
HDLC SERPL-MCNC: 47 MG/DL
LDLC SERPL CALC-MCNC: 99 MG/DL (ref 0–100)
NONHDLC SERPL-MCNC: 161 MG/DL
POTASSIUM SERPL-SCNC: 4.1 MMOL/L (ref 3.5–5.3)
SODIUM SERPL-SCNC: 134 MMOL/L (ref 136–145)
TRIGL SERPL-MCNC: 311 MG/DL

## 2022-06-28 PROCEDURE — 36415 COLL VENOUS BLD VENIPUNCTURE: CPT

## 2022-06-28 PROCEDURE — 80048 BASIC METABOLIC PNL TOTAL CA: CPT

## 2022-06-28 PROCEDURE — 97112 NEUROMUSCULAR REEDUCATION: CPT

## 2022-06-28 PROCEDURE — 97140 MANUAL THERAPY 1/> REGIONS: CPT

## 2022-06-28 PROCEDURE — 80061 LIPID PANEL: CPT

## 2022-06-28 PROCEDURE — 97110 THERAPEUTIC EXERCISES: CPT

## 2022-06-28 NOTE — PROGRESS NOTES
Daily Note     Today's date: 2022  Patient name: Margarita Messer  : 1980  MRN: [id number]  Referring provider: Richard Sandhu  Dx:   Encounter Diagnosis     ICD-10-CM    1  Adhesive capsulitis of left shoulder  M75 02        Start Time: 910  Stop Time:   Total time in clinic (min): 60 minutes    Subjective: "I think my shoulder is getting better and I think I will be able to continue with on my own  I am concerned that my R shoulder is starting to sounds like rice crispies "      Objective: See treatment diary below      Assessment: Tolerated treatment well  Able to increase resistance and weight on various exercises without incident  Pt continues to improve motion and strength actively and passively with decreased pain  Will being to transition to HEP in upcoming session  Patient demonstrated fatigue post treatment and would benefit from continued PT      Plan: Continue per plan of care  Progress treatment as tolerated         Precautions: s/p L RTC repair, NO AROM X 6 weeks       Re-eval Date:     Date      Visit Count 41 42 43     FOTO        Pain In 0/10       Pain Out 3/10             Manuals      PROM L shoulder   GENTLE , inferior glides L shoulder to tolerance, posterior glides L shoulder to tolerance    Supine  10' total PROM L shoulder, LAD L GHJ   End range stretch all planes to tolerance  Supine  10' total PROM L shoulder, LAD L GHJ   End range stretch all planes to tolerance                             Neuro Re-Ed        Ball on wall 4way red ball cueing for scap retraction  10x ea 4way red ball cueing for scap retraction  10x ea 4way red ball cueing for scap retraction  10x ea     Wall push up plus 3x10/3-5" cues for retraction 3x10/3-5" cues for retraction 3x10/3-5" cues for retraction                                             Ther Ex        Ube warm up  Alt 90rpm  80 rpm alt 10'  80 rpm alt 10'  80 rpm alt 10'      Posterior capsule stretch      Self L shoulder caudal glide        Pulleys flexion/scaption        Finger ladder flexion/scaption        Wall slides vs table slides flexion/abduction      Supine AAROM shoulder flexion MTP/LTP  Blue 30 x ea /3-5"    IR black  3x10/3-5"    ER black  3x10/3-5      Standing AROM  2# 2x10/3-5' ea MTP/LTP  Blue 30 x ea /3-5"    IR black  3x10/3-5"    ER black  3x10/3-5      Standing AROM  2# 2x10/3-5' ea PNF  Blue  2x10/3-5"                  Standing AROM  2# 2x10/3-5' ea     Sleeper stretch      Supine 90/90 self ER stretch  Prone scap/hori abd/ext  3# 3x10/3-5"ea  Prone scap/hori abd/ext  3# 3x10/3-5"ea      Prone row  3# 3x10/3-5"  Prone row  3# 3x10/3-5"      sidelying ER  3# 3x10    sidelying Flex  3# 3x10    sidelying ABD  3# 3x10  sidelying ER  3# 3x10    sidelying Flex  3# 3x10    sidelying ABD  3# 3x10     Ther Activity                        Gait Training                        Modalities CP 10' no adverse effects  CP 10' no adverse effects

## 2022-06-30 ENCOUNTER — OFFICE VISIT (OUTPATIENT)
Dept: PHYSICAL THERAPY | Facility: CLINIC | Age: 42
End: 2022-06-30
Payer: OTHER MISCELLANEOUS

## 2022-06-30 DIAGNOSIS — M75.02 ADHESIVE CAPSULITIS OF LEFT SHOULDER: Primary | ICD-10-CM

## 2022-06-30 PROCEDURE — 97140 MANUAL THERAPY 1/> REGIONS: CPT

## 2022-06-30 PROCEDURE — 97110 THERAPEUTIC EXERCISES: CPT

## 2022-06-30 PROCEDURE — 97112 NEUROMUSCULAR REEDUCATION: CPT

## 2022-06-30 NOTE — PROGRESS NOTES
Daily Note     Today's date: 2022  Patient name: Jeffry Clay  : 1980  MRN: [id number]  Referring provider: Stuart Michelle  Dx:   Encounter Diagnosis     ICD-10-CM    1  Adhesive capsulitis of left shoulder  M75 02        Start Time: 915  Stop Time:   Total time in clinic (min): 60 minutes    Subjective: Pt offered no concerns  Objective: See treatment diary below      Assessment: Tolerated treatment well  AROM motion and strength continue to improve with end range fatigue/discomfort  Scapular strength gains noted during prone exercises  PROM is constantly improving with decreased overall end range pain; rotation, internally continues to be most painful  Patient demonstrated fatigue post treatment and would benefit from continued PT      Plan: Continue per plan of care  Progress treatment as tolerated         Precautions: s/p L RTC repair, NO AROM X 6 weeks       Re-eval Date:     Date     Visit Count 41 42 43 44    FOTO        Pain In 0/10       Pain Out 3/10             Manuals     PROM L shoulder   GENTLE , inferior glides L shoulder to tolerance, posterior glides L shoulder to tolerance    Supine  10' total PROM L shoulder, LAD L GHJ   End range stretch all planes to tolerance  Supine  10' total PROM L shoulder, LAD L GHJ   End range stretch all planes to tolerance                             Neuro Re-Ed        Ball on wall 4way red ball cueing for scap retraction  10x ea 4way red ball cueing for scap retraction  10x ea 4way red ball cueing for scap retraction  10x ea 4way red ball cueing for scap retraction  10x ea    Wall push up plus 3x10/3-5" cues for retraction 3x10/3-5" cues for retraction 3x10/3-5" cues for retraction 3x10/3-5" cues for retraction                                            Ther Ex        Ube warm up  Alt 90rpm  80 rpm alt 10'  80 rpm alt 10'  80 rpm alt 10'  60 RPM  10'    Posterior capsule stretch      Self L shoulder caudal glide        Pulleys flexion/scaption        Finger ladder flexion/scaption        Wall slides vs table slides flexion/abduction      Supine AAROM shoulder flexion MTP/LTP  Blue 30 x ea /3-5"    IR black  3x10/3-5"    ER black  3x10/3-5      Standing AROM  2# 2x10/3-5' ea MTP/LTP  Blue 30 x ea /3-5"    IR black  3x10/3-5"    ER black  3x10/3-5      Standing AROM  2# 2x10/3-5' ea PNF  Blue  2x10/3-5"                  Standing AROM  2# 2x10/3-5' ea PNF  Blue  2x10/3-5"                  Standing AROM  2# 2x10/3-5' ea    Sleeper stretch      Supine 90/90 self ER stretch  Prone scap/hori abd/ext  3# 3x10/3-5"ea  Prone scap/hori abd/ext  3# 3x10/3-5"ea Prone scap/hori abd/ext  3# 3x10/3-5"ea     Prone row  3# 3x10/3-5"  Prone row  3# 3x10/3-5" Prone row  3# 3x10/3-5"     sidelying ER  3# 3x10    sidelying Flex  3# 3x10    sidelying ABD  3# 3x10  sidelying ER  3# 3x10    sidelying Flex  3# 3x10    sidelying ABD  3# 3x10 sidelying ER  3# 3x10    sidelying Flex  3# 3x10    sidelying ABD  3# 3x10    Ther Activity                        Gait Training                        Modalities CP 10' no adverse effects  CP 10' no adverse effects

## 2022-07-06 ENCOUNTER — OFFICE VISIT (OUTPATIENT)
Dept: PHYSICAL THERAPY | Facility: CLINIC | Age: 42
End: 2022-07-06
Payer: OTHER MISCELLANEOUS

## 2022-07-06 DIAGNOSIS — M75.02 ADHESIVE CAPSULITIS OF LEFT SHOULDER: Primary | ICD-10-CM

## 2022-07-06 PROCEDURE — 97112 NEUROMUSCULAR REEDUCATION: CPT

## 2022-07-06 PROCEDURE — 97110 THERAPEUTIC EXERCISES: CPT

## 2022-07-06 PROCEDURE — 97140 MANUAL THERAPY 1/> REGIONS: CPT

## 2022-07-06 NOTE — PROGRESS NOTES
Daily Note     Today's date: 2022  Patient name: Mimi Zaragoza  : 1980  MRN: [id number]  Referring provider: Sachin Hinson  Dx:   Encounter Diagnosis     ICD-10-CM    1  Adhesive capsulitis of left shoulder  M75 02        Start Time: 1084  Stop Time: 1055  Total time in clinic (min): 60 minutes    Subjective: "I feel my shoulder is getting better  I am getting more motion but the strength isn't where I would like it "      Objective: See treatment diary below      Assessment: Tolerated treatment well  Pt continues to improve with motion and strength  Hand N/T noted with pec stretch today  PROM nearly normal with minimal end range pain  Strength slowly improving with OH movements  R shoulder 'clicking' noted; only performed single arm raises  Will continue to progress as able to address deficits  Patient demonstrated fatigue post treatment and would benefit from continued PT      Plan: Continue per plan of care  Progress treatment as tolerated         Precautions: s/p L RTC repair, NO AROM X 6 weeks       Re-eval Date:     Date    Visit Count 41 42 43 44 45   FOTO        Pain In 0/10       Pain Out 3/10             Manuals    PROM L shoulder   GENTLE , inferior glides L shoulder to tolerance, posterior glides L shoulder to tolerance    Supine  10' total PROM L shoulder, LAD L GHJ   End range stretch all planes to tolerance  Supine  10' total PROM L shoulder, LAD L GHJ   End range stretch all planes to tolerance  Supine  10' total PROM L shoulder, LAD L GHJ   End range stretch all planes to tolerance                           Neuro Re-Ed        Ball on wall 4way red ball cueing for scap retraction  10x ea 4way red ball cueing for scap retraction  10x ea 4way red ball cueing for scap retraction  10x ea 4way red ball cueing for scap retraction  10x ea 4way red ball cueing for scap retraction  10x ea   Wall push up plus 3x10/3-5" cues for retraction 3x10/3-5" cues for retraction 3x10/3-5" cues for retraction 3x10/3-5" cues for retraction 3x10/3-5" cues for retraction                                           Ther Ex        Ube warm up  Alt 90rpm  80 rpm alt 10'  80 rpm alt 10'  80 rpm alt 10'  60 RPM  10' 60 RPM  10'   Posterior capsule stretch      Self L shoulder caudal glide        Pulleys flexion/scaption        Finger ladder flexion/scaption        Wall slides vs table slides flexion/abduction      Supine AAROM shoulder flexion MTP/LTP  Blue 30 x ea /3-5"    IR black  3x10/3-5"    ER black  3x10/3-5      Standing AROM  2# 2x10/3-5' ea MTP/LTP  Blue 30 x ea /3-5"    IR black  3x10/3-5"    ER black  3x10/3-5      Standing AROM  2# 2x10/3-5' ea PNF  Blue  2x10/3-5"                  Standing AROM  2# 2x10/3-5' ea PNF  Blue  2x10/3-5"                  Standing AROM  2# 2x10/3-5' ea PNF  Blue  2x10/3-5"                  Standing AROM  2# 2x10/3-5' ea   Sleeper stretch      Supine 90/90 self ER stretch  Prone scap/hori abd/ext  3# 3x10/3-5"ea  Prone scap/hori abd/ext  3# 3x10/3-5"ea Prone scap/hori abd/ext  3# 3x10/3-5"ea Prone scap/hori abd/ext  3# 3x10/3-5"ea    Prone row  3# 3x10/3-5"  Prone row  3# 3x10/3-5" Prone row  3# 3x10/3-5" Prone row  3# 3x10/3-5"    sidelying ER  3# 3x10    sidelying Flex  3# 3x10    sidelying ABD  3# 3x10  sidelying ER  3# 3x10    sidelying Flex  3# 3x10    sidelying ABD  3# 3x10 sidelying ER  3# 3x10    sidelying Flex  3# 3x10    sidelying ABD  3# 3x10 sidelying ER  3# 3x10    sidelying Flex  3# 3x10    sidelying ABD  3# 3x10   Ther Activity                        Gait Training                        Modalities CP 10' no adverse effects  CP 10' no adverse effects  CP 10' no adverse effects

## 2022-07-07 ENCOUNTER — OFFICE VISIT (OUTPATIENT)
Dept: PHYSICAL THERAPY | Facility: CLINIC | Age: 42
End: 2022-07-07
Payer: OTHER MISCELLANEOUS

## 2022-07-07 DIAGNOSIS — M75.02 ADHESIVE CAPSULITIS OF LEFT SHOULDER: Primary | ICD-10-CM

## 2022-07-07 PROCEDURE — 97112 NEUROMUSCULAR REEDUCATION: CPT

## 2022-07-07 PROCEDURE — 97140 MANUAL THERAPY 1/> REGIONS: CPT

## 2022-07-07 PROCEDURE — 97110 THERAPEUTIC EXERCISES: CPT

## 2022-07-07 NOTE — PROGRESS NOTES
Daily Note     Today's date: 2022  Patient name: Mimi Zaragoza  : 1980  MRN: [id number]  Referring provider: Sachin Hinson  Dx:   Encounter Diagnosis     ICD-10-CM    1  Adhesive capsulitis of left shoulder  M75 02        Start Time: 830  Stop Time:   Total time in clinic (min): 55 minutes    Subjective: "My shoulder is sore from 2 days in a row "      Objective: See treatment diary below      Assessment: Tolerated treatment well  Progressed to push ups off plinth with difficulty  Increased joint burning noted after usual program   Increased pain in joint during PROM without loss of motion posisbly from having therapy yesterday  Will monitor symptoms and progress as able  Patient demonstrated fatigue post treatment and would benefit from continued PT      Plan: Continue per plan of care  Progress treatment as tolerated         Precautions: s/p L RTC repair, NO AROM X 6 weeks       Re-eval Date:     Date        Visit Count 46       FOTO        Pain In 2-3       Pain Out 3-4             Manuals        PROM L shoulder   GENTLE , inferior glides L shoulder to tolerance, posterior glides L shoulder to tolerance    Supine  10' total PROM L shoulder, LAD L GHJ   End range stretch all planes to tolerance                               Neuro Re-Ed        Ball on wall 4way red ball cueing for scap retraction  10x ea       Wall push up plus off plinth 3x10/3-5" cues for retraction                                               Ther Ex        Ube warm up  Alt 90rpm  70 rpm alt 10'        Posterior capsule stretch      Self L shoulder caudal glide        Pulleys flexion/scaption        Finger ladder flexion/scaption        Wall slides vs table slides flexion/abduction      Supine AAROM shoulder flexion PNF  Blue  2x10/3-5"      Standing AROM  2# 3x10/3-5' ea                               Sleeper stretch      Supine 90/90 self ER stretch  Prone scap/hori abd/ext  3# 3x10/3-5"ea        Prone row  3# 3x10/3-5"        sidelying ER  3# 3x10    sidelying Flex  3# 3x10    sidelying ABD  3# 3x10       Ther Activity                        Gait Training                        Modalities CP 10' no adverse effects

## 2022-07-12 ENCOUNTER — OFFICE VISIT (OUTPATIENT)
Dept: PHYSICAL THERAPY | Facility: CLINIC | Age: 42
End: 2022-07-12
Payer: OTHER MISCELLANEOUS

## 2022-07-12 DIAGNOSIS — M75.02 ADHESIVE CAPSULITIS OF LEFT SHOULDER: Primary | ICD-10-CM

## 2022-07-12 PROCEDURE — 97140 MANUAL THERAPY 1/> REGIONS: CPT

## 2022-07-12 PROCEDURE — 97112 NEUROMUSCULAR REEDUCATION: CPT

## 2022-07-12 PROCEDURE — 97110 THERAPEUTIC EXERCISES: CPT

## 2022-07-12 NOTE — PROGRESS NOTES
Daily Note     Today's date: 2022  Patient name: Raymond Thomas  : 1980  MRN: [id number]  Referring provider: Leopold Barker  Dx:   Encounter Diagnosis     ICD-10-CM    1  Adhesive capsulitis of left shoulder  M75 02                   Subjective: "My L shoulder is normal soreness but my R shoulder is worse  I think I over did it last time doing the incline push ups "       Objective: See treatment diary below      Assessment: Tolerated treatment well  Pt continues to be able to maintain motion  Strength continues to improve with end range discomfort over head  Declined push ups this appt 2* increased R shoulder pain  Pt has been completing AROM with L UE for a couple of weeks 2* progressing increased pain in R  Will continue to progress as able to address deficits  Patient demonstrated fatigue post treatment and would benefit from continued PT      Plan: Continue per plan of care  Progress treatment as tolerated         Precautions: s/p L RTC repair, NO AROM X 6 weeks       Re-eval Date:     Date       Visit Count 46 47      FOTO        Pain In 2-3 2-310      Pain Out 3-4 2-3/10            Manuals       PROM L shoulder   GENTLE , inferior glides L shoulder to tolerance, posterior glides L shoulder to tolerance    Supine  10' total PROM L shoulder, LAD L GHJ   End range stretch all planes to tolerance Supine  10' total PROM L shoulder, LAD L GHJ   End range stretch all planes to tolerance                              Neuro Re-Ed        Ball on wall 4way red ball cueing for scap retraction  10x ea 4way red ball cueing for scap retraction  10x ea      Wall push up plus off plinth 3x10/3-5" cues for retraction NP increased R shoulder pain                                              Ther Ex        Ube warm up  Alt 90rpm  70 rpm alt 10'  70 rpm alt 10'       Posterior capsule stretch      Self L shoulder caudal glide        Pulleys flexion/scaption        Finger ladder flexion/scaption        Wall slides vs table slides flexion/abduction      Supine AAROM shoulder flexion PNF  Blue  2x10/3-5"      Standing AROM  2# 3x10/3-5' ea PNF  Blue  2x10/3-5"      Standing AROM  2# 3x10/3-5' ea                            Sleeper stretch      Supine 90/90 self ER stretch  Prone scap/hori abd/ext  3# 3x10/3-5"ea Prone scap/hori abd/ext  3# 3x10/3-5"ea       Prone row  3# 3x10/3-5" Prone row  3# 3x10/3-5"       sidelying ER  3# 3x10    sidelying Flex  3# 3x10    sidelying ABD  3# 3x10 sidelying ER  3# 3x10    sidelying Flex  3# 3x10    sidelying ABD  3# 3x10      Ther Activity                        Gait Training                        Modalities CP 10' no adverse effects CP 10' no adverse effects

## 2022-07-14 ENCOUNTER — OFFICE VISIT (OUTPATIENT)
Dept: PHYSICAL THERAPY | Facility: CLINIC | Age: 42
End: 2022-07-14
Payer: OTHER MISCELLANEOUS

## 2022-07-14 DIAGNOSIS — M75.02 ADHESIVE CAPSULITIS OF LEFT SHOULDER: Primary | ICD-10-CM

## 2022-07-14 PROCEDURE — 97140 MANUAL THERAPY 1/> REGIONS: CPT

## 2022-07-14 PROCEDURE — 97110 THERAPEUTIC EXERCISES: CPT

## 2022-07-14 PROCEDURE — 97112 NEUROMUSCULAR REEDUCATION: CPT

## 2022-07-14 NOTE — PROGRESS NOTES
Daily Note     Today's date: 2022  Patient name: David Persaud  : 1980  MRN: [id number]  Referring provider: Ladi Francisco  Dx:   Encounter Diagnosis     ICD-10-CM    1  Adhesive capsulitis of left shoulder  M75 02        Start Time: 830  Stop Time: 3000  Total time in clinic (min): 55 minutes    Subjective: "My shoulder is feeling better today  It did crack the other day when I was laying in bed with arm up near the back of my head  It didn't hurt anymore than usual "      Objective: See treatment diary below      Assessment: Tolerated treatment well  Able to complete program without incident  Motion and strength are beginning to plateau  Will continue to progress as able and attempt to transition to HEP  Patient demonstrated fatigue post treatment and would benefit from continued PT      Plan: Continue per plan of care  Progress treatment as tolerated         Precautions: s/p L RTC repair, NO AROM X 6 weeks       Re-eval Date:     Date      Visit Count 46 47 48     FOTO        Pain In 2-3 2-3/10 2-3     Pain Out 3-4 2-3/10 2-3           Manuals      PROM L shoulder   GENTLE , inferior glides L shoulder to tolerance, posterior glides L shoulder to tolerance    Supine  10' total PROM L shoulder, LAD L GHJ   End range stretch all planes to tolerance Supine  10' total PROM L shoulder, LAD L GHJ   End range stretch all planes to tolerance Supine  10' total PROM L shoulder, LAD L GHJ   End range stretch all planes to tolerance                             Neuro Re-Ed        Ball on wall 4way red ball cueing for scap retraction  10x ea 4way red ball cueing for scap retraction  10x ea 4way red ball cueing for scap retraction  10x ea     Wall push up plus off plinth 3x10/3-5" cues for retraction NP increased R shoulder pain                                              Ther Ex        Ube warm up  Alt 90rpm  70 rpm alt 10'  70 rpm alt 10'  70 rpm alt 10' Posterior capsule stretch      Self L shoulder caudal glide        Pulleys flexion/scaption        Finger ladder flexion/scaption        Wall slides vs table slides flexion/abduction      Supine AAROM shoulder flexion PNF  Blue  2x10/3-5"      Standing AROM  2# 3x10/3-5' ea PNF  Blue  2x10/3-5"      Standing AROM  2# 3x10/3-5' ea     PNF  Blue  2x10/3-5"      Standing AROM  2# 3x10/3-5' ea                       Sleeper stretch      Supine 90/90 self ER stretch  Prone scap/hori abd/ext  3# 3x10/3-5"ea Prone scap/hori abd/ext  3# 3x10/3-5"ea Prone scap/hori abd/ext  3# 3x10/3-5"ea      Prone row  3# 3x10/3-5" Prone row  3# 3x10/3-5" Prone row  3# 3x10/3-5"      sidelying ER  3# 3x10    sidelying Flex  3# 3x10    sidelying ABD  3# 3x10 sidelying ER  3# 3x10    sidelying Flex  3# 3x10    sidelying ABD  3# 3x10 sidelying ER  3# 3x10    sidelying Flex  3# 3x10    sidelying ABD  3# 3x10     Ther Activity                        Gait Training                        Modalities CP 10' no adverse effects CP 10' no adverse effects CP 10' no adverse effects

## 2022-07-19 ENCOUNTER — APPOINTMENT (OUTPATIENT)
Dept: PHYSICAL THERAPY | Facility: CLINIC | Age: 42
End: 2022-07-19
Payer: OTHER MISCELLANEOUS

## 2022-07-21 ENCOUNTER — EVALUATION (OUTPATIENT)
Dept: PHYSICAL THERAPY | Facility: CLINIC | Age: 42
End: 2022-07-21
Payer: OTHER MISCELLANEOUS

## 2022-07-21 DIAGNOSIS — M75.02 ADHESIVE CAPSULITIS OF LEFT SHOULDER: Primary | ICD-10-CM

## 2022-07-21 PROCEDURE — 97140 MANUAL THERAPY 1/> REGIONS: CPT | Performed by: PHYSICAL MEDICINE & REHABILITATION

## 2022-07-21 PROCEDURE — 97110 THERAPEUTIC EXERCISES: CPT | Performed by: PHYSICAL MEDICINE & REHABILITATION

## 2022-07-21 NOTE — PROGRESS NOTES
PT Re-Evaluation     Today's date: 2022  Patient name: Richie Aguirre  : 1980   MRN: [id number]  Referring provider: Gigi Morelos  Dx:   Encounter Diagnosis     ICD-10-CM    1  Adhesive capsulitis of left shoulder  M75 02                   Assessment  Assessment details: Richie Aguirre is a 39 y o  male returning to outpatient physical therapy with noted impairments including cont'd L shoulder pain, impaired soft tissue mobility, reduced range of motion, reduced strength, reduced postural awareness, and reduced activity tolerance  Signs and symptoms at present are consistent with referring diagnosis of L should adhesive capsulitis s/p RTC repair 2021  Pt has remained compliant with PT and HEP  He has cont'd to make gradual progress with his L shoulder/GHJ mobility with improvements in active and passive ROM since last RE  Most notable improvements have been made in L shoulder flexion active and passive ROM vs prior sessions  He also notes improvements in overall pain levels and activity tolerance from last RE, with improving overhead motion and activity tolerance  Improving L GHJ strength noted all planes with PREs  He demonstrates cont'd deficits in end ROM L shoulder overhead with cont'd pain and mm weakness L shoulder and RTC with overhead tasks  He may benefit from cont'd skilled OPPT in order to improve strength and stability of L GHJ (especially overhead) and restore max ROM of L GHJ in order to allow pt full return to PLOF with reduced limitations  However pt has been attending PT for an extended period of time and is I with program; he may be appropriate for d/c to HEP pending f/u with MD  RTD Monday  Impairments: abnormal or restricted ROM, activity intolerance and pain with function    Symptom irritability: lowUnderstanding of Dx/Px/POC: good   Prognosis: fair    Goals  STGs to be achieved in 2-4 weeks:  1   Pt to demonstrate reduced subjective pain rating "at worst" by at least 2-3 points from Initial Eval in order to allow for reduced pain with ADLs and improved functional activity tolerance  - met   2  Pt to demonstrate grossly WFL PROM L shoulder without increase in pain/sx  - met for ROM, cont with end range discomfort all planes    3  Pt will increase L shoulder AROM elevation to at least 150* without increased pain or compensatory strategies  Met pROM, progressing AROM - MET increase AROM elevation to at least 160-165* in 2-4 weeks to improve functional use of L UE with ADLs - met       LTGs to be achieved in 9-12 weeks:  1  Pt will be I with HEP in order to continue to improve quality of life and independence and reduce risk for re-injury  - met   2  Pt to demonstrate return to ADLs and work without limitations or restrictions  - partially met, limited above 90* - continue   3  Pt to demonstrate improved function as noted by achieving or exceeding predicted score on FOTO outcomes assessment tool  - dc     Plan  Plan details: Cont PT vs d/c to HEP pending MD f/u Monday   Patient would benefit from: skilled physical therapy  Planned modality interventions: cryotherapy and thermotherapy: hydrocollator packs  Planned therapy interventions: joint mobilization, manual therapy, motor coordination training, neuromuscular re-education, patient education, stretching, therapeutic activities, therapeutic exercise, functional ROM exercises and home exercise program  Frequency: 2x week  Duration in visits: 12  Duration in weeks: 6  Plan of Care beginning date: 7/21/2022  Plan of Care expiration date: 9/1/2022  Treatment plan discussed with: patient        Subjective Evaluation    History of Present Illness  Mechanism of injury: surgery  Mechanism of injury: Pt reports overall improvement from last RE  Reports overall about 75% improvement in sx/function  Most notable improvements reported with ROM, pain, and strength from last RE per pt  Notes the pain is no longer "as constantly nagging"   Improving ROM overhead per pt  Reports improving strength L shoulder with ADls and PREs in PT  He reports he "doesn't do much overhead" activity as he is tall to begin with, however notes he still has pain with end range over head motions or stretching  No longer has clicking/catching in L shoulder  Notes his R shoulder has been bothering him  No new sx/complaints  RTD Monday  Quality of life: good    Pain  Current pain ratin  At best pain ratin (at rest)  At worst pain rating: 3 (Brief periods of 6-8/10 that come/go, lasts briefly )  Location: L shoulder  Quality: dull ache, tight, burning and sharp  Relieving factors: rest, support and ice  Progression: improved    Social Support  Steps to enter house: yes  2  Stairs in house: no   Lives in: [place]  Lives with: spouse    Employment status: not working ([employer])  Hand dominance: right    Treatments  Previous treatment: physical therapy, injection treatment and medication  Current treatment: medication and physical therapy  Current treatment comments: s/p RTC repair  Patient Goals  Patient goals for therapy: decreased pain, increased motion, increased strength, independence with ADLs/IADLs, return to sport/leisure activities and return to work          Objective     Postural Observations  Seated posture: fair  Standing posture: fair    Additional Postural Observation Details  Forward head/rounded shoulders  Increased thoracic kyphosis   B scapular depression and protraction with mild winging     L scapular depression and winging mildly more noticeable L vs R - improving/more symmetrical           Observations   Left Shoulder   Positive for incision  Negative for edema       Additional Observation Details    "Cyst" noted L superior medial shoulder/UT region; pt notes this is chronic   Incisions healed/closed L shoulder         Cervical/Thoracic Screen   Cervical range of motion within normal limits with the following exceptions: Grossly WFL without pain/sx        Neurological Testing     Sensation     Shoulder   Left Shoulder   Intact: light touch    Right Shoulder   Intact: light touch    Additional Neurological Details  Sensation intact to light touch     Active Range of Motion   Left Shoulder   Flexion: 150 degrees with pain  Abduction: 155 degrees with pain  External rotation 0°: WFL and with pain  External rotation BTH: T3 with pain  Internal rotation 0°: Left shoulder active internal rotation at 0 degrees: to body  Internal rotation BTB: T9 with pain    Additional Active Range of Motion Details  Pain and apprehension approaching end ROM > 90* elevation - continues , less painful, more stiff per pt, most pain with forward flexion and IR/ER at end ranges    Burning pain L GHJ if he "moves it too much"; just sore now per pt; more pain with ROM > 90* approaching end ranges , especially flexion   Pain local to L GHJ/anterior shoulder  Mild scapular /shoulder elevation with end ROM ;will cont to monitor ; especially noted with abduction - resolved     Passive Range of Motion   Left Shoulder   Flexion: 170 degrees with pain  Abduction: 180 degrees with pain  External rotation 90°: 75 degrees with pain  Internal rotation 90°: 70 degrees with pain    Additional Passive Range of Motion Details  Continues with stiffness at end ranges with creep  Continues to report pain with end range motions all planes        L elbow AROM/PROM WNL         Joint Play   Left Shoulder  Hypomobile in the posterior capsule and inferior capsule      Additional Joint Play Details  Gross hypomobility L GHJ as noted above - limited by stiffness and mm guarding       Strength/Myotome Testing     Left Shoulder     Planes of Motion   Flexion: 4 (pain  4-4+)   Abduction: 4 (4-4+)   External rotation at 0°: 4+   Internal rotation at 0°: 4+     Isolated Muscles   Biceps: 4+   Upper trapezius: 4+     Additional Strength Details  MMT assessed with break test with arm at side IR/ER and mid range elevation to pt tolerance  Pain with MMT flexion > abduction, most painful with resisted flexion   No pain with IR/ER   Continues to generally report flexion of L shoulder is more painful than abduction       L wrist grossly 4+/5  L hand grossly 4+/5    L elbow 4+-5/5 flexion without pain/sx; extension 4+/5 with shoulder pain                  Precautions: s/p L RTC repair, NO AROM X 6 weeks       Re-eval Date: 7/21    Date 7/7 7/12 7/14 7/21    Visit Count 46 47 48 49    FOTO        Pain In 2-3 2-3/10 2-3 1-2/10    Pain Out 3-4 2-3/10 2-3 2/10          Manuals 7/7 7/12 7/14 7/21    PROM L shoulder   GENTLE , inferior glides L shoulder to tolerance, posterior glides L shoulder to tolerance    Supine  10' total PROM L shoulder, LAD L GHJ   End range stretch all planes to tolerance Supine  10' total PROM L shoulder, LAD L GHJ   End range stretch all planes to tolerance Supine  10' total PROM L shoulder, LAD L GHJ   End range stretch all planes to tolerance Supine  15' total PROM L shoulder, LAD L GHJ   End range stretch all planes to tolerance                            Neuro Re-Ed        Ball on wall 4way red ball cueing for scap retraction  10x ea 4way red ball cueing for scap retraction  10x ea 4way red ball cueing for scap retraction  10x ea 4way red ball cueing for scap retraction  10x ea    Wall push up plus off plinth 3x10/3-5" cues for retraction NP increased R shoulder pain                                              Ther Ex        Ube warm up  Alt 90rpm  70 rpm alt 10'  70 rpm alt 10'  70 rpm alt 10'  70 rpm alt 10'     Posterior capsule stretch      Self L shoulder caudal glide        Pulleys flexion/scaption        Finger ladder flexion/scaption        Wall slides vs table slides flexion/abduction      Supine AAROM shoulder flexion PNF  Blue  2x10/3-5"      Standing AROM  2# 3x10/3-5' ea PNF  Blue  2x10/3-5"      Standing AROM  2# 3x10/3-5' ea     PNF  Blue  2x10/3-5"      Standing AROM  2# 3x10/3-5' ea PNF flex/ext  Blue  2x10/3-5"ea      Standing AROM  2# 3x10/3-5' ea                      Sleeper stretch      Supine 90/90 self ER stretch  Prone scap/hori abd/ext  3# 3x10/3-5"ea Prone scap/hori abd/ext  3# 3x10/3-5"ea Prone scap/hori abd/ext  3# 3x10/3-5"ea Prone scap/hori abd/ext  3# 3x10/3-5"ea     Prone row  3# 3x10/3-5" Prone row  3# 3x10/3-5" Prone row  3# 3x10/3-5" Prone row  3# 3x10/3-5"     sidelying ER  3# 3x10    sidelying Flex  3# 3x10    sidelying ABD  3# 3x10 sidelying ER  3# 3x10    sidelying Flex  3# 3x10    sidelying ABD  3# 3x10 sidelying ER  3# 3x10    sidelying Flex  3# 3x10    sidelying ABD  3# 3x10 sidelying ER  3# 3x10    sidelying Flex  3# 3x10    sidelying ABD  3# 3x10    Ther Activity                        Gait Training                        Modalities CP 10' no adverse effects CP 10' no adverse effects CP 10' no adverse effects CP 10' no adverse effects

## 2022-07-25 ENCOUNTER — OFFICE VISIT (OUTPATIENT)
Dept: OBGYN CLINIC | Facility: OTHER | Age: 42
End: 2022-07-25
Payer: OTHER MISCELLANEOUS

## 2022-07-25 VITALS
BODY MASS INDEX: 36.21 KG/M2 | SYSTOLIC BLOOD PRESSURE: 145 MMHG | HEIGHT: 78 IN | WEIGHT: 313 LBS | DIASTOLIC BLOOD PRESSURE: 100 MMHG | HEART RATE: 96 BPM

## 2022-07-25 DIAGNOSIS — M75.02 ADHESIVE CAPSULITIS OF LEFT SHOULDER: Primary | ICD-10-CM

## 2022-07-25 PROCEDURE — 99213 OFFICE O/P EST LOW 20 MIN: CPT | Performed by: PHYSICIAN ASSISTANT

## 2022-07-25 NOTE — LETTER
July 25, 2022     Patient: Kristin Ann  YOB: 1980  Date of Visit: 7/25/2022      To Whom it May Concern:    [address] is under my professional care  Brian Wright was seen in my office on 7/25/2022  Brian Wright may return to work on 7/26/2022 on light duty in uniform acceptable  If you have any questions or concerns, please don't hesitate to call           Sincerely,          Ashley Burns PA-C        CC: No Recipients

## 2022-07-25 NOTE — PROGRESS NOTES
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, so note will be provided  He has not progressed to work conditioning yet, so order was provided for PT  I will see him back in 6 weeks    1  Continue with PT - progress to work conditioning  2  HEP - per therapist  3  Slowly increase all activities to tolerance  4  Tylenol PRN  5  Note for work provided - light duty in uniform  Depending on progress with PT, hope to release at next visit  6  Follow up in 6 weeks    Subjective:   Patient ID: Catarina Crane is a 39 y o  male      Sanna Ridley presents to the office in follow up of the left shoulder  He is 14 5 months from rotator cuff repair by Dr Kehinde Zeng  Sanna Ridley developed adhesive capsulitis post-operatively  He continues to work with physical therapy for motion and strength  He denies new injury or trauma  He has been working on light duty without any issues  He feels the shoulder has progressed significantly  Denies pain at rest   Has pain at end ranges of motion and with sudden reaching or lifting  He denies pain that interferes with sleep  Admits to a painful pop about 2 weeks ago but notes increase in function after this episode  Sanna Ridley is a       The following portions of the patient's history were reviewed and updated as appropriate: allergies, current medications, past family history, past medical history, past social history, past surgical history and problem list     Review of Systems   Constitutional: Negative for chills and fever  HENT: Negative for hearing loss  Eyes: Negative for visual disturbance  Respiratory: Negative for shortness of breath  Cardiovascular: Negative for chest pain  Gastrointestinal: Negative for abdominal pain     Musculoskeletal:        As reviewed in the HPI   Skin: Negative for rash  Neurological:        As reviewed in the HPI   Psychiatric/Behavioral: Negative for agitation  Objective:  /100 (BP Location: Right arm, Patient Position: Sitting, Cuff Size: Adult) Comment: patient states he has white coat syndrom  Pulse 96   Ht 6' 6" (1 981 m)   Wt (!) 142 kg (313 lb)   BMI 36 17 kg/m²       Left Shoulder Exam     Tenderness   The patient is experiencing no tenderness  Range of Motion   Passive abduction: normal   External rotation: 70   Forward flexion: 170   Internal rotation 0 degrees: normal     Muscle Strength   External rotation: 5/5   Supraspinatus: 4/5   Subscapularis: 5/5     Tests   Apprehension: negative  Hernandez test: negative  Impingement: positive    Other   Erythema: absent  Sensation: normal  Pulse: present     Comments:  Pain with abduction and ER - limited by 15 degrees            Physical Exam  Constitutional:       Appearance: He is well-developed  HENT:      Head: Normocephalic  Pulmonary:      Effort: No respiratory distress  Breath sounds: No wheezing  Musculoskeletal:      Cervical back: Normal range of motion  Skin:     General: Skin is warm and dry  Neurological:      Mental Status: He is alert and oriented to person, place, and time  Psychiatric:         Behavior: Behavior normal          Thought Content:  Thought content normal          Judgment: Judgment normal

## 2022-08-02 ENCOUNTER — OFFICE VISIT (OUTPATIENT)
Dept: PHYSICAL THERAPY | Facility: CLINIC | Age: 42
End: 2022-08-02
Payer: OTHER MISCELLANEOUS

## 2022-08-02 DIAGNOSIS — M75.02 ADHESIVE CAPSULITIS OF LEFT SHOULDER: Primary | ICD-10-CM

## 2022-08-02 PROCEDURE — 97110 THERAPEUTIC EXERCISES: CPT | Performed by: PHYSICAL MEDICINE & REHABILITATION

## 2022-08-02 PROCEDURE — 97140 MANUAL THERAPY 1/> REGIONS: CPT | Performed by: PHYSICAL MEDICINE & REHABILITATION

## 2022-08-02 NOTE — PROGRESS NOTES
Daily Note     Today's date: 2022  Patient name: Pauly Magdaleno  : 1980  MRN: [id number]  Referring provider: Carmen Laguerre  Dx:   Encounter Diagnosis     ICD-10-CM    1  Adhesive capsulitis of left shoulder  M75 02                   Subjective: Pt notes no new sx/complaints  Notes f/u with MD went well; was hoping to be released to full duty however was not allowed this at this time due to fears of re-injury or regression of progress; notes he RTD in 6 weeks  Overall pt feels he is making cont'd slow improvements in his shoulder ROM and strength  Objective: See treatment diary below      Assessment: Tolerated treatment well  Progressing well with L shoulder strength as evidenced by ability to increase reps and or resistance levels with PREs without increase pain/sx  Pt continues to maintain gains with L shoulder PROM; L shoulder PROM approaching normal limits at all end ranges with min end range tightness only  No complaints after t x  Cont with endurance and strength deficits with overhead activity leading to cont'd pain/sx and limitations with ADls and work activity  Patient demonstrated fatigue post treatment and would benefit from continued PT      Plan: Continue per plan of care  Progress treatment as tolerated           Precautions: s/p L RTC repair, NO AROM X 6 weeks       Re-eval Date:     Date  8/2   Visit Count 46 47 48 49 50   FOTO        Pain In 2-3 2-3/10 2-3 1-2/10 0/10   Pain Out 3-4 2-3/10 2-3 2/10 0/10         Manuals  8/2   PROM L shoulder   GENTLE , inferior glides L shoulder to tolerance, posterior glides L shoulder to tolerance    Supine  10' total PROM L shoulder, LAD L GHJ   End range stretch all planes to tolerance Supine  10' total PROM L shoulder, LAD L GHJ   End range stretch all planes to tolerance Supine  10' total PROM L shoulder, LAD L GHJ   End range stretch all planes to tolerance Supine  15' total PROM L shoulder, LAD L GHJ   End range stretch all planes to tolerance Supine  10' total PROM L shoulder, LAD L GHJ   End range stretch all planes to tolerance                           Neuro Re-Ed        Ball on wall 4way red ball cueing for scap retraction  10x ea 4way red ball cueing for scap retraction  10x ea 4way red ball cueing for scap retraction  10x ea 4way red ball cueing for scap retraction  10x ea 4way red ball cueing for scap retraction  20x ea   Wall push up plus off plinth 3x10/3-5" cues for retraction NP increased R shoulder pain                                              Ther Ex        Ube warm up  Alt 90rpm  70 rpm alt 10'  70 rpm alt 10'  70 rpm alt 10'  70 rpm alt 10'  70 rpm alt 10'    Posterior capsule stretch      Self L shoulder caudal glide        Pulleys flexion/scaption        Finger ladder flexion/scaption        Wall slides vs table slides flexion/abduction      Supine AAROM shoulder flexion PNF  Blue  2x10/3-5"      Standing AROM  2# 3x10/3-5' ea PNF  Blue  2x10/3-5"      Standing AROM  2# 3x10/3-5' ea     PNF  Blue  2x10/3-5"      Standing AROM  2# 3x10/3-5' ea PNF flex/ext  Blue  2x10/3-5"ea      Standing AROM  2# 3x10/3-5' ea PNF flex/ext  Grey  2x10/3-5"ea      Standing AROM  3# 2x10/3-5' ea                   Sleeper stretch      Supine 90/90 self ER stretch  Prone scap/hori abd/ext  3# 3x10/3-5"ea Prone scap/hori abd/ext  3# 3x10/3-5"ea Prone scap/hori abd/ext  3# 3x10/3-5"ea Prone scap/hori abd/ext  3# 3x10/3-5"ea Prone scap/hori abd/ext  3# 3x10/3-5"ea    Prone row  3# 3x10/3-5" Prone row  3# 3x10/3-5" Prone row  3# 3x10/3-5" Prone row  3# 3x10/3-5" Prone row  3# 3x10/3-5"    sidelying ER  3# 3x10    sidelying Flex  3# 3x10    sidelying ABD  3# 3x10 sidelying ER  3# 3x10    sidelying Flex  3# 3x10    sidelying ABD  3# 3x10 sidelying ER  3# 3x10    sidelying Flex  3# 3x10    sidelying ABD  3# 3x10 sidelying ER  3# 3x10    sidelying Flex  3# 3x10    sidelying ABD  3# 3x10 sidelying ER  3# 3x10    sidelying Flex  3# 3x10    sidelying ABD  3# 3x10   Ther Activity                        Gait Training                        Modalities CP 10' no adverse effects CP 10' no adverse effects CP 10' no adverse effects CP 10' no adverse effects CP 10' no adverse effects

## 2022-08-04 ENCOUNTER — APPOINTMENT (OUTPATIENT)
Dept: PHYSICAL THERAPY | Facility: CLINIC | Age: 42
End: 2022-08-04
Payer: OTHER MISCELLANEOUS

## 2022-08-09 ENCOUNTER — OFFICE VISIT (OUTPATIENT)
Dept: PHYSICAL THERAPY | Facility: CLINIC | Age: 42
End: 2022-08-09
Payer: OTHER MISCELLANEOUS

## 2022-08-09 DIAGNOSIS — M75.02 ADHESIVE CAPSULITIS OF LEFT SHOULDER: Primary | ICD-10-CM

## 2022-08-09 PROCEDURE — 97140 MANUAL THERAPY 1/> REGIONS: CPT

## 2022-08-09 PROCEDURE — 97110 THERAPEUTIC EXERCISES: CPT

## 2022-08-09 PROCEDURE — 97112 NEUROMUSCULAR REEDUCATION: CPT

## 2022-08-09 NOTE — PROGRESS NOTES
Daily Note     Today's date: 2022  Patient name: Lilly Serrano  : 1980  MRN: [id number]  Referring provider: Jasmin Sanchez*  Dx:   Encounter Diagnosis     ICD-10-CM    1  Adhesive capsulitis of left shoulder  M75 02        Start Time: 830  Stop Time: 930  Total time in clinic (min): 60 minutes    Subjective: "I have been sick with a sinus infection for 2 weeks and I ache all over "      Objective: See treatment diary below      Assessment: Tolerated treatment well  Pt able to complete program without increase in symptoms, however completed at slower pace  Tightness with slight end range pain in all directions  Challenged with PNF into extension above 90*  Will continue to progress as able to return to PLOF  Patient demonstrated fatigue post treatment and would benefit from continued PT      Plan: Continue per plan of care  Progress treatment as tolerated           Precautions: s/p L RTC repair, NO AROM X 6 weeks       Re-eval Date:     Date        Visit Count 51       FOTO        Pain In 0/10       Pain Out              Manuals        PROM L shoulder   GENTLE , inferior glides L shoulder to tolerance, posterior glides L shoulder to tolerance    Supine  10' total PROM L shoulder, LAD L GHJ   End range stretch all planes to tolerance                               Neuro Re-Ed        Ball on wall 4way red ball cueing for scap retraction  20x ea       Wall push up plus                                                Ther Ex        Ube warm up  Alt 90rpm  70 rpm alt 10'        Posterior capsule stretch      Self L shoulder caudal glide        Pulleys flexion/scaption        Finger ladder flexion/scaption        Wall slides vs table slides flexion/abduction      Supine AAROM shoulder flexion PNF  grey  2x10/3-5"      Standing AROM  2# 3x10/3-5' ea       Sleeper stretch      Supine 90/90 self ER stretch  Prone scap/hori abd/ext  3# 3x10/3-5"ea        Prone row  3# 3x10/3-5" sidelying ER  3# 3x10    sidelying Flex  3# 3x10    sidelying ABD  3# 3x10       Ther Activity                        Gait Training                        Modalities CP 10' no adverse effects

## 2022-08-11 ENCOUNTER — OFFICE VISIT (OUTPATIENT)
Dept: PHYSICAL THERAPY | Facility: CLINIC | Age: 42
End: 2022-08-11
Payer: OTHER MISCELLANEOUS

## 2022-08-11 DIAGNOSIS — M75.02 ADHESIVE CAPSULITIS OF LEFT SHOULDER: Primary | ICD-10-CM

## 2022-08-11 PROCEDURE — 97110 THERAPEUTIC EXERCISES: CPT

## 2022-08-11 PROCEDURE — 97140 MANUAL THERAPY 1/> REGIONS: CPT

## 2022-08-11 PROCEDURE — 97112 NEUROMUSCULAR REEDUCATION: CPT

## 2022-08-11 NOTE — PROGRESS NOTES
Daily Note     Today's date: 2022  Patient name: Kae Thapa  : 1980  MRN: [id number]  Referring provider: Suad Kam  Dx:   Encounter Diagnosis     ICD-10-CM    1  Adhesive capsulitis of left shoulder  M75 02        Start Time: 830  Stop Time: 930  Total time in clinic (min): 60 minutes    Subjective: "I am feeling better than the other day  I have movement and less pain "      Objective: See treatment diary below      Assessment: Tolerated treatment well  Good motion with minimal to no end range pain in all PROM  Strength improving; above 90* continues to be challenged  Will continue to progress as able to address deficits  Patient demonstrated fatigue post treatment and would benefit from continued PT      Plan: Continue per plan of care  Progress treatment as tolerated           Precautions: s/p L RTC repair, NO AROM X 6 weeks       Re-eval Date:     Date       Visit Count 51 52      FOTO        Pain In 0/10 0      Pain Out  0            Manuals       PROM L shoulder   GENTLE , inferior glides L shoulder to tolerance, posterior glides L shoulder to tolerance    Supine  10' total PROM L shoulder, LAD L GHJ   End range stretch all planes to tolerance Supine  10' total PROM L shoulder, LAD L GHJ   End range stretch all planes to tolerance                              Neuro Re-Ed        Ball on wall 4way red ball cueing for scap retraction  20x ea 4way red ball cueing for scap retraction  20x ea      Wall push up plus                                                Ther Ex        Ube warm up  Alt 90rpm  70 rpm alt 10'  70 rpm alt 10'       Posterior capsule stretch      Self L shoulder caudal glide        Pulleys flexion/scaption        Finger ladder flexion/scaption        Wall slides vs table slides flexion/abduction      Supine AAROM shoulder flexion PNF  grey  2x10/3-5"      Standing AROM  2# 3x10/3-5' ea PNF  grey  2x10/3-5"      Standing AROM  2# 3x10/3-5' ea Sleeper stretch      Supine 90/90 self ER stretch  Prone scap/hori abd/ext  3# 3x10/3-5"ea Prone scap/hori abd/ext  3# 3x10/3-5"ea       Prone row  3# 3x10/3-5" Prone row  3# 3x10/3-5"       sidelying ER  3# 3x10    sidelying Flex  3# 3x10    sidelying ABD  3# 3x10 sidelying ER  3# 3x10    sidelying Flex  3# 3x10    sidelying ABD  3# 3x10      Ther Activity                        Gait Training                        Modalities CP 10' no adverse effects CP 10' no adverse effects

## 2022-08-16 ENCOUNTER — OFFICE VISIT (OUTPATIENT)
Dept: PHYSICAL THERAPY | Facility: CLINIC | Age: 42
End: 2022-08-16
Payer: OTHER MISCELLANEOUS

## 2022-08-16 DIAGNOSIS — M75.02 ADHESIVE CAPSULITIS OF LEFT SHOULDER: Primary | ICD-10-CM

## 2022-08-16 PROCEDURE — 97140 MANUAL THERAPY 1/> REGIONS: CPT | Performed by: PHYSICAL MEDICINE & REHABILITATION

## 2022-08-16 PROCEDURE — 97112 NEUROMUSCULAR REEDUCATION: CPT | Performed by: PHYSICAL MEDICINE & REHABILITATION

## 2022-08-16 PROCEDURE — 97110 THERAPEUTIC EXERCISES: CPT | Performed by: PHYSICAL MEDICINE & REHABILITATION

## 2022-08-16 NOTE — PROGRESS NOTES
Daily Note     Today's date: 2022  Patient name: Jerardo Bowen  : 1980  MRN: [id number]  Referring provider: Saranya Sandhu  Dx:   Encounter Diagnosis     ICD-10-CM    1  Adhesive capsulitis of left shoulder  M75 02                   Subjective: Pt notes overall his shoulder is slowly improving  Feels improvements in pain, ROM, and strength  Notes he is toleating more overhead activity/use of L UE  Notes his R shoulder still acts up from time to time  No new sx/complaints  Objective: See treatment diary below      Assessment: Tolerated treatment well  Progressing well with PREs; able to add weight to shoulder raises without incident  No increased pain and reduced mm fatigue noted  Continues to maintain improvements in PROM L shoulder all planes  Min end range tightness with 90/90 ER and shoulder flexion; cont with discomfort at end ranges; stiff end feels with creep  Cont with limitations with end range overhead shoulder motion AROM; tightness and weakness  Cont with shoulder flex and abduction weakness > 90*  Trialed CKC stabilization L shoulder with modified plank shoulder taps; noted L shoulder was "fine" but still has some discomfort R shoulder; will monitor  Patient demonstrated fatigue post treatment and would benefit from continued PT      Plan: Continue per plan of care  Progress treatment as tolerated           Precautions: s/p L RTC repair, NO AROM X 6 weeks       Re-eval Date:     Date      Visit Count 51 52 53     FOTO        Pain In 0/10 0 0/10     Pain Out  0 0/10           Manuals      PROM L shoulder   GENTLE , inferior glides L shoulder to tolerance, posterior glides L shoulder to tolerance    Supine  10' total PROM L shoulder, LAD L GHJ   End range stretch all planes to tolerance Supine  10' total PROM L shoulder, LAD L GHJ   End range stretch all planes to tolerance Supine  10' total PROM L shoulder, LAD L GHJ   End range stretch all planes to tolerance                             Neuro Re-Ed        Ball on wall 4way red ball cueing for scap retraction  20x ea 4way red ball cueing for scap retraction  20x ea 4way red ball cueing for scap retraction  20x ea     Wall push up plus           Plantigrade plank alt shoulder taps   10x ea /5" hold                                      Ther Ex        Ube warm up  Alt 90rpm  70 rpm alt 10'  70 rpm alt 10'  70 rpm alt 10'      Posterior capsule stretch      Self L shoulder caudal glide        Pulleys flexion/scaption        Finger ladder flexion/scaption        Wall slides vs table slides flexion/abduction      Supine AAROM shoulder flexion PNF  grey  2x10/3-5"      Standing AROM  2# 3x10/3-5' ea PNF  grey  2x10/3-5"      Standing AROM  2# 3x10/3-5' ea PNF  grey  2x10/3-5"      Standing AROM  3# 3x10/3-5' ea     Sleeper stretch      Supine 90/90 self ER stretch  Prone scap/hori abd/ext  3# 3x10/3-5"ea Prone scap/hori abd/ext  3# 3x10/3-5"ea Prone scap/hori abd/ext  3# 3x10/3-5"ea      Prone row  3# 3x10/3-5" Prone row  3# 3x10/3-5" Prone row  3# 3x10/3-5"      sidelying ER  3# 3x10    sidelying Flex  3# 3x10    sidelying ABD  3# 3x10 sidelying ER  3# 3x10    sidelying Flex  3# 3x10    sidelying ABD  3# 3x10 sidelying ER  3# 3x10    sidelying Flex  3# 3x10    sidelying ABD  3# 3x10     Ther Activity                        Gait Training                        Modalities CP 10' no adverse effects CP 10' no adverse effects Deferred to home today

## 2022-08-18 ENCOUNTER — OFFICE VISIT (OUTPATIENT)
Dept: PHYSICAL THERAPY | Facility: CLINIC | Age: 42
End: 2022-08-18
Payer: OTHER MISCELLANEOUS

## 2022-08-18 DIAGNOSIS — M75.02 ADHESIVE CAPSULITIS OF LEFT SHOULDER: Primary | ICD-10-CM

## 2022-08-18 PROCEDURE — 97140 MANUAL THERAPY 1/> REGIONS: CPT

## 2022-08-18 PROCEDURE — 97112 NEUROMUSCULAR REEDUCATION: CPT

## 2022-08-18 PROCEDURE — 97110 THERAPEUTIC EXERCISES: CPT

## 2022-08-18 NOTE — PROGRESS NOTES
Daily Note     Today's date: 2022  Patient name: Lydia Hobson  : 1980  MRN: [id number]  Referring provider: Hui Fernandez  Dx:   Encounter Diagnosis     ICD-10-CM    1  Adhesive capsulitis of left shoulder  M75 02        Start Time: 830  Stop Time: 920  Total time in clinic (min): 50 minutes    Subjective: "It's getting better "      Objective: See treatment diary below      Assessment: Tolerated treatment well  Able to increase weight on various exercises without change in pain or discomfort  Motion continues to increased above 90*  Strength gains noted as evident in ability to increased weight without issues  PROM nearly normal limits with minimal end range pain  Patient demonstrated fatigue post treatment and would benefit from continued PT      Plan: Continue per plan of care  Progress treatment as tolerated           Precautions: s/p L RTC repair, NO AROM X 6 weeks       Re-eval Date:     Date     Visit Count 51 52 53 54    FOTO        Pain In 0/10 0 0/10 0    Pain Out  0 0/10 0          Manuals     PROM L shoulder   GENTLE , inferior glides L shoulder to tolerance, posterior glides L shoulder to tolerance    Supine  10' total PROM L shoulder, LAD L GHJ   End range stretch all planes to tolerance Supine  10' total PROM L shoulder, LAD L GHJ   End range stretch all planes to tolerance Supine  10' total PROM L shoulder, LAD L GHJ   End range stretch all planes to tolerance Supine  10' total PROM L shoulder, LAD L GHJ   End range stretch all planes to tolerance                            Neuro Re-Ed        Ball on wall 4way red ball cueing for scap retraction  20x ea 4way red ball cueing for scap retraction  20x ea 4way red ball cueing for scap retraction  20x ea 4way red ball cueing for scap retraction  20x ea    Wall push up plus           Plantigrade plank alt shoulder taps   10x ea /5" hold  Plantigrade plank alt shoulder taps   10x ea /5" hold                                     Ther Ex        Ube warm up  Alt 90rpm  70 rpm alt 10'  70 rpm alt 10'  70 rpm alt 10'  70 rpm alt 10'     Posterior capsule stretch      Self L shoulder caudal glide        Pulleys flexion/scaption        Finger ladder flexion/scaption        Wall slides vs table slides flexion/abduction      Supine AAROM shoulder flexion PNF  grey  2x10/3-5"      Standing AROM  2# 3x10/3-5' ea PNF  grey  2x10/3-5"      Standing AROM  2# 3x10/3-5' ea PNF  grey  2x10/3-5"      Standing AROM  3# 3x10/3-5' ea PNF  grey  2x10/3-5"      Standing AROM  4# 3x10/3-5' ea    Sleeper stretch      Supine 90/90 self ER stretch  Prone scap/hori abd/ext  3# 3x10/3-5"ea Prone scap/hori abd/ext  3# 3x10/3-5"ea Prone scap/hori abd/ext  3# 3x10/3-5"ea Prone scap/hori abd/ext  4# 3x10/3-5"ea     Prone row  3# 3x10/3-5" Prone row  3# 3x10/3-5" Prone row  3# 3x10/3-5" Prone row  4# 3x10/3-5"     sidelying ER  3# 3x10    sidelying Flex  3# 3x10    sidelying ABD  3# 3x10 sidelying ER  3# 3x10    sidelying Flex  3# 3x10    sidelying ABD  3# 3x10 sidelying ER  3# 3x10    sidelying Flex  3# 3x10    sidelying ABD  3# 3x10 sidelying ER  4# 3x10    sidelying Flex  4# 3x10    sidelying ABD  4# 3x10    Ther Activity                        Gait Training                        Modalities CP 10' no adverse effects CP 10' no adverse effects Deferred to home today  deferred

## 2022-08-23 ENCOUNTER — OFFICE VISIT (OUTPATIENT)
Dept: PHYSICAL THERAPY | Facility: CLINIC | Age: 42
End: 2022-08-23
Payer: OTHER MISCELLANEOUS

## 2022-08-23 DIAGNOSIS — M75.02 ADHESIVE CAPSULITIS OF LEFT SHOULDER: Primary | ICD-10-CM

## 2022-08-23 PROCEDURE — 97112 NEUROMUSCULAR REEDUCATION: CPT

## 2022-08-23 PROCEDURE — 97140 MANUAL THERAPY 1/> REGIONS: CPT

## 2022-08-23 PROCEDURE — 97110 THERAPEUTIC EXERCISES: CPT

## 2022-08-23 NOTE — PROGRESS NOTES
Daily Note     Today's date: 2022  Patient name: Dom Bruno  : 1980  MRN: [id number]  Referring provider: Gerardo Pearson  Dx:   Encounter Diagnosis     ICD-10-CM    1  Adhesive capsulitis of left shoulder  M75 02        Start Time: 830  Stop Time: 920  Total time in clinic (min): 50 minutes    Subjective: "I think next week will be my last   I don't know what else can be done "      Objective: See treatment diary below      Assessment: Tolerated treatment well  Motion and strength continues to improve  Able complete motion >90* with greater ease  PROM nearing full range with minimal to no end range pain  Will continue to progress as able to return to PLOF  Patient demonstrated fatigue post treatment and would benefit from continued PT      Plan: Continue per plan of care  Progress treatment as tolerated           Precautions: s/p L RTC repair, NO AROM X 6 weeks       Re-eval Date:     Date    Visit Count 51 52 53 54 55   FOTO        Pain In 0/10 0 0/10 0 0   Pain Out  0 0/10 0          Manuals    PROM L shoulder   GENTLE , inferior glides L shoulder to tolerance, posterior glides L shoulder to tolerance    Supine  10' total PROM L shoulder, LAD L GHJ   End range stretch all planes to tolerance Supine  10' total PROM L shoulder, LAD L GHJ   End range stretch all planes to tolerance Supine  10' total PROM L shoulder, LAD L GHJ   End range stretch all planes to tolerance Supine  10' total PROM L shoulder, LAD L GHJ   End range stretch all planes to tolerance Supine  10' total PROM L shoulder, LAD L GHJ   End range stretch all planes to tolerance                           Neuro Re-Ed        Ball on wall 4way red ball cueing for scap retraction  20x ea 4way red ball cueing for scap retraction  20x ea 4way red ball cueing for scap retraction  20x ea 4way red ball cueing for scap retraction  20x ea 4way red ball cueing for scap retraction  20x ea   Wall push up plus           Plantigrade plank alt shoulder taps   10x ea /5" hold  Plantigrade plank alt shoulder taps   10x ea /5" hold  Plantigrade plank alt shoulder taps   10x ea /5" hold                                    Ther Ex        Ube warm up  Alt 90rpm  70 rpm alt 10'  70 rpm alt 10'  70 rpm alt 10'  70 rpm alt 10'  70 rpm alt 10'    Posterior capsule stretch      Self L shoulder caudal glide        Pulleys flexion/scaption        Finger ladder flexion/scaption        Wall slides vs table slides flexion/abduction      Supine AAROM shoulder flexion PNF  grey  2x10/3-5"      Standing AROM  2# 3x10/3-5' ea PNF  grey  2x10/3-5"      Standing AROM  2# 3x10/3-5' ea PNF  grey  2x10/3-5"      Standing AROM  3# 3x10/3-5' ea PNF  grey  2x10/3-5"      Standing AROM  4# 3x10/3-5' ea PNF  grey  2x10/3-5"      Standing AROM  4# 3x10/3-5' ea   Sleeper stretch      Supine 90/90 self ER stretch  Prone scap/hori abd/ext  3# 3x10/3-5"ea Prone scap/hori abd/ext  3# 3x10/3-5"ea Prone scap/hori abd/ext  3# 3x10/3-5"ea Prone scap/hori abd/ext  4# 3x10/3-5"ea Prone scap/hori abd/ext  4# 3x10/3-5"ea    Prone row  3# 3x10/3-5" Prone row  3# 3x10/3-5" Prone row  3# 3x10/3-5" Prone row  4# 3x10/3-5" Prone row  4# 3x10/3-5"    sidelying ER  3# 3x10    sidelying Flex  3# 3x10    sidelying ABD  3# 3x10 sidelying ER  3# 3x10    sidelying Flex  3# 3x10    sidelying ABD  3# 3x10 sidelying ER  3# 3x10    sidelying Flex  3# 3x10    sidelying ABD  3# 3x10 sidelying ER  4# 3x10    sidelying Flex  4# 3x10    sidelying ABD  4# 3x10 sidelying ER  4# 3x10    sidelying Flex  4# 3x10    sidelying ABD  4# 3x10   Ther Activity                        Gait Training                        Modalities CP 10' no adverse effects CP 10' no adverse effects Deferred to home today  deferred deferred

## 2022-08-25 ENCOUNTER — OFFICE VISIT (OUTPATIENT)
Dept: PHYSICAL THERAPY | Facility: CLINIC | Age: 42
End: 2022-08-25
Payer: OTHER MISCELLANEOUS

## 2022-08-25 DIAGNOSIS — M75.02 ADHESIVE CAPSULITIS OF LEFT SHOULDER: Primary | ICD-10-CM

## 2022-08-25 PROCEDURE — 97140 MANUAL THERAPY 1/> REGIONS: CPT

## 2022-08-25 PROCEDURE — 97110 THERAPEUTIC EXERCISES: CPT

## 2022-08-25 PROCEDURE — 97112 NEUROMUSCULAR REEDUCATION: CPT

## 2022-08-25 NOTE — PROGRESS NOTES
Daily Note     Today's date: 2022  Patient name: Marlena Cantrell  : 1980  MRN: [id number]  Referring provider: Kieran Hernandez  Dx:   Encounter Diagnosis     ICD-10-CM    1  Adhesive capsulitis of left shoulder  M75 02        Start Time: 830  Stop Time:   Total time in clinic (min): 55 minutes    Subjective: "My shoulder is feeling stiff and sore from reaching to close a heavy car door "      Objective: See treatment diary below      Assessment: Tolerated treatment well  Motion nearing full range with minimal end range soreness  Strength is slowly and steadily improving  Will continue to progress as able  Patient demonstrated fatigue post treatment and would benefit from continued PT      Plan: Continue per plan of care  Progress treatment as tolerated           Precautions: s/p L RTC repair, NO AROM X 6 weeks       Re-eval Date:     Date        Visit Count 56       FOTO        Pain In 0/10       Pain Out 0/10             Manuals        PROM L shoulder   GENTLE , inferior glides L shoulder to tolerance, posterior glides L shoulder to tolerance    Supine  10' total PROM L shoulder, LAD L GHJ   End range stretch all planes to tolerance                               Neuro Re-Ed        Ball on wall 4way red ball cueing for scap retraction  20x ea       Wall push up plus         Plantigrade plank alt shoulder taps   10x ea /5" hold                                        Ther Ex        Ube warm up  Alt 90rpm  70 rpm alt 10'        Posterior capsule stretch      Self L shoulder caudal glide        Pulleys flexion/scaption        Finger ladder flexion/scaption        Wall slides vs table slides flexion/abduction      Supine AAROM shoulder flexion PNF  grey  3x10/3-5"      Standing AROM  4# 3x10/3-5' ea       Sleeper stretch      Supine 90/90 self ER stretch  Prone scap/hori abd/ext  4# 3x10/3-5"ea        Prone row  4# 3x10/3-5"        sidelying ER  4# 3x10    sidelying Flex  4# 3x10    sidelying ABD  4# 3x10       Ther Activity                        Gait Training                        Modalities

## 2022-08-30 ENCOUNTER — OFFICE VISIT (OUTPATIENT)
Dept: PHYSICAL THERAPY | Facility: CLINIC | Age: 42
End: 2022-08-30
Payer: OTHER MISCELLANEOUS

## 2022-08-30 DIAGNOSIS — M75.02 ADHESIVE CAPSULITIS OF LEFT SHOULDER: Primary | ICD-10-CM

## 2022-08-30 PROCEDURE — 97110 THERAPEUTIC EXERCISES: CPT

## 2022-08-30 PROCEDURE — 97140 MANUAL THERAPY 1/> REGIONS: CPT

## 2022-08-30 PROCEDURE — 97112 NEUROMUSCULAR REEDUCATION: CPT

## 2022-08-30 NOTE — PROGRESS NOTES
Daily Note     Today's date: 2022  Patient name: Isaac Juarez  : 1980  MRN: [id number]  Referring provider: Filiberto Medellin  Dx:   Encounter Diagnosis     ICD-10-CM    1  Adhesive capsulitis of left shoulder  M75 02        Start Time: 0800  Stop Time: 0850  Total time in clinic (min): 50 minutes    Subjective: Pt offered no new concerns  Objective: See treatment diary below      Assessment: Tolerated treatment well  Pt strength and motion continues to improving  "Pop" noted in anterior shoulder during PROM, no pain or discomfort noted  Tightness noted in pec region during PROM  Will continue as able to address deficits  Patient demonstrated fatigue post treatment and would benefit from continued PT      Plan: Continue per plan of care  Progress treatment as tolerated           Precautions: s/p L RTC repair, NO AROM X 6 weeks       Re-eval Date:     Date       Visit Count 56 57      FOTO        Pain In 0/10 0/10      Pain Out 0/10 0/10            Manuals       PROM L shoulder   GENTLE , inferior glides L shoulder to tolerance, posterior glides L shoulder to tolerance    Supine  10' total PROM L shoulder, LAD L GHJ   End range stretch all planes to tolerance Supine  10' total PROM L shoulder, LAD L GHJ   End range stretch all planes to tolerance                              Neuro Re-Ed        Ball on wall 4way red ball cueing for scap retraction  20x ea 4way red ball cueing for scap retraction  20x ea      Wall push up plus         Plantigrade plank alt shoulder taps   10x ea /5" hold  Plantigrade plank alt shoulder taps   10x ea /5" hold                                       Ther Ex        Ube warm up  Alt 90rpm  70 rpm alt 10'  70 rpm alt 10'       Posterior capsule stretch      Self L shoulder caudal glide        Pulleys flexion/scaption        Finger ladder flexion/scaption        Wall slides vs table slides flexion/abduction      Supine AAROM shoulder flexion PNF  grey  3x10/3-5"      Standing AROM  4# 3x10/3-5' ea PNF  grey  3x10/3-5"      Standing AROM  4# 3x10/3-5' ea      Sleeper stretch      Supine 90/90 self ER stretch  Prone scap/hori abd/ext  4# 3x10/3-5"ea Prone scap/hori abd/ext  4# 3x10/3-5"ea       Prone row  4# 3x10/3-5" Prone row  4# 3x10/3-5"       sidelying ER  4# 3x10    sidelying Flex  4# 3x10    sidelying ABD  4# 3x10 sidelying ER  4# 3x10    sidelying Flex  4# 3x10    sidelying ABD  4# 3x10      Ther Activity                        Gait Training                        Modalities

## 2022-09-01 ENCOUNTER — OFFICE VISIT (OUTPATIENT)
Dept: PHYSICAL THERAPY | Facility: CLINIC | Age: 42
End: 2022-09-01
Payer: OTHER MISCELLANEOUS

## 2022-09-01 DIAGNOSIS — M75.02 ADHESIVE CAPSULITIS OF LEFT SHOULDER: Primary | ICD-10-CM

## 2022-09-01 PROCEDURE — 97112 NEUROMUSCULAR REEDUCATION: CPT

## 2022-09-01 PROCEDURE — 97110 THERAPEUTIC EXERCISES: CPT

## 2022-09-01 PROCEDURE — 97140 MANUAL THERAPY 1/> REGIONS: CPT

## 2022-09-01 NOTE — PROGRESS NOTES
Daily Note     Today's date: 2022  Patient name: José Miguel Claire  : 1980  MRN: [id number]  Referring provider: Elizabeth Ureña  Dx:   Encounter Diagnosis     ICD-10-CM    1  Adhesive capsulitis of left shoulder  M75 02        Start Time: 446  Stop Time: 845  Total time in clinic (min): 50 minutes    Subjective: "I am going back to the Dr next week  I guess this is the last unless they tell me different "      Objective: See treatment diary below      Assessment: Tolerated treatment well  Pt able to complete all exercises without incident  Strength and motion nearly normal values  Occasional end range tightness noted  OH strength improved since last RE  Will continue as able  Patient demonstrated fatigue post treatment and would benefit from continued PT      Plan: Continue per plan of care  Progress treatment as tolerated           Precautions: s/p L RTC repair, NO AROM X 6 weeks       Re-eval Date:     Date      Visit Count 89 09 00     FOTO        Pain In 0/10 0/10 0/10     Pain Out 0/10 0/10 0/10           Manuals      PROM L shoulder   GENTLE , inferior glides L shoulder to tolerance, posterior glides L shoulder to tolerance    Supine  10' total PROM L shoulder, LAD L GHJ   End range stretch all planes to tolerance Supine  10' total PROM L shoulder, LAD L GHJ   End range stretch all planes to tolerance Supine  10' total PROM L shoulder, LAD L GHJ   End range stretch all planes to tolerance                             Neuro Re-Ed        Ball on wall 4way red ball cueing for scap retraction  20x ea 4way red ball cueing for scap retraction  20x ea 4way red ball cueing for scap retraction  20x ea     Wall push up plus         Plantigrade plank alt shoulder taps   10x ea /5" hold  Plantigrade plank alt shoulder taps   10x ea /5" hold  Plantigrade plank alt shoulder taps   2x10 ea /5" hold                                      Ther Ex        Ube warm up  Alt 90rpm 70 rpm alt 10'  70 rpm alt 10'  70 rpm alt 10'      Posterior capsule stretch      Self L shoulder caudal glide        Pulleys flexion/scaption        Finger ladder flexion/scaption        Wall slides vs table slides flexion/abduction      Supine AAROM shoulder flexion PNF  grey  3x10/3-5"      Standing AROM  4# 3x10/3-5' ea PNF  grey  3x10/3-5"      Standing AROM  4# 3x10/3-5' ea PNF  grey  3x10/3-5"      Standing AROM  4# 3x10/3-5' ea     Sleeper stretch      Supine 90/90 self ER stretch  Prone scap/hori abd/ext  4# 3x10/3-5"ea Prone scap/hori abd/ext  4# 3x10/3-5"ea Prone scap/hori abd/ext  4# 3x10/3-5"ea      Prone row  4# 3x10/3-5" Prone row  4# 3x10/3-5" Prone row  4# 3x10/3-5"      sidelying ER  4# 3x10    sidelying Flex  4# 3x10    sidelying ABD  4# 3x10 sidelying ER  4# 3x10    sidelying Flex  4# 3x10    sidelying ABD  4# 3x10 sidelying ER  4# 3x10    sidelying Flex  4# 3x10    sidelying ABD  4# 3x10     Ther Activity                        Gait Training                        Modalities

## 2022-09-13 ENCOUNTER — OFFICE VISIT (OUTPATIENT)
Dept: OBGYN CLINIC | Facility: OTHER | Age: 42
End: 2022-09-13
Payer: OTHER MISCELLANEOUS

## 2022-09-13 VITALS
BODY MASS INDEX: 36.45 KG/M2 | HEIGHT: 78 IN | HEART RATE: 91 BPM | WEIGHT: 315 LBS | DIASTOLIC BLOOD PRESSURE: 98 MMHG | SYSTOLIC BLOOD PRESSURE: 144 MMHG

## 2022-09-13 DIAGNOSIS — M75.02 ADHESIVE CAPSULITIS OF LEFT SHOULDER: Primary | ICD-10-CM

## 2022-09-13 PROCEDURE — 99213 OFFICE O/P EST LOW 20 MIN: CPT | Performed by: PHYSICIAN ASSISTANT

## 2022-09-13 RX ORDER — AMOXICILLIN 875 MG/1
875 TABLET, COATED ORAL EVERY 12 HOURS
COMMUNITY
Start: 2022-08-03

## 2022-09-13 NOTE — PROGRESS NOTES
Assessment  Diagnoses and all orders for this visit:    Adhesive capsulitis of left shoulder    Discussion and Plan:    Gloria Ash states he is ready to return to work full duty  He will continue with his HEP as taught by therapist   He can resume all activities to tolerance  If he has any issues, questions or concerns, Gloria Ash will call the office  Follow up - as needed  Letter provided to return without restrictions - full duty    Subjective:   Patient ID: Marlena Cantrell is a 39 y o  male      Gloria Ash presents to the office in follow up of the left shoulder  He had a rotator cuff repair 16 months ago by Dr Berenice Garcia, but developed post-op adhesive capsulitis  He has been working diligently with physical therapy and with his HEP  He notes continued improvement in range of motion as well as strength  Denies pain with ADLs or with activities of enjoyment  Denies pain that interferes with sleep  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  Denies new injury or trauma  The following portions of the patient's history were reviewed and updated as appropriate: allergies, current medications, past family history, past medical history, past social history, past surgical history and problem list     Review of Systems   Constitutional: Negative for chills and fever  HENT: Negative for hearing loss  Eyes: Negative for visual disturbance  Respiratory: Negative for shortness of breath  Cardiovascular: Negative for chest pain  Gastrointestinal: Negative for abdominal pain  Musculoskeletal:        As reviewed in the HPI   Skin: Negative for rash  Neurological:        As reviewed in the HPI   Psychiatric/Behavioral: Negative for agitation         Objective:  /98 (BP Location: Right arm, Patient Position: Sitting, Cuff Size: Adult)   Pulse 91   Ht 6' 6" (1 981 m)   Wt (!) 144 kg (317 lb 11 2 oz)   BMI 36 71 kg/m²       Left Shoulder Exam     Tenderness   The patient is experiencing no tenderness  Range of Motion   Passive abduction: normal   External rotation: normal   Forward flexion: 160   Internal rotation 0 degrees: normal     Muscle Strength   External rotation: 5/5   Supraspinatus: 5/5     Tests   Hernandez test: negative  Impingement: negative    Other   Erythema: absent  Sensation: normal  Pulse: present             Physical Exam  Constitutional:       Appearance: He is well-developed  HENT:      Head: Normocephalic  Pulmonary:      Effort: No respiratory distress  Breath sounds: No wheezing  Musculoskeletal:      Cervical back: Normal range of motion  Skin:     General: Skin is warm and dry  Neurological:      Mental Status: He is alert and oriented to person, place, and time  Psychiatric:         Behavior: Behavior normal          Thought Content:  Thought content normal          Judgment: Judgment normal

## 2022-09-13 NOTE — LETTER
September 13, 2022     Patient: Sebas Bo  YOB: 1980  Date of Visit: 9/13/2022      To Whom it May Concern:    Orquidea Hinds is under my professional care  Dedra Foy was seen in my office on 9/13/2022  Dedra Foy may return to work on 9/14/2022 without restrictions  Full Duty  If you have any questions or concerns, please don't hesitate to call           Sincerely,          Tavia Geiger PA-C        CC: No Recipients

## 2022-10-18 NOTE — PROGRESS NOTES
Emili Rao will be discharged from outpatient physical therapy care due to expiration of plan of care  Last attended tx session was on 9/1 ;did not return to therapy after this date  No objective measures updated, Emili Rao is not present at time of discharge

## (undated) DEVICE — AMBIENT COVAC 50 IFS: Brand: COBLATION

## (undated) DEVICE — GAUZE SPONGES,16 PLY: Brand: CURITY

## (undated) DEVICE — SYRINGE 50ML LL

## (undated) DEVICE — AMBIENT MEGAVAC 90: Brand: COBLATION

## (undated) DEVICE — TUBING SUCTION 5MM X 12 FT

## (undated) DEVICE — NEEDLE SPINAL 18G X 3IN DISP

## (undated) DEVICE — SPECIMEN TISSUE TRAP 12MM RIGID

## (undated) DEVICE — 4-PORT MANIFOLD: Brand: NEPTUNE 2

## (undated) DEVICE — BURR  OVAL 4MM 13CM 8 FLUTE COOLCUT

## (undated) DEVICE — INTENDED FOR TISSUE SEPARATION, AND OTHER PROCEDURES THAT REQUIRE A SHARP SURGICAL BLADE TO PUNCTURE OR CUT.: Brand: BARD-PARKER ® CARBON RIB-BACK BLADES

## (undated) DEVICE — PACK PBDS SHOULDER ARTHROSCOPY RF

## (undated) DEVICE — MAT FLOOR STEP DRI 24 X 36 IN N-STRL

## (undated) DEVICE — FIBERTAPE 2MM X 7IN AR-7237-7

## (undated) DEVICE — NEEDLE 21 G X 1 1/2 SAFETY

## (undated) DEVICE — GLOVE SRG BIOGEL 8

## (undated) DEVICE — READY WET SKIN SCRUB TRAY-LF: Brand: MEDLINE INDUSTRIES, INC.

## (undated) DEVICE — GLOVE INDICATOR PI UNDERGLOVE SZ 7.5 BLUE

## (undated) DEVICE — BLADE SHAVER EXCALIBUR 4MM 13CM COOLCUT

## (undated) DEVICE — 3M™ STERI-DRAPE™ U-DRAPE 1015: Brand: STERI-DRAPE™

## (undated) DEVICE — SUT ETHILON 3-0 INFS-1 30 IN 669H

## (undated) DEVICE — GLOVE INDICATOR PI UNDERGLOVE SZ 8 BLUE

## (undated) DEVICE — POSITIONER HEAD DISP STRL

## (undated) DEVICE — 3M™ DURAPORE™ SURGICAL TAPE 1538-3, 3 INCH X 10 YARD (7,5CM X 9,1M), 4 ROLLS/BOX: Brand: 3M™ DURAPORE™

## (undated) DEVICE — STOCKINETTE,IMPERVIOUS,12X48,STERILE: Brand: MEDLINE

## (undated) DEVICE — CANNULA DISP 8.25 X 70MM W/SEAL SIDE PORT THRD

## (undated) DEVICE — SYRINGE 10ML LL

## (undated) DEVICE — PREP SURGICAL PURPREP 26ML

## (undated) DEVICE — ABDOMINAL PAD: Brand: DERMACEA

## (undated) DEVICE — DISPOSABLE CANNULA WITH OBTURATOR, SMOOTH, 6.0 MM X 75 MM: Brand: CONMED

## (undated) DEVICE — NEEDLE 18 G X 1 1/2 SAFETY

## (undated) DEVICE — GLOVE SRG BIOGEL 7.5

## (undated) DEVICE — NEEDLE SUT SCORPION MULTIFIRE

## (undated) DEVICE — TUBING ARTHROSCOPIC WAVE  MAIN PUMP